# Patient Record
Sex: MALE | Race: WHITE | Employment: OTHER | URBAN - METROPOLITAN AREA
[De-identification: names, ages, dates, MRNs, and addresses within clinical notes are randomized per-mention and may not be internally consistent; named-entity substitution may affect disease eponyms.]

---

## 2014-11-18 LAB — COLONOSCOPY, EXTERNAL: NORMAL

## 2021-12-25 ENCOUNTER — APPOINTMENT (OUTPATIENT)
Dept: CT IMAGING | Age: 71
End: 2021-12-25
Attending: EMERGENCY MEDICINE
Payer: MEDICARE

## 2021-12-25 ENCOUNTER — HOSPITAL ENCOUNTER (EMERGENCY)
Age: 71
Discharge: HOME | End: 2021-12-25
Attending: EMERGENCY MEDICINE
Payer: MEDICARE

## 2021-12-25 VITALS
SYSTOLIC BLOOD PRESSURE: 134 MMHG | BODY MASS INDEX: 25.77 KG/M2 | HEART RATE: 66 BPM | HEIGHT: 70 IN | RESPIRATION RATE: 16 BRPM | WEIGHT: 180 LBS | DIASTOLIC BLOOD PRESSURE: 84 MMHG | OXYGEN SATURATION: 100 % | TEMPERATURE: 97.7 F

## 2021-12-25 DIAGNOSIS — S01.81XA FACIAL LACERATION, INITIAL ENCOUNTER: ICD-10-CM

## 2021-12-25 DIAGNOSIS — G89.29 CHRONIC LOW BACK PAIN, UNSPECIFIED BACK PAIN LATERALITY, UNSPECIFIED WHETHER SCIATICA PRESENT: ICD-10-CM

## 2021-12-25 DIAGNOSIS — S09.90XA CLOSED HEAD INJURY, INITIAL ENCOUNTER: Primary | ICD-10-CM

## 2021-12-25 DIAGNOSIS — M54.50 CHRONIC LOW BACK PAIN, UNSPECIFIED BACK PAIN LATERALITY, UNSPECIFIED WHETHER SCIATICA PRESENT: ICD-10-CM

## 2021-12-25 PROCEDURE — 90715 TDAP VACCINE 7 YRS/> IM: CPT | Performed by: EMERGENCY MEDICINE

## 2021-12-25 PROCEDURE — 75810000293 HC SIMP/SUPERF WND  RPR

## 2021-12-25 PROCEDURE — 74011250636 HC RX REV CODE- 250/636: Performed by: EMERGENCY MEDICINE

## 2021-12-25 PROCEDURE — 99282 EMERGENCY DEPT VISIT SF MDM: CPT

## 2021-12-25 PROCEDURE — 90471 IMMUNIZATION ADMIN: CPT

## 2021-12-25 PROCEDURE — 70450 CT HEAD/BRAIN W/O DYE: CPT

## 2021-12-25 RX ORDER — CYCLOBENZAPRINE HCL 10 MG
10 TABLET ORAL
Qty: 12 TABLET | Refills: 0 | Status: SHIPPED | OUTPATIENT
Start: 2021-12-25 | End: 2022-01-09

## 2021-12-25 RX ORDER — LIDOCAINE HYDROCHLORIDE AND EPINEPHRINE 20; 5 MG/ML; UG/ML
INJECTION, SOLUTION EPIDURAL; INFILTRATION; INTRACAUDAL; PERINEURAL
Status: DISCONTINUED
Start: 2021-12-25 | End: 2021-12-26 | Stop reason: HOSPADM

## 2021-12-25 RX ORDER — LIDOCAINE HYDROCHLORIDE AND EPINEPHRINE 10; 10 MG/ML; UG/ML
1.5 INJECTION, SOLUTION INFILTRATION; PERINEURAL ONCE
Status: DISCONTINUED | OUTPATIENT
Start: 2021-12-25 | End: 2021-12-26 | Stop reason: HOSPADM

## 2021-12-25 RX ADMIN — TETANUS TOXOID, REDUCED DIPHTHERIA TOXOID AND ACELLULAR PERTUSSIS VACCINE, ADSORBED 0.5 ML: 5; 2.5; 8; 8; 2.5 SUSPENSION INTRAMUSCULAR at 21:10

## 2021-12-26 NOTE — ED PROVIDER NOTES
EMERGENCY DEPARTMENT HISTORY AND PHYSICAL EXAM      Date: 12/25/2021  Patient Name: Reece Lopez    History of Presenting Illness     Chief Complaint   Patient presents with    Laceration     Patient has parkinsons and fell forward this evening and hit his head on counter top, lac to R eye       History Provided By: Patient    HPI: Reece Lopez, 73 y.o. male with PMHx as noted below presents the emergency department for evaluation of head injury. Patient states that just prior to arrival he tripped falling and hitting the right side of his head on a counter. Denies any significant headache or loss of consciousness. Patient did sustain a laceration to his face with active bleeding. Denies any visual changes or neck pain. Symptoms are mild and constant. Pt denies any other alleviating or exacerbating factors. Additionally, pt specifically denies any recent fever, chills, headache, nausea, vomiting, abdominal pain, CP, SOB, lightheadedness, dizziness, numbness, weakness, BLE swelling, heart palpitations, urinary sxs, diarrhea, constipation, melena, hematochezia, cough, or congestion. PCP: Unknown, Provider, MD    Current Facility-Administered Medications   Medication Dose Route Frequency Provider Last Rate Last Admin    lidocaine-EPINEPHrine (PF) (XYLOCAINE) 2 %-1:200,000 injection             lidocaine-EPINEPHrine (XYLOCAINE) 1 %-1:100,000 injection 15 mg  1.5 mL SubCUTAneous Gunjan Atkinson MD         Current Outpatient Medications   Medication Sig Dispense Refill    cyclobenzaprine (FLEXERIL) 10 mg tablet Take 1 Tablet by mouth three (3) times daily as needed for Muscle Spasm(s) for up to 15 days. 12 Tablet 0       Past History     Past Medical History:  History of Parkinson's disease    Past Surgical History:  History reviewed, no pertinent past surgical history  Family History:  No family history on file.     Social History:  Denies heavy alcohol or illicit drug use  Allergies:  No Known Allergies      Review of Systems   Review of Systems  Constitutional: Negative for fever, chills, and fatigue. HENT: Negative for congestion, sore throat, rhinorrhea, sneezing and neck stiffness   Eyes: Negative for discharge and redness. Respiratory: Negative for  shortness of breath, wheezing   Cardiovascular: Negative for chest pain, palpitations   Gastrointestinal: Negative for nausea, vomiting, abdominal pain, constipation, diarrhea and blood in stool. Genitourinary: Negative for dysuria, hematuria, flank pain, decreased urine volume, discharge,   Musculoskeletal: Negative for myalgias or joint pain . Skin: Negative for rash or lesions . Neurological: Negative weakness, light-headedness, numbness and headaches. Physical Exam   Physical Exam    GENERAL: alert and oriented, no acute distress  EYES: PEERL, No injection, discharge or icterus. HENT: Mucous membranes pink and moist.  There is a 1.5 cm laceration lateral to the right eye, no canthal involvement, bleeding controlled with direct pressure. Extraocular movements intact  NECK: Supple  LUNGS: Airway patent. Non-labored respirations. Breath sounds clear with good air entry bilaterally. HEART: Regular rate and rhythm. No peripheral edema  ABDOMEN: Non-distended and non-tender, without guarding or rebound. SKIN:  warm, dry  MSK/EXTREMITIES: Without swelling, tenderness or deformity, symmetric with normal ROM, no midline spinal tenderness  NEUROLOGICAL: Alert, oriented      Diagnostic Study Results     Labs -   No results found for this or any previous visit (from the past 12 hour(s)). Radiologic Studies -   CT HEAD WO CONT   Final Result   No acute intracranial process. Thalamic stimulator in place. There is a mild to moderate degree of cerebral atrophy. CT Results  (Last 48 hours)               12/25/21 2010  CT HEAD WO CONT Final result    Impression:  No acute intracranial process. Thalamic stimulator in place. There is a mild to moderate degree of cerebral atrophy. Narrative:  CLINICAL HISTORY: fall   INDICATION: fall   COMPARISON: None. CT dose reduction was achieved through use of a standardized protocol tailored   for this examination and automatic exposure control for dose modulation. TECHNIQUE: Serial axial images with a collimation of 5 mm were obtained from the   skull base through the vertex     FINDINGS:    There is sulcal and ventricular prominence. Thalamic stimulators are present. There is no evidence of an acute infarction, hemorrhage, or mass-effect. There   is no evidence of midline shift or hydrocephalus. Posterior fossa structures are   unremarkable. No extra-axial collections are seen. Mastoid air cells are well pneumatized and clear. There is no evidence of depressed skull fractures of soft tissue swelling. CXR Results  (Last 48 hours)    None            Medical Decision Making     I, Rod Jackson MD am the first provider for this patient and am the attending of record for this patient encounter. I reviewed the vital signs, available nursing notes, past medical history, past surgical history, family history and social history. Vital Signs-Reviewed the patient's vital signs. Patient Vitals for the past 12 hrs:   Temp Pulse Resp BP SpO2   12/25/21 1903 97.7 °F (36.5 °C) 66 16 134/84 100 %       Records Reviewed: Nursing Notes and Old Medical Records    Provider Notes (Medical Decision Making): On presentation, the patient is well appearing, in no acute distress with normal vital signs. Based on my history and exam the differential diagnosis for this patient includes closed head injury, laceration, lumbar strain. CT scan of the head obtained on my interpretation showed no acute findings. Laceration was lateral to the lateral canthus, no tendon or muscle involvement, facial muscles were intact.   Did  on close head injury and possible post concussive symptoms may develop. Patient does attribute fall to recently straining his lower back. He has no red flag symptoms that would warrant emergent imaging at this time. Will trial course of muscle relaxers and if referred him to orthopedics. Laceration was repaired, patient was given a tetanus immunization prior to discharge. ED Course:   Initial assessment performed. The patients presenting problems have been discussed, and they are in agreement with the care plan formulated and outlined with them. I have encouraged them to ask questions as they arise throughout their visit. Medications   lidocaine-EPINEPHrine (PF) (XYLOCAINE) 2 %-1:200,000 injection (has no administration in time range)   lidocaine-EPINEPHrine (XYLOCAINE) 1 %-1:100,000 injection 15 mg (has no administration in time range)   diph,Pertuss(AC),Tet Vac-PF (BOOSTRIX) suspension 0.5 mL (0.5 mL IntraMUSCular Given 12/25/21 2110)       Procedure Note - Laceration Repair:  Procedure by Humza Palma MD  Complexity: simple   1.5cm linear laceration to face  was irrigated copiously with NS under jet lavage, prepped with Hibiclens and draped in a sterile fashion. The area was anesthetized via local infiltration of 2 mL lidocaine 1% with epinephrine. The wound was explored with the following results: No foreign bodies found. The wound was repaired with One layer suture closure: Skin Layer:  4 sutures placed, stitch type:simple interrupted, suture: 5-0 ethilon. .  The wound was closed with good hemostasis and approximation. Sterile dressing applied. Estimated blood loss: minimal  The procedure took 1-15 minutes, and pt tolerated well. PROGRESS  Keron Henry  results have been reviewed with him. He has been counseled regarding his diagnosis. He verbally conveys understanding and agreement of the signs, symptoms, diagnosis, treatment and prognosis and additionally agrees to follow up as recommended.   He also agrees with the care-plan and conveys that all of his questions have been answered. I have also put together some discharge instructions for him that include: 1) educational information regarding their diagnosis, 2) how to care for their diagnosis at home, as well a 3) list of reasons why they would want to return to the ED prior to their follow-up appointment, should their condition change. Disposition:  home    PLAN:  1. Discharge Medication List as of 12/25/2021  9:16 PM        2. Follow-up Information     Follow up With Specialties Details Why Contact Info    [facility] EMERGENCY DEPT Emergency Medicine  If symptoms worsen [address]  [address] [email or web address]  [phone]    [facility]  Schedule an appointment as soon as possible for a visit in 2 days  [address]  [address] [facility],[address]  [address]  [email or web address]  [phone]        Return to ED if worse     Diagnosis     Clinical Impression:   1. Closed head injury, initial encounter    2. Facial laceration, initial encounter    3. Chronic low back pain, unspecified back pain laterality, unspecified whether sciatica present        Please note that this dictation was completed with Dragon, computer voice recognition software. Quite often unanticipated grammatical, syntax, homophones, and other interpretive errors are inadvertently transcribed by the computer software. Please disregard these errors. Additionally, please excuse any errors that have escaped final proofreading.

## 2022-01-12 ENCOUNTER — TRANSCRIBE ORDER (OUTPATIENT)
Dept: SCHEDULING | Age: 72
End: 2022-01-12

## 2022-01-12 DIAGNOSIS — M54.50 LUMBAR PAIN: ICD-10-CM

## 2022-01-12 DIAGNOSIS — M47.816 LUMBAR FACET ARTHROPATHY: Primary | ICD-10-CM

## 2022-01-12 DIAGNOSIS — M51.37 DDD (DEGENERATIVE DISC DISEASE), LUMBOSACRAL: ICD-10-CM

## 2022-01-23 ENCOUNTER — HOSPITAL ENCOUNTER (OUTPATIENT)
Dept: CT IMAGING | Age: 72
Discharge: HOME | End: 2022-01-23
Attending: FAMILY MEDICINE
Payer: MEDICARE

## 2022-01-23 DIAGNOSIS — M54.50 LUMBAR PAIN: ICD-10-CM

## 2022-01-23 DIAGNOSIS — M47.816 LUMBAR FACET ARTHROPATHY: ICD-10-CM

## 2022-01-23 DIAGNOSIS — M51.37 DDD (DEGENERATIVE DISC DISEASE), LUMBOSACRAL: ICD-10-CM

## 2022-01-23 PROCEDURE — 72131 CT LUMBAR SPINE W/O DYE: CPT

## 2022-01-24 ENCOUNTER — TELEPHONE (OUTPATIENT)
Dept: FAMILY MEDICINE CLINIC | Age: 72
End: 2022-01-24

## 2022-01-24 ENCOUNTER — NURSE TRIAGE (OUTPATIENT)
Dept: OTHER | Facility: CLINIC | Age: 72
End: 2022-01-24

## 2022-01-24 NOTE — TELEPHONE ENCOUNTER
Received call from Hanna Barber at [facility] with Red Flag Complaint. Subjective: Caller states \"He had a fall on francisco javier day. Today, when he was getting ready to go to the neurologist  I noticed he had swelling in his L arm more than his right arm. The neurologist stated that he thought it was cellulitis, but was unable to prescribe abx and for him to follow up with his PCP. \"     Current Symptoms: L arm swelling, redness, and warm to touch     Onset: several hours ago; rapid    Associated Symptoms: NA    Pain Severity: Denies  Temperature:Denies  What has been tried: tylenol     LMP: NA Pregnant: NA    Recommended disposition: See in office within 24 hours. Care advice provided, patient verbalizes understanding; denies any other questions or concerns; instructed to call back for any new or worsening symptoms. Writer provided warm transfer to Geannie Ahumada at [facility] for appointment scheduling    Attention Provider: Thank you for allowing me to participate in the care of your patient. The patient was connected to triage in response to information provided to the [facility]. Please do not respond through this encounter as the response is not directed to a shared pool.       Reason for Disposition   MODERATE arm swelling (e.g., puffiness or swollen feeling of entire arm)    Protocols used: ARM SWELLING AND EDEMA-ADULT-

## 2022-01-24 NOTE — TELEPHONE ENCOUNTER
[facility] trying to get patient in via nurse triage. I told agent he is a new patient pending an appt in February, so we do not have anything earlier for pt until then.  Please call wife for triage at [phone]

## 2022-01-25 ENCOUNTER — HOSPITAL ENCOUNTER (EMERGENCY)
Age: 72
Discharge: HOME | End: 2022-01-25
Attending: EMERGENCY MEDICINE
Payer: MEDICARE

## 2022-01-25 VITALS
HEART RATE: 61 BPM | SYSTOLIC BLOOD PRESSURE: 96 MMHG | TEMPERATURE: 97.4 F | WEIGHT: 180 LBS | RESPIRATION RATE: 22 BRPM | BODY MASS INDEX: 25.83 KG/M2 | DIASTOLIC BLOOD PRESSURE: 74 MMHG | OXYGEN SATURATION: 100 %

## 2022-01-25 DIAGNOSIS — L03.114 CELLULITIS OF LEFT UPPER EXTREMITY: Primary | ICD-10-CM

## 2022-01-25 LAB
ALBUMIN SERPL-MCNC: 3.4 G/DL (ref 3.5–5)
ALBUMIN/GLOB SERPL: 0.9 {RATIO} (ref 1.1–2.2)
ALP SERPL-CCNC: 48 U/L (ref 45–117)
ALT SERPL-CCNC: 10 U/L (ref 12–78)
ANION GAP SERPL CALC-SCNC: 6 MMOL/L (ref 5–15)
AST SERPL-CCNC: 65 U/L (ref 15–37)
BASOPHILS # BLD: 0 K/UL (ref 0–0.1)
BASOPHILS NFR BLD: 0 % (ref 0–1)
BILIRUB SERPL-MCNC: 1 MG/DL (ref 0.2–1)
BUN SERPL-MCNC: 16 MG/DL (ref 6–20)
BUN/CREAT SERPL: 17 (ref 12–20)
CALCIUM SERPL-MCNC: 8.9 MG/DL (ref 8.5–10.1)
CHLORIDE SERPL-SCNC: 92 MMOL/L (ref 97–108)
CO2 SERPL-SCNC: 26 MMOL/L (ref 21–32)
CREAT SERPL-MCNC: 0.93 MG/DL (ref 0.7–1.3)
DIFFERENTIAL METHOD BLD: ABNORMAL
EOSINOPHIL # BLD: 0.1 K/UL (ref 0–0.4)
EOSINOPHIL NFR BLD: 1 % (ref 0–7)
ERYTHROCYTE [DISTWIDTH] IN BLOOD BY AUTOMATED COUNT: 13.6 % (ref 11.5–14.5)
GLOBULIN SER CALC-MCNC: 3.8 G/DL (ref 2–4)
GLUCOSE SERPL-MCNC: 87 MG/DL (ref 65–100)
HCT VFR BLD AUTO: 27.5 % (ref 36.6–50.3)
HGB BLD-MCNC: 9.6 G/DL (ref 12.1–17)
IMM GRANULOCYTES # BLD AUTO: 0 K/UL (ref 0–0.04)
IMM GRANULOCYTES NFR BLD AUTO: 0 % (ref 0–0.5)
LYMPHOCYTES # BLD: 0.5 K/UL (ref 0.8–3.5)
LYMPHOCYTES NFR BLD: 5 % (ref 12–49)
MCH RBC QN AUTO: 33.7 PG (ref 26–34)
MCHC RBC AUTO-ENTMCNC: 34.9 G/DL (ref 30–36.5)
MCV RBC AUTO: 96.5 FL (ref 80–99)
MONOCYTES # BLD: 0.6 K/UL (ref 0–1)
MONOCYTES NFR BLD: 6 % (ref 5–13)
NEUTS SEG # BLD: 9.5 K/UL (ref 1.8–8)
NEUTS SEG NFR BLD: 88 % (ref 32–75)
NRBC # BLD: 0 K/UL (ref 0–0.01)
NRBC BLD-RTO: 0 PER 100 WBC
PLATELET # BLD AUTO: 314 K/UL (ref 150–400)
PMV BLD AUTO: 9.3 FL (ref 8.9–12.9)
POTASSIUM SERPL-SCNC: 3.5 MMOL/L (ref 3.5–5.1)
PROT SERPL-MCNC: 7.2 G/DL (ref 6.4–8.2)
RBC # BLD AUTO: 2.85 M/UL (ref 4.1–5.7)
RBC MORPH BLD: ABNORMAL
SODIUM SERPL-SCNC: 124 MMOL/L (ref 136–145)
WBC # BLD AUTO: 10.7 K/UL (ref 4.1–11.1)

## 2022-01-25 PROCEDURE — 85025 COMPLETE CBC W/AUTO DIFF WBC: CPT

## 2022-01-25 PROCEDURE — 99282 EMERGENCY DEPT VISIT SF MDM: CPT

## 2022-01-25 PROCEDURE — 80053 COMPREHEN METABOLIC PANEL: CPT

## 2022-01-25 PROCEDURE — 36415 COLL VENOUS BLD VENIPUNCTURE: CPT

## 2022-01-25 RX ORDER — DOXYCYCLINE HYCLATE 100 MG
100 TABLET ORAL 2 TIMES DAILY
Qty: 20 TABLET | Refills: 0 | Status: SHIPPED | OUTPATIENT
Start: 2022-01-25 | End: 2022-02-04

## 2022-01-25 NOTE — DISCHARGE INSTRUCTIONS
It was a pleasure taking care of you in our Emergency Department today. We know that when you come to [facility], you are entrusting us with your health, comfort, and safety. Our physicians and nurses honor that trust, and truly appreciate the opportunity to care for you and your loved ones. We also value your feedback. If you receive a survey about your Emergency Department experience today, please fill it out. We care about our patients' feedback, and we listen to what you have to say. Please read over your discharge instructions as these contain pertinent information to help you in the healing process. These instructions include a list of prescriptions you were given today. Follow-up information is also noted on your discharge papers. There are attached instructions and information pertaining to the reason why you were seen in the emergency department today. These discharge instructions may not be for exactly why you were here, but may be the closest available instructions that we have. These include important advice for things that you can do at home to feel better, and reasons to return to the emergency department. The evaluation and treatment you received in the emergency department is not always definitive care. If follow-up with your primary care doctor or specialist was recommended, it is important that you make these appointments for follow-up care. You may need further testing, procedures, and/or medications to help you feel better. Further tests may be required that are not available in the emergency department. Failure to make these follow-up appointments may jeopardize your health. The emergency department is here for emergent stabilization and evaluation of life and limb threatening illness and/or injuries.   Further care through a specialist or primary care doctor may be required to assist in your healing and complete your treatment and/or evaluation. We may not always be able to make a diagnosis in the emergency department, or things may change that will alter your diagnosis. Our primary goal is to ensure that nothing serious is occurring and that you are stable to continue your treatment and evaluation at home as an outpatient. Of course, if things change, and you feel worse, you are always encouraged to return to the emergency department for re-evaluation. Lab Results Today:  Recent Results (from the past 8 hour(s))   CBC WITH AUTOMATED DIFF    Collection Time: 01/25/22 10:06 AM   Result Value Ref Range    WBC 10.7 4.1 - 11.1 K/uL    RBC 2.85 (L) 4.10 - 5.70 M/uL    HGB 9.6 (L) 12.1 - 17.0 g/dL    HCT 27.5 (L) 36.6 - 50.3 %    MCV 96.5 80.0 - 99.0 FL    MCH 33.7 26.0 - 34.0 PG    MCHC 34.9 30.0 - 36.5 g/dL    RDW 13.6 11.5 - 14.5 %    PLATELET 072 325 - 134 K/uL    MPV 9.3 8.9 - 12.9 FL    NRBC 0.0 0  WBC    ABSOLUTE NRBC 0.00 0.00 - 0.01 K/uL    NEUTROPHILS 88 (H) 32 - 75 %    LYMPHOCYTES 5 (L) 12 - 49 %    MONOCYTES 6 5 - 13 %    EOSINOPHILS 1 0 - 7 %    BASOPHILS 0 0 - 1 %    IMMATURE GRANULOCYTES 0 0.0 - 0.5 %    ABS. NEUTROPHILS 9.5 (H) 1.8 - 8.0 K/UL    ABS. LYMPHOCYTES 0.5 (L) 0.8 - 3.5 K/UL    ABS. MONOCYTES 0.6 0.0 - 1.0 K/UL    ABS. EOSINOPHILS 0.1 0.0 - 0.4 K/UL    ABS. BASOPHILS 0.0 0.0 - 0.1 K/UL    ABS. IMM. GRANS. 0.0 0.00 - 0.04 K/UL    DF SMEAR SCANNED      RBC COMMENTS NORMOCYTIC, NORMOCHROMIC          Radiology Results Today:  No results found.

## 2022-01-25 NOTE — ED PROVIDER NOTES
EMERGENCY DEPARTMENT HISTORY AND PHYSICAL EXAM      Date: 1/25/2022  Patient Name: Pamela Nichols    History of Presenting Illness     Chief Complaint   Patient presents with    Swelling     Left elbow swelling and redness for at least 2 days. Hx of Parkinsons with nerve stimulator, chronic back pain on tylenol/motrin       History Provided By: Patient    HPI: Pamela Nichols, 73 y.o. male  presents to the ED with cc of left elbow swelling and redness. Patient states symptoms of been present for least 2 days. He normally wears long sleeves and may have been present for longer just not noticed by his family member. He denies any fevers or chills. He has a history of Parkinson's. He does have a scab on the left elbow. Family reports that he leans on this arm a lot especially when he is on the sofa. He has been taking Tylenol and Motrin for discomfort. No procedures on the left upper extremity recently. No IVs.  No history of DVT. Past History     Past Medical History:  No past medical history on file. Past Surgical History:  No past surgical history on file. Medications:  No current facility-administered medications on file prior to encounter. No current outpatient medications on file prior to encounter. Family History:  No family history on file. Social History:  Social History     Tobacco Use    Smoking status: Not on file    Smokeless tobacco: Not on file   Substance Use Topics    Alcohol use: Not on file    Drug use: Not on file       Allergies:  No Known Allergies    All the above components of the past  history are auto-populated from the electronic record. They have been reviewed and the patient has been interviewed for any pertinent past history that pertains to the patient's chief complaint and reason for visit. Not all pre-populated components may be accurate at the time this note was generated.     Review of Systems   Review of Systems   Constitutional: Negative for chills and fever. HENT: Negative for congestion, ear pain, rhinorrhea, sore throat and trouble swallowing. Eyes: Negative for visual disturbance. Respiratory: Negative for cough, chest tightness and shortness of breath. Cardiovascular: Negative for chest pain and palpitations. Gastrointestinal: Negative for abdominal pain, blood in stool, constipation, diarrhea, nausea and vomiting. Genitourinary: Negative for decreased urine volume, difficulty urinating, dysuria and frequency. Musculoskeletal: Positive for arthralgias. Negative for back pain and neck pain. Skin: Positive for wound. Negative for color change and rash. Neurological: Negative for dizziness, weakness, light-headedness and headaches. Physical Exam   Physical Exam  Vitals and nursing note reviewed. Constitutional:       General: He is not in acute distress. Appearance: He is well-developed. He is not ill-appearing. HENT:      Head: Normocephalic. Eyes:      Conjunctiva/sclera: Conjunctivae normal.   Cardiovascular:      Rate and Rhythm: Normal rate and regular rhythm. Pulmonary:      Effort: Pulmonary effort is normal. No accessory muscle usage or respiratory distress. Abdominal:      General: There is no distension. Musculoskeletal:      Left elbow: Swelling present. No deformity or effusion. Normal range of motion. Tenderness present. Cervical back: Normal range of motion. Comments: Left upper extremity does note erythema, warmth, and tenderness as well as swelling around the left elbow. There is no joint effusion. He has full nonpainful range of motion in the elbow. I do not suspect septic joint or bursitis. The erythema and swelling extends beyond the bursa. He does have a scab to the olecranon process. Skin:     General: Skin is warm and dry. Neurological:      Mental Status: He is alert and oriented to person, place, and time.          Due to the COVID-19 pandemic, in order to reduce the spread and transmission of the virus, some basic elements of the physical exam have been deferred to reduce direct or close contact with the patient unless they are deemed to be absolutely necessary, regardless of whether the virus is highly suspected or not. Diagnostic Study Results     Labs -     Recent Results (from the past 24 hour(s))   CBC WITH AUTOMATED DIFF    Collection Time: 01/25/22 10:06 AM   Result Value Ref Range    WBC 10.7 4.1 - 11.1 K/uL    RBC 2.85 (L) 4.10 - 5.70 M/uL    HGB 9.6 (L) 12.1 - 17.0 g/dL    HCT 27.5 (L) 36.6 - 50.3 %    MCV 96.5 80.0 - 99.0 FL    MCH 33.7 26.0 - 34.0 PG    MCHC 34.9 30.0 - 36.5 g/dL    RDW 13.6 11.5 - 14.5 %    PLATELET 301 661 - 432 K/uL    MPV 9.3 8.9 - 12.9 FL    NRBC 0.0 0  WBC    ABSOLUTE NRBC 0.00 0.00 - 0.01 K/uL    NEUTROPHILS 88 (H) 32 - 75 %    LYMPHOCYTES 5 (L) 12 - 49 %    MONOCYTES 6 5 - 13 %    EOSINOPHILS 1 0 - 7 %    BASOPHILS 0 0 - 1 %    IMMATURE GRANULOCYTES 0 0.0 - 0.5 %    ABS. NEUTROPHILS 9.5 (H) 1.8 - 8.0 K/UL    ABS. LYMPHOCYTES 0.5 (L) 0.8 - 3.5 K/UL    ABS. MONOCYTES 0.6 0.0 - 1.0 K/UL    ABS. EOSINOPHILS 0.1 0.0 - 0.4 K/UL    ABS. BASOPHILS 0.0 0.0 - 0.1 K/UL    ABS. IMM. GRANS. 0.0 0.00 - 0.04 K/UL    DF SMEAR SCANNED      RBC COMMENTS NORMOCYTIC, NORMOCHROMIC         Radiologic Studies -   No orders to display     CT Results  (Last 48 hours)    None        CXR Results  (Last 48 hours)    None            Medical Decision Making     I reviewed the vital signs, available nursing notes, past medical history, past surgical history, family history and social history. Vital Signs-I have reviewed the vital signs that have been made available during the patient's emergency department visit. The vital signs auto-populated below are obtained mostly by electronic means through monitoring devices that have been downloaded into the patient's chart by the nursing staff.   Some vital signs are not downloaded into the chart until after the patient has been discharged and this note has been completed, therefore some vital signs may not be available to the physician for review prior to patient's discharge or admission. The physician has reviewed the patient's triage vital signs, monitored the electronic monitoring devices remotely for any significant vital sign abnormalities, and have reviewed vital signs prior to discharge. Some vital signs reviewed at bedside or remotely utilizing electronic monitoring devices may be different than the vital signs downloaded into the electronic medical record. Some vital signs may be erroneous and inaccurate since they are obtained by electronic monitoring devices, and not all vital signs are verified for accuracy by nursing staff prior to downloading into the patient's chart. Patient Vitals for the past 24 hrs:   Temp Pulse Resp BP SpO2   01/25/22 0957 97.4 °F (36.3 °C) 61 22 96/74 100 %         Records Reviewed: Nursing notes for today's visit have been reviewed. I have also reviewed most recent medical records pertinent to today's complaints, if available in our medical record system. I have also reviewed all labs and imaging results from previous results in comparison to results obtained today. If an EKG was obtained today, it has been compared to previous EKGs, if available. If arriving via EMS, the EMS report has been reviewed if made available to us within the patient's time in the emergency department. Provider Notes (Medical Decision Making):   Patient presents with signs of cellulitis to the left elbow area. I do not suspect bursitis and that the symptoms extend beyond the bursa. He does have a scab to the olecranon process which was likely the site of inoculation. No signs of sepsis. Do not suspect septic joint. I do not suspect DVT as an upper extremity DVT is highly rare without any recent procedures or IVs.  We will start him on doxycycline.   Recommend follow-up with PCP and he is an established patient with [address] [place] [address] who can also provide follow-up. ED Course:   Initial assessment performed. The patients presenting problems have been discussed, and they are in agreement with the care plan formulated and outlined with them. I have encouraged them to ask questions as they arise throughout their visit. Orders Placed This Encounter    CBC WITH AUTOMATED DIFF    METABOLIC PANEL, COMPREHENSIVE    INSERT PERIPHERAL IV ONE TIME STAT    doxycycline (VIBRA-TABS) 100 mg tablet       Procedures      Critical Care Time:   0    Disposition:  Discharge    The patient's emergency department evaluation is now complete. I have reviewed all labs, imaging, and pertinent information. I have discussed all results with the patient and/or family. Based on our evaluation today I do believe that the patient is safe to be discharged home. The patient has been provided with at home instructions that are pertinent to their complaint today, although these may not be specific to the exact diagnosis. I have reviewed the patient's home medications and attempted to reconcile if not already done so by pharmacy or nursing staff. I have discussed all new prescriptions with the patient. The patient has been encouraged to follow-up with primary care doctor and/or specialist, and these have been discussed with the patient. The patient has been advised that they may return to the emergency department if they have any worsening symptoms and or new symptoms that are of concern to them. Verbal discharge instructions may have also been provided to the patient that may not be specifically contained in the written discharge instructions. The patient has been given opportunity to ask questions prior to discharge. PLAN:  1. Current Discharge Medication List      START taking these medications    Details   doxycycline (VIBRA-TABS) 100 mg tablet Take 1 Tablet by mouth two (2) times a day for 10 days.   Qty: 20 Tablet, Refills: 0  Start date: 1/25/2022, End date: 2/4/2022           2. Follow-up Information     Follow up With Specialties Details Why Contact Info    Cindy Marin MD Family Medicine Schedule an appointment as soon as possible for a visit in 1 week  [address]      Nu King MD Sports Medicine Schedule an appointment as soon as possible for a visit in 1 week  [address] [facility] [address]  [address] [id number] [phone]          Return to ED if worse     Diagnosis     Clinical Impression:   1.  Cellulitis of left upper extremity

## 2022-01-31 RX ORDER — RAMELTEON 8 MG/1
1 TABLET ORAL AT BEDTIME
COMMUNITY
Start: 2021-08-18

## 2022-01-31 RX ORDER — CHOLECALCIFEROL TAB 125 MCG (5000 UNIT) 125 MCG
5000 TAB ORAL DAILY
COMMUNITY

## 2022-01-31 RX ORDER — MULTIVIT WITH MINERALS/HERBS
1 TABLET ORAL DAILY
COMMUNITY

## 2022-01-31 RX ORDER — IBUPROFEN 200 MG
600 TABLET ORAL
COMMUNITY
Start: 2021-09-28 | End: 2022-04-20

## 2022-01-31 RX ORDER — CARBIDOPA AND LEVODOPA 50; 200 MG/1; MG/1
2 TABLET, EXTENDED RELEASE ORAL AT BEDTIME
COMMUNITY
Start: 2021-07-09

## 2022-01-31 RX ORDER — LEVODOPA 42 MG/1
84 CAPSULE RESPIRATORY (INHALATION) AS NEEDED
COMMUNITY
Start: 2021-09-09

## 2022-01-31 RX ORDER — LEVOTHYROXINE SODIUM 75 UG/1
75 TABLET ORAL
COMMUNITY
End: 2022-09-13

## 2022-01-31 RX ORDER — MULTIVIT WITH IRON,MINERALS
3 TABLET,CHEWABLE ORAL DAILY
COMMUNITY
Start: 2021-09-09

## 2022-01-31 RX ORDER — MELATONIN/PYRIDOXINE HCL (B6) 10 MG-10MG
1 TABLET,IMMED, EXTENDED RELEASE, BIPHASIC ORAL
COMMUNITY
Start: 2021-09-17

## 2022-01-31 RX ORDER — CARBIDOPA, LEVODOPA AND ENTACAPONE 31.25; 200; 125 MG/1; MG/1; MG/1
TABLET, FILM COATED ORAL
COMMUNITY
Start: 2021-03-22

## 2022-01-31 RX ORDER — ACETAMINOPHEN 500 MG
1000 TABLET ORAL 3 TIMES DAILY
COMMUNITY

## 2022-01-31 RX ORDER — POLYETHYLENE GLYCOL 3350 17 G/17G
17 POWDER, FOR SOLUTION ORAL DAILY PRN
COMMUNITY
Start: 2021-09-09

## 2022-01-31 RX ORDER — CARBIDOPA AND LEVODOPA 25; 100 MG/1; MG/1
3 TABLET ORAL DAILY
COMMUNITY
Start: 2022-01-24

## 2022-01-31 RX ORDER — PAROXETINE 30 MG/1
1 TABLET, FILM COATED ORAL AT BEDTIME
COMMUNITY
Start: 2021-09-17

## 2022-01-31 RX ORDER — MIRTAZAPINE 45 MG/1
1 TABLET, FILM COATED ORAL AT BEDTIME
COMMUNITY
Start: 2021-09-17

## 2022-01-31 NOTE — PERIOP NOTES
[facility]  Ambulatory Surgery Unit  Pre-operative Instructions    Procedure Date  2/8            Tentative Arrival Time 09:00      1. On the day of your procedure, please report to the Ambulatory Surgery Unit Registration Desk and sign in at your designated time. The Ambulatory Surgery Unit is located in [facility] on the [facility] side of the [facility] across from the [address] [place] [address]. Please have all of your health insurance cards and a photo ID. **Due to current COVID restrictions, only ONE adult may accompany you the day of the procedure. We have limited seating available. If our waiting room is at capacity, your ride may be asked to remain in their vehicle. No children are allowed in the waiting room. 2. You must have someone with you to drive you home as directed by your surgeon. 3. You may have a light breakfast and take normal morning medications. 4. We recommend you do not drink any alcoholic beverages for 24 hours before and after your procedure. 5. Contact your surgeons office for instructions on the following medications: non-steroidal anti-inflammatory drugs (i.e. Advil, Aleve), vitamins, and supplements. (Some surgeons will want you to stop these medications prior to surgery and others may allow you to take them)   **If you are currently taking Plavix, Coumadin, Aspirin and/or other blood-thinning agents, contact your surgeon for instructions. ** Your surgeon will partner with the physician prescribing these medications to determine if it is safe to stop or if you need to continue taking. Please do not stop taking these medications without instructions from your surgeon. 6. In an effort to help prevent surgical site infection, we ask that you shower with an anti-bacterial soap (i.e. Dial or Safeguard) on the morning of your procedure. Do not apply any lotions, powders, or deodorants after showering. 7. Wear comfortable clothes.  Wear glasses instead of contacts. Do not bring any jewelry or money (other than copays or fees as instructed). Do not wear make-up, particularly mascara, the morning of your procedure. Wear your hair loose or down, no ponytails, buns, royce pins or clips. All body piercings must be removed. 8. You should understand that if you do not follow these instructions your procedure may be cancelled. If your physical condition changes (i.e. fever, cold or flu) please contact your surgeon as soon as possible. 9. It is important that you be on time. If a situation occurs where you may be late, or if you have any questions or problems, please call [phone].    10. Your procedure time may be subject to change. You will receive a phone call the day prior to confirm your arrival time. I understand a pre-operative phone call will be made to verify my procedure time. In the event that I am not available, I give permission for a message to be left on my answering service and/or with another person?       yes      Reviewed by phone with wife per pt request.  Verbalized understanding.   ___________________      ___________________      ___________________  (Signature of Patient)          (Witness)                   (Date and Time)

## 2022-01-31 NOTE — PERIOP NOTES
PAT call completed with pt and wife per pt request.  Wife reports case will be postponed as pt is currently on abx. Karely Gómez in Dr. Roxy Carrion office to reschedule.

## 2022-02-11 RX ORDER — DICLOFENAC SODIUM 75 MG/1
75 TABLET, DELAYED RELEASE ORAL 2 TIMES DAILY
COMMUNITY

## 2022-02-11 NOTE — PERIOP NOTES
PAT call to patient and wife. Notified of procedure date 2/22 arrival 3:15pm.  Wife aware that preop instructions are loaded in chart, she has contact number for asu for any questions.

## 2022-02-16 ENCOUNTER — OFFICE VISIT (OUTPATIENT)
Dept: FAMILY MEDICINE CLINIC | Age: 72
End: 2022-02-16
Payer: MEDICARE

## 2022-02-16 VITALS
TEMPERATURE: 98.2 F | SYSTOLIC BLOOD PRESSURE: 92 MMHG | BODY MASS INDEX: 25.05 KG/M2 | DIASTOLIC BLOOD PRESSURE: 51 MMHG | HEART RATE: 74 BPM | HEIGHT: 70 IN | RESPIRATION RATE: 16 BRPM | WEIGHT: 175 LBS | OXYGEN SATURATION: 96 %

## 2022-02-16 DIAGNOSIS — M54.30 CHRONIC SCIATICA, UNSPECIFIED LATERALITY: ICD-10-CM

## 2022-02-16 DIAGNOSIS — F32.A DEPRESSION, UNSPECIFIED DEPRESSION TYPE: ICD-10-CM

## 2022-02-16 DIAGNOSIS — G20 PARKINSON'S DISEASE (HCC): Primary | ICD-10-CM

## 2022-02-16 DIAGNOSIS — C73 THYROID CANCER (HCC): ICD-10-CM

## 2022-02-16 PROCEDURE — G8419 CALC BMI OUT NRM PARAM NOF/U: HCPCS | Performed by: FAMILY MEDICINE

## 2022-02-16 PROCEDURE — G0463 HOSPITAL OUTPT CLINIC VISIT: HCPCS | Performed by: FAMILY MEDICINE

## 2022-02-16 PROCEDURE — G8510 SCR DEP NEG, NO PLAN REQD: HCPCS | Performed by: FAMILY MEDICINE

## 2022-02-16 PROCEDURE — 1101F PT FALLS ASSESS-DOCD LE1/YR: CPT | Performed by: FAMILY MEDICINE

## 2022-02-16 PROCEDURE — 99203 OFFICE O/P NEW LOW 30 MIN: CPT | Performed by: FAMILY MEDICINE

## 2022-02-16 PROCEDURE — G8536 NO DOC ELDER MAL SCRN: HCPCS | Performed by: FAMILY MEDICINE

## 2022-02-16 PROCEDURE — 3017F COLORECTAL CA SCREEN DOC REV: CPT | Performed by: FAMILY MEDICINE

## 2022-02-16 PROCEDURE — G8427 DOCREV CUR MEDS BY ELIG CLIN: HCPCS | Performed by: FAMILY MEDICINE

## 2022-02-16 NOTE — PROGRESS NOTES
1. Have you been to the ER, urgent care clinic since your last visit? Hospitalized since your last visit? Yes, [facility], on file. 2. Have you seen or consulted any other health care providers outside of the [address] [place] [address] since your last visit? Include any pap smears or colon screening. Yes, Neurology, Dr. Abbey Conde and Endocrinology. Pt also sees a Psychiatrist [facility].      Health Maintenance Due   Topic Date Due    Hepatitis C Screening  Never done    Depression Screen  Never done    Lipid Screen  Never done    Shingrix Vaccine Age 50> (1 of 2) Never done    Pneumococcal 65+ years (1 of 1 - PPSV23) Never done    Medicare Yearly Exam  Never done     Chief Complaint   Patient presents with   [address]

## 2022-02-16 NOTE — PROGRESS NOTES
Chief Complaint   Patient presents with   [address]     Pt is seen today to establish care. Pt has Parkinson's disease, thyroid disease, sciatic issues. Pt is followed by [facility] for parkinson's, also sees a psychiatrist.     Pt is working with Dr. Darline Giron for back pain. Pt is scheduled for an injection next week. Pt overdosed on ativan in September, was in [address] [place] [address] psych unit. Pt was going struggling through severe agitation and restlessness related to thyroid treatment. Pt was treated for thyroid cancer in 2019. Pt was kept on high synthroid doses to suppress cancer risk. Thyroid dose has been titrated based on symptoms. Followed by [address] [place] [address], Dr. Chapito Huffman: (As above and below)     Chief Complaint   Patient presents with   [address]     he is a 73y.o. year old male who presents for evaluation. Reviewed PmHx, RxHx, FmHx, SocHx, AllgHx and updated in chart. Review of Systems - negative except as listed above    Objective:     Vitals:    02/16/22 0930 02/16/22 0940   BP: (!) 92/54 (!) 92/51   Pulse: 74    Resp: 16    Temp: 98.2 °F (36.8 °C)    TempSrc: Oral    SpO2: 96%    Weight: 175 lb (79.4 kg)    Height: 5' 10\" (1.778 m)      Physical Examination: General appearance - alert, well appearing, and in no distress  Mental status - normal mood, behavior, speech, dress, motor activity, and thought processes  Chest - clear to auscultation, no wheezes, rales or rhonchi, symmetric air entry  Heart - normal rate, regular rhythm, normal S1, S2, no murmurs, rubs, clicks or gallops  Musculoskeletal - walks with a walker  Extremities - peripheral pulses normal, no pedal edema, no clubbing or cyanosis    Assessment/ Plan:   1. Parkinson's disease ([address]  -followed by neuro at [address] [place] [address]    2. Thyroid cancer ([facility] [address]  -followed by endo at [facility], synthroid dose is currently in flux    3. Chronic sciatica, unspecified laterality  -scheduled for an injection    4.  Depression, unspecified depression type  - followed by psych       I have discussed the diagnosis with the patient and the intended plan as seen in the above orders. The patient has received an after-visit summary and questions were answered concerning future plans.      Medication Side Effects and Warnings were discussed with patient: yes  Patient Labs were reviewed: yes  Patient Past Records were reviewed:  yes    Gabriel Somers M.D.

## 2022-02-22 ENCOUNTER — HOSPITAL ENCOUNTER (OUTPATIENT)
Age: 72
Setting detail: OUTPATIENT SURGERY
Discharge: HOME | End: 2022-02-22
Attending: PHYSICAL MEDICINE & REHABILITATION | Admitting: PHYSICAL MEDICINE & REHABILITATION
Payer: MEDICARE

## 2022-02-22 ENCOUNTER — APPOINTMENT (OUTPATIENT)
Dept: GENERAL RADIOLOGY | Age: 72
End: 2022-02-22
Attending: PHYSICAL MEDICINE & REHABILITATION
Payer: MEDICARE

## 2022-02-22 VITALS
BODY MASS INDEX: 25.77 KG/M2 | RESPIRATION RATE: 14 BRPM | TEMPERATURE: 97.8 F | HEIGHT: 70 IN | OXYGEN SATURATION: 98 % | HEART RATE: 67 BPM | DIASTOLIC BLOOD PRESSURE: 85 MMHG | WEIGHT: 180 LBS | SYSTOLIC BLOOD PRESSURE: 144 MMHG

## 2022-02-22 PROCEDURE — 77030003665 HC NDL SPN BBMI -A: Performed by: PHYSICAL MEDICINE & REHABILITATION

## 2022-02-22 PROCEDURE — 2709999900 HC NON-CHARGEABLE SUPPLY: Performed by: PHYSICAL MEDICINE & REHABILITATION

## 2022-02-22 PROCEDURE — 72020 X-RAY EXAM OF SPINE 1 VIEW: CPT

## 2022-02-22 PROCEDURE — 74011250636 HC RX REV CODE- 250/636: Performed by: PHYSICAL MEDICINE & REHABILITATION

## 2022-02-22 PROCEDURE — 76030000002 HC AMB SURG OR TIME FIRST 0.: Performed by: PHYSICAL MEDICINE & REHABILITATION

## 2022-02-22 PROCEDURE — 76210000046 HC AMBSU PH II REC FIRST 0.5 HR: Performed by: PHYSICAL MEDICINE & REHABILITATION

## 2022-02-22 PROCEDURE — 76000 FLUOROSCOPY <1 HR PHYS/QHP: CPT

## 2022-02-22 PROCEDURE — 74011000250 HC RX REV CODE- 250: Performed by: PHYSICAL MEDICINE & REHABILITATION

## 2022-02-22 RX ORDER — LIDOCAINE HYDROCHLORIDE 20 MG/ML
10 INJECTION, SOLUTION INFILTRATION; PERINEURAL ONCE
Status: COMPLETED | OUTPATIENT
Start: 2022-02-22 | End: 2022-02-22

## 2022-02-22 RX ORDER — BUPIVACAINE HYDROCHLORIDE 5 MG/ML
10 INJECTION, SOLUTION EPIDURAL; INTRACAUDAL ONCE
Status: COMPLETED | OUTPATIENT
Start: 2022-02-22 | End: 2022-02-22

## 2022-02-22 RX ORDER — DEXAMETHASONE SODIUM PHOSPHATE 4 MG/ML
6 INJECTION, SOLUTION INTRA-ARTICULAR; INTRALESIONAL; INTRAMUSCULAR; INTRAVENOUS; SOFT TISSUE ONCE
Status: COMPLETED | OUTPATIENT
Start: 2022-02-22 | End: 2022-02-22

## 2022-02-22 NOTE — H&P
Procedural Case Note    2/22/2022    (3:25 PM)    Lexi Pope    1950   (73 y.o.)    [id number]    CC:  pain    ROS:   Complete ROS obtained, no CP, no SOB, no N or V    PMH:     Past Medical History:   Diagnosis Date    Arthritis     back and left knee    Gout     [facility]'s disease ([facility] [address]     S/P deep brain stimulator placement     Thyroid cancer ([facility] [address]     radioactive iodine 4/2020       ALLERGIES:     Allergies   Allergen Reactions    Celebrex [Celecoxib] Other (comments)     Mental status changes    Quetiapine Unknown (comments)     Difficult breathing        Trazodone Unknown (comments)     Difficult breathing       MEDS:     Current Facility-Administered Medications   Medication Dose Route Frequency    bupivacaine (PF) (MARCAINE) 0.5 % (5 mg/mL) injection 50 mg  10 mL Intra artICUlar ONCE    lidocaine (XYLOCAINE) 20 mg/mL (2 %) injection 200 mg  10 mL Other ONCE    dexamethasone (DECADRON) 4 mg/mL injection 6 mg  6 mg Intra artICUlar ONCE    iohexoL (OMNIPAQUE) 180 mg iodine/mL injection 10 mL  10 mL Intra artICUlar ONCE          Visit Vitals  BP (!) 147/91 (BP 1 Location: Right arm, BP Patient Position: At rest)   Pulse 70   Temp 97.9 °F (36.6 °C)   Resp 16   Ht 5' 10\" (1.778 m)   Wt 81.6 kg (180 lb)   SpO2 100%   BMI 25.83 kg/m²     PE:  Gen: NAD  Head: normocephalic  Heart: RRR  Lungs: CTA luigi  Abd: NT, ND, soft  Neuro: awake and alert  Skin: intact    IMPRESSION:   Lumbar spondylosis    Note:  The clinical status was discussed in detail with the patient. The procedure was again discussed and described in detail. All understand and accept the planned procedure and risks; reject other forms of treatment. All questions are answered.     Mari Cerda MD

## 2022-02-22 NOTE — OP NOTES
Facet Medial Branch Block Injection Operative Report    Indications: This is a 73 y.o. male who presents with LBP. He was positive for LS DJD. The patient was admitted for diagnostic procedure as conservative measures have failed. Date of Surgery: 2/22/2022    Preoperative Diagnosis: Lumbar Spondylosis    Postoperative Diagnosis: Lumbar Spondylosis    Surgeon(s) and Role:     Imani Chase MD - Primary     Procedure:  Procedure(s):  BILATERAL L4-5 and L5-S1 MEDIAL BRANCH BLOCK    Procedure in Detail:  After appropriate informed consent was obtained, the patient was taken to the operating suite and placed in the prone position on the operating table on appropriate padding. The LS region was prepped and draped in the usual sterile fashion. Intraoperative fluoroscopy was used to localize the LS spine. The skin was infiltrated with 2% lidocaine. A 25-g needle was advanced into the Prakash L4-5 and L5-S1 Medial Branches under fluoroscopic guidance. Contrast was injected to confirm needle placement location. Permanent fluoroscopic images were saved in the patient's record. Next, 4ml of 0.5% marcaine and 10mg of Dexamethasone were injected divided equally between locations. The needle was removed from the patient. The patient was then turned back into the supine position on the stretcher and was taken to the Recovery Room in stable condition.     Estimated Blood Loss:  none     Specimens: None       Drains: None          Complications:  None    Signed By: Nina Siddiqui MD                        February 22, 2022

## 2022-02-22 NOTE — PALLIATIVE CARE DISCHARGE
Tarah Colonyers  1950  [id number]    Situation:  Verbal report given from: MAYKEL Hernandes RN  Procedure: Procedure(s):  BILATERAL L4-5 MEDIAL BRANCH BLOCK    Background:    Preoperative diagnosis: Lumbar facet arthropathy [M47.816]  Degeneration, intervertebral disc, lumbosacral [M51.37]  Other form of scoliosis of thoracolumbar spine [M41.85]    Postoperative diagnosis: Lumbar facet arthropathy [M47.816]  Degeneration, intervertebral disc, lumbosacral [M51.37]  Other form of scoliosis of thoracolumbar spine [M41.85]    :  Dr. Geri Doan:  Intra-procedure medications, procedure, and allergies reviewed        Recommendation:    Discharge patient home after discharge instructions reviewed with patient. Rest until local has worn off.

## 2022-02-22 NOTE — PERIOP NOTES
Pt with strong bilateral dorsi and plantar flexion, injection site without drainage. Denies any pain, states this is the first time in 10 years his back has not hurt. Ambulates to chair with good gait, without c/o pain or discomfort. Pt discharged via wheelchair, accompanied by RN. Pt discharged awake and alert, respirations equal and unlabored, skin warm, dry, and intact. Pt and family members' questions and concerns addressed prior to discharge.

## 2022-02-22 NOTE — DISCHARGE INSTRUCTIONS
Discharge Instructions    Lumbar Facet Block/Medial Branch Block    You had a lumbar facet injection today. You will probably have some numbness in your lower back area for the next 6-8 hours. The steroids will slowly become effective, reducing your pain, over the next 2 weeks. You should begin feeling better after a few days, but it may take up to 2 weeks to notice the difference. The benefit you get from your injection will last a variable amount of time, depending on the severity of your spine problem. If the results you experience are significant, but not lasting a long time, you may be a candidate for a procedure that can be longer lasting (radiofrequency ablation of the nerves innervating the facet joints).  Pain:  Most people do not have any increase in pain after this injection. However, you might experience some soreness at the site of the injection. If this happens, putting an icepack over the sore area will help.  Bandage: You have a small bandage covering the site of the injection. You may remove it when you get home.  Restrictions:  Someone should drive you home after the injection. After that, you have no restrictions. You may resume your normal level of activity. You may take a shower or bath, and you may eat normally. You should continue your current exercised and/or therapy routine.  Medications:  Continue your current medications as prescribed. If your pain decreases, you may reduce the amount of your pain medicines. If you stopped taking anticoagulants or blood-thinners before the injection, start them tomorrow. If you have diabetes, your blood sugar may be elevated for a few days. Call your primary doctor with any questions.     Call Dr. Portia Cormier at [phone] if you experience:   Fever (101 degrees Fahrenheit or greater)   Nausea or vomiting   Headache unrelieved by your normal pain medicine   Redness or swelling at the injection site that lasts more than 1 day   New numbness, tingling, weakness, or pain that you didnt have before the injection     If still having pain in 1-2 weeks, call office at [phone] for a follow up appointment. DISCHARGE SUMMARY from Nurse    The following personal items collected during your admission are returned to you:   Dental Appliance: Dental Appliances: None  Vision: Visual Aid: Glasses  Hearing Aid:    Jewelry: Jewelry: None  Clothing: Clothing: With patient  Other Valuables: Other Valuables: Cane (stretcher)  Valuables sent to safe: If you were given prescriptions, please review the written information on prescribed medications. · You will receive a Post Operative Call from one of the Recovery Room Nurses on the day after your surgery to check on you. It is very important for us to know how you are recovering after your surgery. · You may receive an e-mail or letter in the mail from [employer] regarding your experience with us in the Ambulatory Surgery Unit. Your feedback is valuable to us and we appreciate your participation in the survey. If you have not had your influenza or pneumococcal vaccines, please follow up with your primary care physician. The discharge information has been reviewed with the patient. The patient verbalized understanding.

## 2022-04-12 NOTE — PERIOP NOTES
[facility]  Ambulatory Surgery Unit  Pre-operative Instructions    Procedure Date  4/19            Tentative Arrival Time 1:30pm      1. On the day of your procedure, please report to the Ambulatory Surgery Unit Registration Desk and sign in at your designated time. The Ambulatory Surgery Unit is located in [facility] on the [facility] side of the Hospit[facility] in [place] across from the [address] [place] [address]. Please have all of your health insurance cards and a photo ID.    **TWO adults may accompany you the day of the procedure. We have limited seating available. If our waiting room is at capacity, your ride may be asked to remain in their vehicle. No one under 15 is allowed in the waiting room. Masks, fully covering the mouth and nose, are required in the waiting room. 2. You must have someone with you to drive you home as directed by your surgeon. 3. You may have a light breakfast and take normal morning medications. 4. We recommend you do not drink any alcoholic beverages for 24 hours before and after your procedure. 5. Contact your surgeons office for instructions on the following medications: non-steroidal anti-inflammatory drugs (i.e. Advil, Aleve), vitamins, and supplements. (Some surgeons will want you to stop these medications prior to surgery and others may allow you to take them)   **If you are currently taking Plavix, Coumadin, Aspirin and/or other blood-thinning agents, contact your surgeon for instructions. ** Your surgeon will partner with the physician prescribing these medications to determine if it is safe to stop or if you need to continue taking. Please do not stop taking these medications without instructions from your surgeon. 6. In an effort to help prevent surgical site infection, we ask that you shower with an anti-bacterial soap (i.e. Dial or Safeguard) on the morning of your procedure. Do not apply any lotions, powders, or deodorants after showering.      7. Wear comfortable clothes. Wear glasses instead of contacts. Do not bring any jewelry or money (other than copays or fees as instructed). Do not wear make-up, particularly mascara, the morning of your procedure. Wear your hair loose or down, no ponytails, buns, royce pins or clips. All body piercings must be removed. 8. You should understand that if you do not follow these instructions your procedure may be cancelled. If your physical condition changes (i.e. fever, cold or flu) please contact your surgeon as soon as possible. 9. It is important that you be on time. If a situation occurs where you may be late, or if you have any questions or problems, please call [phone].    10. Your procedure time may be subject to change. You will receive a phone call the day prior to confirm your arrival time. I understand a pre-operative phone call will be made to verify my procedure time. In the event that I am not available, I give permission for a message to be left on my answering service and/or with another person?       yes      Reviewed by phone with wife, verbalized understanding.    ___________________      ___________________      ___________________  (Signature of Patient)          (Witness)                   (Date and Time)

## 2022-04-19 ENCOUNTER — APPOINTMENT (OUTPATIENT)
Dept: GENERAL RADIOLOGY | Age: 72
End: 2022-04-19
Attending: PHYSICAL MEDICINE & REHABILITATION
Payer: MEDICARE

## 2022-04-19 ENCOUNTER — HOSPITAL ENCOUNTER (OUTPATIENT)
Age: 72
Setting detail: OUTPATIENT SURGERY
Discharge: HOME | End: 2022-04-19
Attending: PHYSICAL MEDICINE & REHABILITATION | Admitting: PHYSICAL MEDICINE & REHABILITATION
Payer: MEDICARE

## 2022-04-19 VITALS
BODY MASS INDEX: 25.77 KG/M2 | WEIGHT: 180 LBS | HEART RATE: 66 BPM | DIASTOLIC BLOOD PRESSURE: 78 MMHG | HEIGHT: 70 IN | SYSTOLIC BLOOD PRESSURE: 133 MMHG | RESPIRATION RATE: 16 BRPM | TEMPERATURE: 98.8 F | OXYGEN SATURATION: 100 %

## 2022-04-19 PROCEDURE — 74011000250 HC RX REV CODE- 250: Performed by: PHYSICAL MEDICINE & REHABILITATION

## 2022-04-19 PROCEDURE — 2709999900 HC NON-CHARGEABLE SUPPLY: Performed by: PHYSICAL MEDICINE & REHABILITATION

## 2022-04-19 PROCEDURE — 76000 FLUOROSCOPY <1 HR PHYS/QHP: CPT

## 2022-04-19 PROCEDURE — 74011250636 HC RX REV CODE- 250/636: Performed by: PHYSICAL MEDICINE & REHABILITATION

## 2022-04-19 PROCEDURE — 72020 X-RAY EXAM OF SPINE 1 VIEW: CPT

## 2022-04-19 PROCEDURE — 76030000002 HC AMB SURG OR TIME FIRST 0.: Performed by: PHYSICAL MEDICINE & REHABILITATION

## 2022-04-19 PROCEDURE — 76210000046 HC AMBSU PH II REC FIRST 0.5 HR: Performed by: PHYSICAL MEDICINE & REHABILITATION

## 2022-04-19 RX ORDER — BUPIVACAINE HYDROCHLORIDE 5 MG/ML
5 INJECTION, SOLUTION EPIDURAL; INTRACAUDAL ONCE
Status: COMPLETED | OUTPATIENT
Start: 2022-04-19 | End: 2022-04-19

## 2022-04-19 RX ORDER — LIDOCAINE HYDROCHLORIDE 20 MG/ML
5 INJECTION, SOLUTION INFILTRATION; PERINEURAL ONCE
Status: COMPLETED | OUTPATIENT
Start: 2022-04-19 | End: 2022-04-19

## 2022-04-19 RX ORDER — DEXAMETHASONE SODIUM PHOSPHATE 4 MG/ML
6 INJECTION, SOLUTION INTRA-ARTICULAR; INTRALESIONAL; INTRAMUSCULAR; INTRAVENOUS; SOFT TISSUE ONCE
Status: COMPLETED | OUTPATIENT
Start: 2022-04-19 | End: 2022-04-19

## 2022-04-19 NOTE — PERIOP NOTES
Patient received to PACU, VSS. Patient awake and alert with no complaints of pain. Injection site intact. Neuro:  Push/Pull assessment:        LLE Response: push/pull strong and equal   RLE Response: push/pull strong and equal     Discharge instructions given. Patient verbalized understanding of instructions and follow up. Patient states ready for discharge - patient discharged at this time by wheelchair with all belongings. [facility] to provide transportation home.

## 2022-04-19 NOTE — H&P
Procedural Case Note    4/19/2022    (1:49 PM)    Jennifer Adame    1950   (73 y.o.)    [id number]    CC:  pain    ROS:   Complete ROS obtained, no CP, no SOB, no N or V    PMH:     Past Medical History:   Diagnosis Date    Arthritis     back and left knee    Gout     Parkinson's disease ([facility]     S/P deep brain stimulator placement     Thyroid cancer ([facility]     radioactive iodine 4/2020       ALLERGIES:     Allergies   Allergen Reactions    Celebrex [Celecoxib] Other (comments)     Mental status changes    Quetiapine Unknown (comments)     Difficult breathing        Trazodone Unknown (comments)     Difficult breathing       MEDS:     No current facility-administered medications for this encounter. Visit Vitals  /83 (BP 1 Location: Right arm, BP Patient Position: At rest)   Pulse 66   Temp 98.5 °F (36.9 °C)   Resp 16   Ht 5' 10\" (1.778 m)   Wt 81.6 kg (180 lb)   SpO2 97%   BMI 25.83 kg/m²     PE:  Gen: NAD  Head: normocephalic  Heart: RRR  Lungs: CTA luigi  Abd: NT, ND, soft  Neuro: awake and alert  Skin: intact    IMPRESSION:   Lumbar spondylosis    Note:  The clinical status was discussed in detail with the patient. The procedure was again discussed and described in detail. All understand and accept the planned procedure and risks; reject other forms of treatment. All questions are answered.     Tamie Mo MD

## 2022-04-19 NOTE — PERIOP NOTES
Layla Norwood  1950  [id number]    Situation:  Verbal report given from: Lindell Dandy, RN  Procedure: Procedure(s):  BILATERAL L4-5 MEDIAL BRANCH BLOCK    Background:    Preoperative diagnosis: Lumbar spondylosis [M47.816]    Postoperative diagnosis: Lumbar spondylosis [M47.816]    :  Dr. Ese Leggett:  Intra-procedure medications, procedure, and allergies reviewed        Recommendation:    Discharge patient home after discharge instructions reviewed with patient. Rest until local has worn off.

## 2022-04-19 NOTE — OP NOTES
Facet Medial Branch Block Injection Operative Report    Indications: This is a 73 y.o. male who presents with LBP. He was positive for LS DJD. The patient was admitted for diagnostic procedure as conservative measures have failed. Date of Surgery: 4/19/2022    Preoperative Diagnosis: Lumbar Spondylosis    Postoperative Diagnosis: Lumbar Spondylosis    Surgeon(s) and Role:     Peg Whitley MD - Primary     Procedure:  Procedure(s):  BILATERAL L4-5 and L5-S1 MEDIAL BRANCH BLOCK    Procedure in Detail:  After appropriate informed consent was obtained, the patient was taken to the operating suite and placed in the prone position on the operating table on appropriate padding. The LS region was prepped and draped in the usual sterile fashion. Intraoperative fluoroscopy was used to localize the LS spine. The skin was infiltrated with 2% lidocaine. A 25-g needle was advanced into the Prakash L3, L4 and L5 Medial Branches under fluoroscopic guidance. Contrast was injected to confirm needle placement location. Permanent fluoroscopic images were saved in the patient's record. Next, 3ml of 0.5% marcaine and 10mg of Dexamethasone were injected divided equally between locations. The needle was removed from the patient. The patient was then turned back into the supine position on the stretcher and was taken to the Recovery Room in stable condition.     Estimated Blood Loss:  none     Specimens: None       Drains: None          Complications:  None    Signed By: Cristopher Rey MD                        April 19, 2022

## 2022-04-19 NOTE — DISCHARGE INSTRUCTIONS
Discharge Instructions    Lumbar Facet Block/Medial Branch Block    You had a lumbar facet injection today. You will probably have some numbness in your lower back area for the next 6-8 hours. The steroids will slowly become effective, reducing your pain, over the next 2 weeks. You should begin feeling better after a few days, but it may take up to 2 weeks to notice the difference. The benefit you get from your injection will last a variable amount of time, depending on the severity of your spine problem. If the results you experience are significant, but not lasting a long time, you may be a candidate for a procedure that can be longer lasting (radiofrequency ablation of the nerves innervating the facet joints).  Pain:  Most people do not have any increase in pain after this injection. However, you might experience some soreness at the site of the injection. If this happens, putting an icepack over the sore area will help.  Bandage: You have a small bandage covering the site of the injection. You may remove it when you get home.  Restrictions:  Someone should drive you home after the injection. After that, you have no restrictions. You may resume your normal level of activity. You may take a shower or bath, and you may eat normally. You should continue your current exercised and/or therapy routine.  Medications:  Continue your current medications as prescribed. If your pain decreases, you may reduce the amount of your pain medicines. If you stopped taking anticoagulants or blood-thinners before the injection, start them tomorrow. If you have diabetes, your blood sugar may be elevated for a few days. Call your primary doctor with any questions.     Call Dr. Aster Cooley at [phone] if you experience:   Fever (101 degrees Fahrenheit or greater)   Nausea or vomiting   Headache unrelieved by your normal pain medicine   Redness or swelling at the injection site that lasts more than 1 day   New numbness, tingling, weakness, or pain that you didnt have before the injection     If still having pain in 1-2 weeks, call office at [phone] for a follow up appointment. DISCHARGE SUMMARY from Nurse    The following personal items collected during your admission are returned to you:   Dental Appliance: Dental Appliances: None  Vision: Visual Aid: Glasses  Hearing Aid:    Jewelry: Jewelry: None  Clothing: Clothing: With patient  Other Valuables: Other Valuables: Elizabeth Lezama (with family)  Valuables sent to safe: If you were given prescriptions, please review the written information on prescribed medications. · You will receive a Post Operative Call from one of the Recovery Room Nurses on the day after your surgery to check on you. It is very important for us to know how you are recovering after your surgery. · You may receive an e-mail or letter in the mail from [employer] regarding your experience with us in the Ambulatory Surgery Unit. Your feedback is valuable to us and we appreciate your participation in the survey. If you have not had your influenza or pneumococcal vaccines, please follow up with your primary care physician. The discharge information has been reviewed with the patient. The patient verbalized understanding.

## 2022-04-20 NOTE — PERIOP NOTES
[facility]  Ambulatory Surgery Unit  Pre-operative Instructions    Procedure Date  Tuesday May 3rd            Tentative Arrival Time 12:15 pm      1. On the day of your procedure, please report to the Ambulatory Surgery Unit Registration Desk and sign in at your designated time. The Ambulatory Surgery Unit is located in [facility] on the [facility] side of the [facility] across from the [address] [place] [address]. Please have all of your health insurance cards and a photo ID.    **TWO adults may accompany you the day of the procedure. We have limited seating available. If our waiting room is at capacity, your ride may be asked to remain in their vehicle. No one under 15 is allowed in the waiting room. Masks, fully covering the mouth and nose, are required in the waiting room. 2. You must have someone with you to drive you home as directed by your surgeon. 3. You may have a light breakfast and take normal morning medications. 4. We recommend you do not drink any alcoholic beverages for 24 hours before and after your procedure. 5. Contact your surgeons office for instructions on the following medications: non-steroidal anti-inflammatory drugs (i.e. Advil, Aleve), vitamins, and supplements. (Some surgeons will want you to stop these medications prior to surgery and others may allow you to take them)   **If you are currently taking Plavix, Coumadin, Aspirin and/or other blood-thinning agents, contact your surgeon for instructions. ** Your surgeon will partner with the physician prescribing these medications to determine if it is safe to stop or if you need to continue taking. Please do not stop taking these medications without instructions from your surgeon. 6. In an effort to help prevent surgical site infection, we ask that you shower with an anti-bacterial soap (i.e. Dial or Safeguard) on the morning of your procedure.  Do not apply any lotions, powders, or deodorants after showering. 7. Wear comfortable clothes. Wear glasses instead of contacts. Do not bring any jewelry or money (other than copays or fees as instructed). Do not wear make-up, particularly mascara, the morning of your procedure. Wear your hair loose or down, no ponytails, buns, royce pins or clips. All body piercings must be removed. 8. You should understand that if you do not follow these instructions your procedure may be cancelled. If your physical condition changes (i.e. fever, cold or flu) please contact your surgeon as soon as possible. 9. It is important that you be on time. If a situation occurs where you may be late, or if you have any questions or problems, please call [phone].    10. Your procedure time may be subject to change. You will receive a phone call the day prior to confirm your arrival time. I understand a pre-operative phone call will be made to verify my procedure time. In the event that I am not available, I give permission for a message to be left on my answering service and/or with another person?       Yes    BRING BRAIN STIMULATOR REMOTE WITH YOU ON DAY OF SURGERY    Reviewed instructions via telephone, patient verbalized understanding         ___________________      ___________________      ___________________  (Signature of Patient)          (Witness)                   (Date and Time)

## 2022-04-21 NOTE — PERIOP NOTES
Patient and wife called to confirm arrival times and dates.  Patient given permission to speak to wife Shannan Biswas) in regards to any and all information

## 2022-05-03 ENCOUNTER — APPOINTMENT (OUTPATIENT)
Dept: GENERAL RADIOLOGY | Age: 72
End: 2022-05-03
Attending: PHYSICAL MEDICINE & REHABILITATION
Payer: MEDICARE

## 2022-05-03 ENCOUNTER — HOSPITAL ENCOUNTER (OUTPATIENT)
Age: 72
Setting detail: OUTPATIENT SURGERY
Discharge: HOME | End: 2022-05-03
Attending: PHYSICAL MEDICINE & REHABILITATION | Admitting: PHYSICAL MEDICINE & REHABILITATION
Payer: MEDICARE

## 2022-05-03 VITALS
TEMPERATURE: 98.8 F | SYSTOLIC BLOOD PRESSURE: 148 MMHG | RESPIRATION RATE: 18 BRPM | HEIGHT: 70 IN | BODY MASS INDEX: 23.91 KG/M2 | HEART RATE: 59 BPM | OXYGEN SATURATION: 97 % | WEIGHT: 167 LBS | DIASTOLIC BLOOD PRESSURE: 81 MMHG

## 2022-05-03 PROCEDURE — 76030000002 HC AMB SURG OR TIME FIRST 0.: Performed by: PHYSICAL MEDICINE & REHABILITATION

## 2022-05-03 PROCEDURE — 76210000046 HC AMBSU PH II REC FIRST 0.5 HR: Performed by: PHYSICAL MEDICINE & REHABILITATION

## 2022-05-03 PROCEDURE — 74011250636 HC RX REV CODE- 250/636: Performed by: PHYSICAL MEDICINE & REHABILITATION

## 2022-05-03 PROCEDURE — 2709999900 HC NON-CHARGEABLE SUPPLY: Performed by: PHYSICAL MEDICINE & REHABILITATION

## 2022-05-03 PROCEDURE — 77030003665 HC NDL SPN BBMI -A: Performed by: PHYSICAL MEDICINE & REHABILITATION

## 2022-05-03 PROCEDURE — 72020 X-RAY EXAM OF SPINE 1 VIEW: CPT

## 2022-05-03 PROCEDURE — 74011000250 HC RX REV CODE- 250: Performed by: PHYSICAL MEDICINE & REHABILITATION

## 2022-05-03 PROCEDURE — 76000 FLUOROSCOPY <1 HR PHYS/QHP: CPT

## 2022-05-03 RX ORDER — BUPIVACAINE HYDROCHLORIDE 5 MG/ML
10 INJECTION, SOLUTION EPIDURAL; INTRACAUDAL ONCE
Status: COMPLETED | OUTPATIENT
Start: 2022-05-03 | End: 2022-05-03

## 2022-05-03 RX ORDER — DEXAMETHASONE SODIUM PHOSPHATE 4 MG/ML
10 INJECTION, SOLUTION INTRA-ARTICULAR; INTRALESIONAL; INTRAMUSCULAR; INTRAVENOUS; SOFT TISSUE ONCE
Status: COMPLETED | OUTPATIENT
Start: 2022-05-03 | End: 2022-05-03

## 2022-05-03 RX ORDER — LIDOCAINE HYDROCHLORIDE 20 MG/ML
10 INJECTION, SOLUTION INFILTRATION; PERINEURAL ONCE
Status: COMPLETED | OUTPATIENT
Start: 2022-05-03 | End: 2022-05-03

## 2022-05-03 NOTE — OP NOTES
PROCEDURES PERFORMED:   1. Radiofrequency neuroablation of the medial branches Left L3, L4 and L5    PREPROCEDURE DIAGNOSIS: lumbar spondylosis with chronic back pain. POST PROCEDURE DIAGNOSIS: lumbar spondylosis with chronic back pain. SURGEON: Shaun Thomas M.D. HISTORY OF PRESENT ILLNESS AND INDICATIONS FOR THE PROCEDURE: This patient was previously given 2 diagnostic successful blocks of the facet joints innervated by the aforementioned medial branches and is here today for ablation and neurolysis of those branches. He has previously failed conservative management which has proceeded to injection therapy. FINDINGS AND PROCEDURES:  Adequate informed consent was obtained and the patient was taken to the procedure room and placed in the prone position on the procedure table. A time out was held for patient verification. The patient had monitoring throughout the procedure at cycled one minute blood pressure, pulse, and pulse oximetry. The skin was prepped and draped in the normal sterile fashion. Using fluoroscopic guidance in anteroposterior, oblique, and lateral views, the appropriate superior articular processes and pedicles were identified. After identification 1 percent lidocaine skin anesthesia was administered at each site. Using a radiofrequency ablation needle, a 22 gauge 10 millimeter active tip was placed at the medial branch nerves, left L3, L4 and L5. The initial impedance in Ohms was within acceptable limits. Each level was tested at 2 Hertz with no distal motor movement or referred pain pattern. The lesion site was injected with 0.5 milliliters of 0.5 percent bupivacaine anesthesia of the nerve branch. Next a lesion was applied for 90 seconds at 80 degrees. Permanent fluoroscopic images were saved in the patient's record. The patient tolerated the procedure well with no apparent complications. The patient was taken to the recovery area for further monitoring.  Post procedure neurologic examination was consistent with preprocedure examination. The patient was discharged home ambulatory with family and was given post procedure instructions. FOLLOW-UP: The patient will have a follow-up appointment scheduled by the clinic in the coming weeks.

## 2022-05-03 NOTE — PERIOP NOTES
Navin Hendrixjose  1950  [id number]    Situation:  Verbal report given from: JURGEN Myers RN  Procedure: Procedure(s):  LEFT L4-5, L5-S1 RADIO FREQUENCY ABLATION WITH LOCAL    Background:    Preoperative diagnosis: Lumbar spondylosis [M47.816]  Scoliosis of thoracolumbar spine, unspecified scoliosis type [M41.9]  DDD (degenerative disc disease), lumbosacral [M51.37]  Spinal stenosis, lumbar region, without neurogenic claudication [M48.061]  Lumbar facet arthropathy [M47.816]    Postoperative diagnosis: Lumbar spondylosis [M47.816]  Scoliosis of thoracolumbar spine, unspecified scoliosis type [M41.9]  DDD (degenerative disc disease), lumbosacral [M51.37]  Spinal stenosis, lumbar region, without neurogenic claudication [M48.061]  Lumbar facet arthropathy [M47.816]    :  Dr. Tovar House:  Intra-procedure medications, procedure, and allergies reviewed        Recommendation:    Discharge patient home after discharge instructions reviewed with patient. Rest until local has worn off.

## 2022-05-03 NOTE — DISCHARGE INSTRUCTIONS
Radiofrequency Ablation  Discharge Instructions    You had a radiofrequency ablation today. You will probably have some numbness in your lower back area for the next 6-8 hours. You should begin feeling better after a few days, but it may take up to 2 weeks to notice the difference. The benefit you get from your injection will last a variable amount of time, depending on the severity of your spine problem. · Pain:  Most people do not have any increase in pain after this injection. However, you might experience some soreness a few days at the site of the injection. If this happens, putting an ice pack over the sore area will help. · Bandage: You have a small bandage covering the site of the injection. You may remove it when you get home. · Restrictions:  Some one should drive you home after the injection. After that, you have no restrictions. You may resume your normal level of activity. You may take a shower or bath, and you may eat normally. You should continue your current exercise and/or therapy routine. Medications:  Continue your current medications as prescribed. If you pain decreases, you may reduce the amount of your pain medications. If you stopped taking anticoagulants or blood-thinners before the injection, start them tomorrow. Call Dr. Laquita Whitley at [phone] if you experience:    · Fever (101 degrees Fahrenheit or greater)  · Nausea or vomiting  · Headache unrelieved by your normal pain medication  · Redness or swelling at the injection site that lasts more than 1 day  · New numbness, tingling, weakness, or pain that you didn't have before the injection      If still having pain in 1-2 weeks, call office at [phone] for a follow up appointment.         DISCHARGE SUMMARY from Nurse    The following personal items collected during your admission are returned to you:   Dental Appliance: Dental Appliances: None  Vision: Visual Aid: Glasses  Hearing Aid:    Tanika Branch: Tanika Branch: None  Clothing: Clothing: With patient  Other Valuables: Other Valuables: None  Valuables sent to safe: If you were given prescriptions, please review the written information on prescribed medications. · You will receive a Post Operative Call from one of the Recovery Room Nurses on the day after your surgery to check on you. It is very important for us to know how you are recovering after your surgery. · You may receive an e-mail or letter in the mail from [employer] regarding your experience with us in the Ambulatory Surgery Unit. Your feedback is valuable to us and we appreciate your participation in the survey. If you have not had your influenza or pneumococcal vaccines, please follow up with your primary care physician. The discharge information has been reviewed with the patient. The patient verbalized understanding.

## 2022-05-03 NOTE — PERIOP NOTES
Pt with strong bilateral dorsi and plantar flexion. Injection site without drainage. Up to w/c for discharge denies any pain or discomfort. States he feels no pain for the first time in a while. States he can't wait until next week to get the other side done. Pt discharged via wheelchair, accompanied by RN. Pt discharged awake and alert, respirations equal and unlabored, skin warm, dry, and intact. Pt and family members' questions and concerns addressed prior to discharge.

## 2022-05-03 NOTE — H&P
Procedural Case Note    5/3/2022    (1:08 PM)    Pineda Mccann    1950   (73 y.o.)    [id number]    CC:  pain    ROS:   Complete ROS obtained, no CP, no SOB, no N or V    PMH:     Past Medical History:   Diagnosis Date    Arthritis     back and left knee    Gout     [facility] [address] [facility]     S/P deep brain stimulator placement     Thyroid cancer ([facility] [address] [facility]     radioactive iodine 4/2020       ALLERGIES:     Allergies   Allergen Reactions    Celebrex [Celecoxib] Other (comments)     Mental status changes    Quetiapine Unknown (comments)     Difficult breathing        Trazodone Unknown (comments)     Difficult breathing       MEDS:     Current Facility-Administered Medications   Medication Dose Route Frequency    dexamethasone (DECADRON) 10 mg/mL injection 10 mg  10 mg Intra artICUlar ONCE    iohexoL (OMNIPAQUE) 180 mg iodine/mL injection 10 mL  10 mL Intra artICUlar ONCE    lidocaine (XYLOCAINE) 20 mg/mL (2 %) injection 200 mg  10 mL SubCUTAneous ONCE    bupivacaine (PF) (MARCAINE) 0.5 % (5 mg/mL) injection 50 mg  10 mL SubCUTAneous ONCE          Visit Vitals  /80 (BP 1 Location: Right arm, BP Patient Position: At rest)   Pulse 61   Temp 97.9 °F (36.6 °C)   Resp 17   Ht 5' 10\" (1.778 m)   Wt 75.8 kg (167 lb)   SpO2 98%   BMI 23.96 kg/m²     PE:  Gen: NAD  Head: normocephalic  Heart: RRR  Lungs: CTA luigi  Abd: NT, ND, soft  Neuro: awake and alert  Skin: intact    IMPRESSION:   Lumbar spondylosis    Note:  The clinical status was discussed in detail with the patient. The procedure was again discussed and described in detail. All understand and accept the planned procedure and risks; reject other forms of treatment. All questions are answered.     Mayra Del Castillo MD

## 2022-05-04 NOTE — PERIOP NOTES
[facility]  Ambulatory Surgery Unit  Pre-operative Instructions    Procedure Date  5/10            Tentative Arrival Time 09:00am      1. On the day of your procedure, please report to the Ambulatory Surgery Unit Registration Desk and sign in at your designated time. The Ambulatory Surgery Unit is located in [facility] on the [facility] side of the [facility] across from the [address] [place] [address]. Please have all of your health insurance cards and a photo ID.    **TWO adults may accompany you the day of the procedure. We have limited seating available. If our waiting room is at capacity, your ride may be asked to remain in their vehicle. No one under 15 is allowed in the waiting room. Masks, fully covering the mouth and nose, are required in the waiting room. 2. You must have someone with you to drive you home as directed by your surgeon. 3. You may have a light breakfast and take normal morning medications. 4. We recommend you do not drink any alcoholic beverages for 24 hours before and after your procedure. 5. Contact your surgeons office for instructions on the following medications: non-steroidal anti-inflammatory drugs (i.e. Advil, Aleve), vitamins, and supplements. (Some surgeons will want you to stop these medications prior to surgery and others may allow you to take them)   **If you are currently taking Plavix, Coumadin, Aspirin and/or other blood-thinning agents, contact your surgeon for instructions. ** Your surgeon will partner with the physician prescribing these medications to determine if it is safe to stop or if you need to continue taking. Please do not stop taking these medications without instructions from your surgeon. 6. In an effort to help prevent surgical site infection, we ask that you shower with an anti-bacterial soap (i.e. Dial or Safeguard) on the morning of your procedure. Do not apply any lotions, powders, or deodorants after showering.      7. Wear comfortable clothes. Wear glasses instead of contacts. Do not bring any jewelry or money (other than copays or fees as instructed). Do not wear make-up, particularly mascara, the morning of your procedure. Wear your hair loose or down, no ponytails, buns, royce pins or clips. All body piercings must be removed. 8. You should understand that if you do not follow these instructions your procedure may be cancelled. If your physical condition changes (i.e. fever, cold or flu) please contact your surgeon as soon as possible. 9. It is important that you be on time. If a situation occurs where you may be late, or if you have any questions or problems, please call [phone].    10. Your procedure time may be subject to change. You will receive a phone call the day prior to confirm your arrival time. I understand a pre-operative phone call will be made to verify my procedure time. In the event that I am not available, I give permission for a message to be left on my answering service and/or with another person?       yes    Reviewed by phone with pt     ___________________      ___________________      ___________________  (Signature of Patient)          (Witness)                   (Date and Time)

## 2022-05-10 ENCOUNTER — HOSPITAL ENCOUNTER (OUTPATIENT)
Age: 72
Setting detail: OUTPATIENT SURGERY
Discharge: HOME | End: 2022-05-10
Attending: PHYSICAL MEDICINE & REHABILITATION | Admitting: PHYSICAL MEDICINE & REHABILITATION
Payer: MEDICARE

## 2022-05-10 ENCOUNTER — APPOINTMENT (OUTPATIENT)
Dept: GENERAL RADIOLOGY | Age: 72
End: 2022-05-10
Attending: PHYSICAL MEDICINE & REHABILITATION
Payer: MEDICARE

## 2022-05-10 VITALS
OXYGEN SATURATION: 95 % | HEART RATE: 60 BPM | DIASTOLIC BLOOD PRESSURE: 93 MMHG | RESPIRATION RATE: 16 BRPM | WEIGHT: 182 LBS | SYSTOLIC BLOOD PRESSURE: 136 MMHG | HEIGHT: 70 IN | TEMPERATURE: 98.5 F | BODY MASS INDEX: 26.05 KG/M2

## 2022-05-10 PROCEDURE — 77030013367: Performed by: PHYSICAL MEDICINE & REHABILITATION

## 2022-05-10 PROCEDURE — 72020 X-RAY EXAM OF SPINE 1 VIEW: CPT

## 2022-05-10 PROCEDURE — 77030003665 HC NDL SPN BBMI -A: Performed by: PHYSICAL MEDICINE & REHABILITATION

## 2022-05-10 PROCEDURE — 74011250636 HC RX REV CODE- 250/636: Performed by: PHYSICAL MEDICINE & REHABILITATION

## 2022-05-10 PROCEDURE — 2709999900 HC NON-CHARGEABLE SUPPLY: Performed by: PHYSICAL MEDICINE & REHABILITATION

## 2022-05-10 PROCEDURE — 76030000002 HC AMB SURG OR TIME FIRST 0.: Performed by: PHYSICAL MEDICINE & REHABILITATION

## 2022-05-10 PROCEDURE — 74011000250 HC RX REV CODE- 250: Performed by: PHYSICAL MEDICINE & REHABILITATION

## 2022-05-10 PROCEDURE — 76000 FLUOROSCOPY <1 HR PHYS/QHP: CPT

## 2022-05-10 PROCEDURE — 76210000046 HC AMBSU PH II REC FIRST 0.5 HR: Performed by: PHYSICAL MEDICINE & REHABILITATION

## 2022-05-10 RX ORDER — DEXAMETHASONE SODIUM PHOSPHATE 4 MG/ML
6 INJECTION, SOLUTION INTRA-ARTICULAR; INTRALESIONAL; INTRAMUSCULAR; INTRAVENOUS; SOFT TISSUE ONCE
Status: COMPLETED | OUTPATIENT
Start: 2022-05-10 | End: 2022-05-10

## 2022-05-10 RX ORDER — LIDOCAINE HYDROCHLORIDE 20 MG/ML
10 INJECTION, SOLUTION INFILTRATION; PERINEURAL ONCE
Status: COMPLETED | OUTPATIENT
Start: 2022-05-10 | End: 2022-05-10

## 2022-05-10 RX ORDER — BUPIVACAINE HYDROCHLORIDE 5 MG/ML
10 INJECTION, SOLUTION EPIDURAL; INTRACAUDAL ONCE
Status: COMPLETED | OUTPATIENT
Start: 2022-05-10 | End: 2022-05-10

## 2022-05-10 NOTE — PERIOP NOTES
Pt with strong bilateral dorsi and plantar flexion. Injection site without drainage. Stands and ambulates to wheelchair with out c/o pain or discomfort and good gait. Pt discharged via wheelchair, accompanied by RN. Pt discharged awake and alert, respirations equal and unlabored, skin warm, dry, and intact. Pt and family members' questions and concerns addressed prior to discharge.

## 2022-05-10 NOTE — PERIOP NOTES
Kirk Cortez  1950  [id number]    Situation:  Verbal report given from: JURGEN Myers RN  Procedure: Procedure(s):  RIGHT L4 - L5 & L5 - S1 RADIO FREQUENCY ABLATION WITH LOCAL    Background:    Preoperative diagnosis: Lumbar spondylosis [M47.816]  Scoliosis of thoracolumbar spine, unspecified scoliosis type [M41.9]  DDD (degenerative disc disease), lumbosacral [M51.37]  Spinal stenosis, lumbar region, without neurogenic claudication [M48.061]  Lumbar facet arthropathy [M47.816]    Postoperative diagnosis: Lumbar spondylosis [M47.816]  Scoliosis of thoracolumbar spine, unspecified scoliosis type [M41.9]  DDD (degenerative disc disease), lumbosacral [M51.37]  Spinal stenosis, lumbar region, without neurogenic claudication [M48.061]  Lumbar facet arthropathy [M47.816]    :  Dr. Mojica Lab      Assessment:  Intra-procedure medications, procedure, and allergies reviewed        Recommendation:    Discharge patient home after discharge instructions reviewed with patient. Rest until local has worn off.

## 2022-05-10 NOTE — OP NOTES
PROCEDURES PERFORMED:   1. Radiofrequency neuroablation of the medial branches R L3, L4 and L5    PREPROCEDURE DIAGNOSIS: lumbar spondylosis with chronic back pain. POST PROCEDURE DIAGNOSIS: lumbar spondylosis with chronic back pain. SURGEON: Harsh Cortez M.D. HISTORY OF PRESENT ILLNESS AND INDICATIONS FOR THE PROCEDURE: This patient was previously given diagnostic successful blocks of the facet joints innervated by the aforementioned medial branches and is here today for ablation and neurolysis of those branches. He has previously failed conservative management which has proceeded to injection therapy. FINDINGS AND PROCEDURES:  Adequate informed consent was obtained and the patient was taken to the procedure room and placed in the prone position on the procedure table. A time out was held for patient verification. The patient had monitoring throughout the procedure at cycled one minute blood pressure, pulse, and pulse oximetry. The skin was prepped and draped in the normal sterile fashion. Using fluoroscopic guidance in anteroposterior, oblique, and lateral views, the appropriate superior articular processes and pedicles were identified. After identification 1 percent lidocaine skin anesthesia was administered at each site. Using a radiofrequency ablation needle, a 22 gauge 10 millimeter active tip was placed at the medial branch nerves, right L3, L4 and L5. The initial impedance in Ohms was within acceptable limits. Each level was subsequently tested on sensory at 50 megahertz with no referred pattern of pain noted at any level. Each level was also tested at 2 Hertz with no distal motor movement or referred pain pattern. The lesion site was injected with 0.5 milliliters of 0.5 percent bupivacaine anesthesia of the nerve branch. Next a lesion was applied for 90 seconds at 80 degrees. Permanent fluoroscopic images were saved in the patient's record.     The patient tolerated the procedure well with no apparent complications. The patient was taken to the recovery area for further monitoring. Post procedure neurologic examination was consistent with preprocedure examination. The patient was discharged home ambulatory with family and was given post procedure instructions. FOLLOW-UP: The patient will have a follow-up appointment scheduled by the clinic in the coming weeks.

## 2022-05-10 NOTE — DISCHARGE INSTRUCTIONS
Radiofrequency Ablation  Discharge Instructions    You had a radiofrequency ablation today. You will probably have some numbness in your lower back area for the next 6-8 hours. You should begin feeling better after a few days, but it may take up to 2 weeks to notice the difference. The benefit you get from your injection will last a variable amount of time, depending on the severity of your spine problem. · Pain:  Most people do not have any increase in pain after this injection. However, you might experience some soreness a few days at the site of the injection. If this happens, putting an ice pack over the sore area will help. · Bandage: You have a small bandage covering the site of the injection. You may remove it when you get home. · Restrictions:  Some one should drive you home after the injection. After that, you have no restrictions. You may resume your normal level of activity. You may take a shower or bath, and you may eat normally. You should continue your current exercise and/or therapy routine. Medications:  Continue your current medications as prescribed. If you pain decreases, you may reduce the amount of your pain medications. If you stopped taking anticoagulants or blood-thinners before the injection, start them tomorrow. Call Dr. Aleksandra Velasquez at [phone] if you experience:    · Fever (101 degrees Fahrenheit or greater)  · Nausea or vomiting  · Headache unrelieved by your normal pain medication  · Redness or swelling at the injection site that lasts more than 1 day  · New numbness, tingling, weakness, or pain that you didn't have before the injection      If still having pain in 1-2 weeks, call office at [phone] for a follow up appointment.         DISCHARGE SUMMARY from Nurse    The following personal items collected during your admission are returned to you:   Dental Appliance: Dental Appliances: None  Vision: Visual Aid: Glasses  Hearing Aid:    Asad Rosenberg: Asad Rosenberg: None  Clothing: Clothing: With patient  Other Valuables: Other Valuables: Eyeglasses,With patient  Valuables sent to safe: If you were given prescriptions, please review the written information on prescribed medications. · You will receive a Post Operative Call from one of the Recovery Room Nurses on the day after your surgery to check on you. It is very important for us to know how you are recovering after your surgery. · You may receive an e-mail or letter in the mail from [employer] regarding your experience with us in the Ambulatory Surgery Unit. Your feedback is valuable to us and we appreciate your participation in the survey. If you have not had your influenza or pneumococcal vaccines, please follow up with your primary care physician. The discharge information has been reviewed with the patient. The patient verbalized understanding.

## 2022-05-10 NOTE — H&P
Procedural Case Note    5/10/2022    (9:27 AM)    Carmen Zarate    1950   (73 y.o.)    [id number]    CC:  pain    ROS:   Complete ROS obtained, no CP, no SOB, no N or V    PMH:     Past Medical History:   Diagnosis Date    Arthritis     back and left knee    Gout     [facility] ([facility] [address]     S/P deep brain stimulator placement     Thyroid cancer ([facility] [address]     radioactive iodine 4/2020       ALLERGIES:     Allergies   Allergen Reactions    Celebrex [Celecoxib] Other (comments)     Mental status changes    Quetiapine Unknown (comments)     Difficult breathing        Trazodone Unknown (comments)     Difficult breathing       MEDS:     No current facility-administered medications for this encounter. Visit Vitals  Ht 5' 10\" (1.778 m)   Wt 75.8 kg (167 lb)   BMI 23.96 kg/m²     PE:  Gen: NAD  Head: normocephalic  Heart: RRR  Lungs: CTA luigi  Abd: NT, ND, soft  Neuro: awake and alert  Skin: intact    IMPRESSION:   Lumbar spondylosis    Note:  The clinical status was discussed in detail with the patient. The procedure was again discussed and described in detail. All understand and accept the planned procedure and risks; reject other forms of treatment. All questions are answered.     Saranya Reid MD

## 2022-09-12 ENCOUNTER — OFFICE VISIT (OUTPATIENT)
Dept: FAMILY MEDICINE CLINIC | Age: 72
End: 2022-09-12
Payer: MEDICARE

## 2022-09-12 VITALS
RESPIRATION RATE: 16 BRPM | DIASTOLIC BLOOD PRESSURE: 70 MMHG | SYSTOLIC BLOOD PRESSURE: 105 MMHG | OXYGEN SATURATION: 94 % | WEIGHT: 182.6 LBS | BODY MASS INDEX: 26.14 KG/M2 | HEART RATE: 66 BPM | HEIGHT: 70 IN

## 2022-09-12 DIAGNOSIS — M10.9 GOUT, UNSPECIFIED CAUSE, UNSPECIFIED CHRONICITY, UNSPECIFIED SITE: Primary | ICD-10-CM

## 2022-09-12 DIAGNOSIS — Z13.220 SCREENING, LIPID: ICD-10-CM

## 2022-09-12 DIAGNOSIS — Z01.810 PRE-OPERATIVE CARDIOVASCULAR EXAMINATION: ICD-10-CM

## 2022-09-12 DIAGNOSIS — Z01.818 PREOPERATIVE GENERAL PHYSICAL EXAMINATION: ICD-10-CM

## 2022-09-12 PROCEDURE — G0463 HOSPITAL OUTPT CLINIC VISIT: HCPCS | Performed by: FAMILY MEDICINE

## 2022-09-12 PROCEDURE — 1101F PT FALLS ASSESS-DOCD LE1/YR: CPT | Performed by: FAMILY MEDICINE

## 2022-09-12 PROCEDURE — G8427 DOCREV CUR MEDS BY ELIG CLIN: HCPCS | Performed by: FAMILY MEDICINE

## 2022-09-12 PROCEDURE — 1123F ACP DISCUSS/DSCN MKR DOCD: CPT | Performed by: FAMILY MEDICINE

## 2022-09-12 PROCEDURE — 93005 ELECTROCARDIOGRAM TRACING: CPT | Performed by: FAMILY MEDICINE

## 2022-09-12 PROCEDURE — G8510 SCR DEP NEG, NO PLAN REQD: HCPCS | Performed by: FAMILY MEDICINE

## 2022-09-12 PROCEDURE — 93010 ELECTROCARDIOGRAM REPORT: CPT | Performed by: FAMILY MEDICINE

## 2022-09-12 PROCEDURE — 99214 OFFICE O/P EST MOD 30 MIN: CPT | Performed by: FAMILY MEDICINE

## 2022-09-12 PROCEDURE — 3017F COLORECTAL CA SCREEN DOC REV: CPT | Performed by: FAMILY MEDICINE

## 2022-09-12 PROCEDURE — G8417 CALC BMI ABV UP PARAM F/U: HCPCS | Performed by: FAMILY MEDICINE

## 2022-09-12 PROCEDURE — G8536 NO DOC ELDER MAL SCRN: HCPCS | Performed by: FAMILY MEDICINE

## 2022-09-12 NOTE — PROGRESS NOTES
Chief Complaint   Patient presents with    Pre-op Exam     Cataract surgery       Preoperative Evaluation    Date of Exam: 2022    Lori Penaloza is a 73 y.o. male (:1950) who presents for preoperative evaluation. Latex Allergy: no    Problem List:     Patient Active Problem List    Diagnosis Date Noted    Thyroid cancer ([facility] [address]     S/P deep brain stimulator placement     Parkinson's disease ([facility] [address]     Gout     Arthritis      Medical History:     Past Medical History:   Diagnosis Date    Arthritis     back and left knee    Gout     Parkinson's disease ([facility] [address]     S/P deep brain stimulator placement     Thyroid cancer ([facility] [address]     radioactive iodine 2020     Allergies: Allergies   Allergen Reactions    Celebrex [Celecoxib] Other (comments)     Mental status changes    Quetiapine Unknown (comments)     Difficult breathing        Trazodone Unknown (comments)     Difficult breathing      Medications:     Current Outpatient Medications   Medication Sig    diclofenac EC (VOLTAREN) 75 mg EC tablet Take 75 mg by mouth two (2) times a day. carbidopa-levodopa (SINEMET)  mg per tablet Take 3 Tablets by mouth daily. Indications: a type of movement disorder called parkinsonism    carbidopa-levodopa ER (SINEMET CR)  mg per tablet Take 2 Tablets by mouth At bedtime. Takes 2 total throughout night    carbidopa-levodopa-entacapone (STALEVO) tablet See Instructions, 1 tab PO 7 times a day every 2 hours and 15 minutes, 3 Refills, Dispense: 630, tab, Pharmacy [facility]    levodopa (Inbrija) 42 mg CpDv Take 84 mg by inhalation as needed. Up to 5 times daily as needed  Indications: Parkinson's disease    melatonin 10 mg TbMP Take 1 Tablet by mouth.    mirtazapine (REMERON) 45 mg tablet Take 1 Tablet by mouth At bedtime. PARoxetine (PAXIL) 30 mg tablet Take 1 Tablet by mouth At bedtime. polyethylene glycol (MIRALAX) 17 gram/dose powder Take 17 g by mouth daily as needed.     ramelteon (ROZEREM) 8 mg tablet Take 1 Tablet by mouth At bedtime. acetaminophen (TYLENOL) 500 mg tablet Take 1,000 mg by mouth three (3) times daily. glucosamine-chondroitin 250-200 mg tab Take 3 Tablets by mouth daily. cholecalciferol (VITAMIN D3) (5000 Units/125 mcg) tab tablet Take 5,000 Units by mouth daily. b complex vitamins tablet Take 1 Tablet by mouth daily. levothyroxine (SYNTHROID) 75 mcg tablet Take 75 mcg by mouth Daily (before breakfast). Indications: a condition with low thyroid hormone levels     No current facility-administered medications for this visit. Surgical History:     Past Surgical History:   Procedure Laterality Date    HX HEENT      turbinate reduction    HX KNEE REPLACEMENT Right 2013    HX OTHER SURGICAL      epidural steroid injections     HX PARTIAL THYROIDECTOMY Right 10/2019    HX PARTIAL THYROIDECTOMY Left 01/2020    HX RETINAL DETACHMENT REPAIR Right     NEUROLOGICAL PROCEDURE UNLISTED      deep brain stimulator     Social History:     Social History     Socioeconomic History    Marital status:    Tobacco Use    Smoking status: Former    Smokeless tobacco: Never   Vaping Use    Vaping Use: Never used   Substance and Sexual Activity    Alcohol use: Yes     Alcohol/week: 7.0 standard drinks     Types: 7 Cans of beer per week     Comment: a beer a day    Drug use: Never       Anesthesia Complications: None  History of abnormal bleeding : None  History of Blood Transfusions: no  Health Care Directive or Living Will: no    Objective:     ROS:   No unusual headaches or abdominal pain. No cough, wheezing, shortness of breath, bowel or bladder problems. No fever. No bleeding. Diet is good.     OBJECTIVE:   Visit Vitals  /70 (BP 1 Location: Left arm, BP Patient Position: Sitting, BP Cuff Size: Large adult)   Pulse 66   Resp 16   Ht 5' 10\" (1.778 m)   Wt 182 lb 9.6 oz (82.8 kg)   SpO2 94%   BMI 26.20 kg/m²       GENERAL: WDWN male  EYES: PERRLA, EOMI, fundi grossly normal  EARS: TM's gray  VISION and HEARING: Normal.  NOSE: nasal passages clear  NECK: supple, no masses, no lymphadenopathy  RESP: clear to auscultation bilaterally  CV: RRR, normal L9/W9, no murmurs, clicks, or rubs. ABD: soft, nontender, no masses, no hepatosplenomegaly  MS: spine straight, FROM all joints  SKIN: no rashes or lesions      DIAGNOSTICS:   1. EKG: EKG FINDINGS - noisy baseline, occ ectopic beat, sinus rhythm  2.  Labs:   Lab Results   Component Value Date/Time    Glucose 108 (H) 09/12/2022 03:53 PM    LDL, calculated 85.4 09/12/2022 03:53 PM    Creatinine 1.51 (H) 09/12/2022 03:53 PM        IMPRESSION:   Low risk for planned surgery  No contraindications to planned surgery    Arlin Borrego MD   9/12/2022

## 2022-09-12 NOTE — PROGRESS NOTES
Chief Complaint   Patient presents with    Pre-op Exam     Cataract surgery     Pt has detatched retina, pt had surgery but it did not work. Health Maintenance Due   Topic Date Due    Hepatitis C Screening  Never done    Lipid Screen  Never done    Shingrix Vaccine Age 50> (1 of 2) Never done    Pneumococcal 65+ years (1 - PCV) Never done    Medicare Yearly Exam  Never done    COVID-19 Vaccine (4 - Booster for Moderna series) 03/03/2022    Flu Vaccine (1) 09/01/2022     1. \"Have you been to the ER, urgent care clinic since your last visit? Hospitalized since your last visit? \" No    2. \"Have you seen or consulted any other health care providers outside of the [address] [place] [address] since your last visit? \"  Dr. Tip Thakur Neurologist, Dr. Ruthie Penaloza endocrinologist         3. For patients aged 39-70: Has the patient had a colonoscopy / FIT/ Cologuard? No      If the patient is female:    4. For patients aged 41-77: Has the patient had a mammogram within the past 2 years? NA - based on age or sex      11. For patients aged 21-65: Has the patient had a pap smear?  NA - based on age or sex

## 2022-09-13 LAB
ALBUMIN SERPL-MCNC: 4.1 G/DL (ref 3.5–5)
ALBUMIN/GLOB SERPL: 1.3 {RATIO} (ref 1.1–2.2)
ALP SERPL-CCNC: 56 U/L (ref 45–117)
ALT SERPL-CCNC: 10 U/L (ref 12–78)
ANION GAP SERPL CALC-SCNC: 5 MMOL/L (ref 5–15)
AST SERPL-CCNC: 13 U/L (ref 15–37)
BASOPHILS # BLD: 0.1 K/UL (ref 0–0.1)
BASOPHILS NFR BLD: 1 % (ref 0–1)
BILIRUB SERPL-MCNC: 0.8 MG/DL (ref 0.2–1)
BUN SERPL-MCNC: 25 MG/DL (ref 6–20)
BUN/CREAT SERPL: 17 (ref 12–20)
CALCIUM SERPL-MCNC: 9 MG/DL (ref 8.5–10.1)
CHLORIDE SERPL-SCNC: 101 MMOL/L (ref 97–108)
CHOLEST SERPL-MCNC: 151 MG/DL
CO2 SERPL-SCNC: 26 MMOL/L (ref 21–32)
COMMENT, HOLDF: NORMAL
CREAT SERPL-MCNC: 1.51 MG/DL (ref 0.7–1.3)
DIFFERENTIAL METHOD BLD: ABNORMAL
EOSINOPHIL # BLD: 0.2 K/UL (ref 0–0.4)
EOSINOPHIL NFR BLD: 4 % (ref 0–7)
ERYTHROCYTE [DISTWIDTH] IN BLOOD BY AUTOMATED COUNT: 13 % (ref 11.5–14.5)
GLOBULIN SER CALC-MCNC: 3.2 G/DL (ref 2–4)
GLUCOSE SERPL-MCNC: 108 MG/DL (ref 65–100)
HCT VFR BLD AUTO: 32.8 % (ref 36.6–50.3)
HDLC SERPL-MCNC: 44 MG/DL
HDLC SERPL: 3.4 {RATIO} (ref 0–5)
HGB BLD-MCNC: 11.1 G/DL (ref 12.1–17)
IMM GRANULOCYTES # BLD AUTO: 0 K/UL (ref 0–0.04)
IMM GRANULOCYTES NFR BLD AUTO: 0 % (ref 0–0.5)
LDLC SERPL CALC-MCNC: 85.4 MG/DL (ref 0–100)
LYMPHOCYTES # BLD: 1 K/UL (ref 0.8–3.5)
LYMPHOCYTES NFR BLD: 17 % (ref 12–49)
MCH RBC QN AUTO: 33.1 PG (ref 26–34)
MCHC RBC AUTO-ENTMCNC: 33.8 G/DL (ref 30–36.5)
MCV RBC AUTO: 97.9 FL (ref 80–99)
MONOCYTES # BLD: 0.5 K/UL (ref 0–1)
MONOCYTES NFR BLD: 9 % (ref 5–13)
NEUTS SEG # BLD: 3.8 K/UL (ref 1.8–8)
NEUTS SEG NFR BLD: 69 % (ref 32–75)
NRBC # BLD: 0 K/UL (ref 0–0.01)
NRBC BLD-RTO: 0 PER 100 WBC
PLATELET # BLD AUTO: 291 K/UL (ref 150–400)
PMV BLD AUTO: 9.9 FL (ref 8.9–12.9)
POTASSIUM SERPL-SCNC: 4.6 MMOL/L (ref 3.5–5.1)
PROT SERPL-MCNC: 7.3 G/DL (ref 6.4–8.2)
RBC # BLD AUTO: 3.35 M/UL (ref 4.1–5.7)
SAMPLES BEING HELD,HOLD: NORMAL
SODIUM SERPL-SCNC: 132 MMOL/L (ref 136–145)
TRIGL SERPL-MCNC: 108 MG/DL (ref ?–150)
URATE SERPL-MCNC: 5.2 MG/DL (ref 3.5–7.2)
VLDLC SERPL CALC-MCNC: 21.6 MG/DL
WBC # BLD AUTO: 5.6 K/UL (ref 4.1–11.1)

## 2022-09-14 NOTE — PROGRESS NOTES
Poor quality EKG with significant background noise  Sinus rhythm, regular rate  Reviewed with pt during appt

## 2022-09-15 ENCOUNTER — TELEPHONE (OUTPATIENT)
Dept: FAMILY MEDICINE CLINIC | Age: 72
End: 2022-09-15

## 2022-09-15 NOTE — TELEPHONE ENCOUNTER
Pt was calling because he had received a call from the clinic and they have not yet received the Pre Op form.     [phone] (fax) Marion Escobar    Pt has surgery on Tuesday 09/20/2022

## 2022-09-15 NOTE — TELEPHONE ENCOUNTER
2 identifiers verified by Wife Mark Anthony Valdes). Niranjan Gomez informed of pre op being faxed over to [facility] and we have received confirmation.

## 2023-03-23 ENCOUNTER — TELEPHONE (OUTPATIENT)
Dept: FAMILY MEDICINE CLINIC | Age: 73
End: 2023-03-23

## 2023-03-23 NOTE — TELEPHONE ENCOUNTER
verified with pts wife. Wife states patients psychiatrist would like pt to have certain blood work drawn. (BMP, liver function, H and H). Pt has an appt with provider on 3/29/23 for medicare wellness and would like to get labs done during appt.     ----- Message from Joanne Giles sent at 3/23/2023 10:06 AM EDT -----  Subject: Referral Request    Reason for referral request? labs  Provider patient wants to be referred to(if known):     Provider Phone Number(if known):     Additional Information for Provider?   ---------------------------------------------------------------------------  --------------  [id number]    [phone]; OK to leave message on voicemail  ---------------------------------------------------------------------------  --------------

## 2023-03-29 ENCOUNTER — OFFICE VISIT (OUTPATIENT)
Dept: FAMILY MEDICINE CLINIC | Age: 73
End: 2023-03-29
Payer: MEDICARE

## 2023-03-29 VITALS
HEIGHT: 70 IN | DIASTOLIC BLOOD PRESSURE: 79 MMHG | HEART RATE: 70 BPM | OXYGEN SATURATION: 96 % | RESPIRATION RATE: 18 BRPM | BODY MASS INDEX: 26.92 KG/M2 | TEMPERATURE: 97.9 F | SYSTOLIC BLOOD PRESSURE: 115 MMHG | WEIGHT: 188 LBS

## 2023-03-29 DIAGNOSIS — G20 PARKINSON'S DISEASE (HCC): Primary | ICD-10-CM

## 2023-03-29 DIAGNOSIS — M54.30 CHRONIC SCIATICA, UNSPECIFIED LATERALITY: ICD-10-CM

## 2023-03-29 DIAGNOSIS — Z00.00 MEDICARE ANNUAL WELLNESS VISIT, SUBSEQUENT: ICD-10-CM

## 2023-03-29 DIAGNOSIS — Z13.220 SCREENING, LIPID: ICD-10-CM

## 2023-03-29 DIAGNOSIS — C73 THYROID CANCER (HCC): ICD-10-CM

## 2023-03-29 PROCEDURE — G8427 DOCREV CUR MEDS BY ELIG CLIN: HCPCS | Performed by: FAMILY MEDICINE

## 2023-03-29 PROCEDURE — G8536 NO DOC ELDER MAL SCRN: HCPCS | Performed by: FAMILY MEDICINE

## 2023-03-29 PROCEDURE — G0463 HOSPITAL OUTPT CLINIC VISIT: HCPCS | Performed by: FAMILY MEDICINE

## 2023-03-29 PROCEDURE — 1101F PT FALLS ASSESS-DOCD LE1/YR: CPT | Performed by: FAMILY MEDICINE

## 2023-03-29 PROCEDURE — G8510 SCR DEP NEG, NO PLAN REQD: HCPCS | Performed by: FAMILY MEDICINE

## 2023-03-29 PROCEDURE — G8417 CALC BMI ABV UP PARAM F/U: HCPCS | Performed by: FAMILY MEDICINE

## 2023-03-29 PROCEDURE — 1123F ACP DISCUSS/DSCN MKR DOCD: CPT | Performed by: FAMILY MEDICINE

## 2023-03-29 PROCEDURE — 3017F COLORECTAL CA SCREEN DOC REV: CPT | Performed by: FAMILY MEDICINE

## 2023-03-29 PROCEDURE — 99213 OFFICE O/P EST LOW 20 MIN: CPT | Performed by: FAMILY MEDICINE

## 2023-03-29 PROCEDURE — G0439 PPPS, SUBSEQ VISIT: HCPCS | Performed by: FAMILY MEDICINE

## 2023-03-29 NOTE — PATIENT INSTRUCTIONS
Medicare Wellness Visit, Male    The best way to live healthy is to have a lifestyle where you eat a well-balanced diet, exercise regularly, limit alcohol use, and quit all forms of tobacco/nicotine, if applicable. Regular preventive services are another way to keep healthy. Preventive services (vaccines, screening tests, monitoring & exams) can help personalize your care plan, which helps you manage your own care. Screening tests can find health problems at the earliest stages, when they are easiest to treat. [facility] follows the current, evidence-based guidelines published by the [facility] ([facility]) when recommending preventive services for our patients. Because we follow these guidelines, sometimes recommendations change over time as research supports it. (For example, a prostate screening blood test is no longer routinely recommended for men with no symptoms). Of course, you and your doctor may decide to screen more often for some diseases, based on your risk and co-morbidities (chronic disease you are already diagnosed with). Preventive services for you include:  - Medicare offers their members a free annual wellness visit, which is time for you and your primary care provider to discuss and plan for your preventive service needs.  Take advantage of this benefit every year!    -Over the age of 72 should receive the recommended pneumonia vaccines.   -All adults should have a flu vaccine yearly.  -All adults should receive a tetanus vaccine every 10 years.  -Over the age of 48 should receive the shingles vaccines.    -All adults should be screened once for Hepatitis C.  -All adults age 38-68 who are overweight should have a diabetes screening test once every three years.   -Other screening tests & preventive services for persons with diabetes include: an eye exam to screen for diabetic retinopathy, a kidney function test, a foot exam, and stricter control over your cholesterol.   -Cardiovascular screening for adults with routine risk involves an electrocardiogram (ECG) at intervals determined by the provider.     -Colorectal cancer screening should be done for adults age 43-69 with no increased risk factors for colorectal cancer. There are a number of acceptable methods of screening for this type of cancer. Each test has its own benefits and drawbacks. Discuss with your provider what is most appropriate for you during your annual wellness visit. The different tests include: colonoscopy (considered the best screening method), a fecal occult blood test, a fecal DNA test, and sigmoidoscopy.    -Lung cancer screening is recommended annually with a low dose CT scan for adults between age 54 and 68, who have smoked at least 30 pack years (equivalent of 1 pack per day for 30 days), and who is a current smoker or quit less than 15 years ago. -An Abdominal Aortic Aneurysm (AAA) Screening is recommended for men age 73-68 who has ever smoked in their lifetime.      Here is a list of your current Health Maintenance items (your personalized list of preventive services) with a due date:  Health Maintenance Due   Topic Date Due    Hepatitis C Test  Never done

## 2023-03-29 NOTE — PROGRESS NOTES
Chief Complaint   Patient presents with    Annual Wellness Visit     Pt reports that he is doing ok, some back pain, but better controlled. Subjective: (As above and below)     Chief Complaint   Patient presents with    Annual Wellness Visit     he is a 81y.o. year old male who presents for evaluation. Reviewed PmHx, RxHx, FmHx, SocHx, AllgHx and updated in chart. Review of Systems - negative except as listed above    Objective:     Vitals:    03/29/23 1101   BP: 115/79   Pulse: 70   Resp: 18   Temp: 97.9 °F (36.6 °C)   TempSrc: Skin   SpO2: 96%   Weight: 188 lb (85.3 kg)   Height: 5' 10\" (1.778 m)     Physical Examination: General appearance - alert, well appearing, and in no distress  Mental status - normal mood, behavior, speech, dress, motor activity, and thought processes  Ears - bilateral TM's and external ear canals normal  Chest - clear to auscultation, no wheezes, rales or rhonchi, symmetric air entry  Heart - normal rate, regular rhythm, normal S1, S2, no murmurs, rubs, clicks or gallops  Musculoskeletal - no joint tenderness, deformity or swelling  Extremities - peripheral pulses normal, no pedal edema, no clubbing or cyanosis    Assessment/ Plan:   1. Parkinson's disease ([address]  -check labs  - CBC W/O DIFF; Future  - VITAMIN D, 25 HYDROXY; Future  - HEPATITIS C AB; Future    2. Screening, lipid  - METABOLIC PANEL, COMPREHENSIVE; Future  - LIPID PANEL; Future  - HEMOGLOBIN A1C WITH EAG; Future    3. Chronic sciatica, unspecified laterality  -better controlled    4. Thyroid cancer ([facility]  - TSH 3RD GENERATION; Future    5. Medicare annual wellness visit, subsequent  -se below       I have discussed the diagnosis with the patient and the intended plan as seen in the above orders. The patient has received an after-visit summary and questions were answered concerning future plans.      Medication Side Effects and Warnings were discussed with patient: yes  Patient Labs were reviewed: yes  Patient Past Records were reviewed:  yes    Suhail Martinez M.D. This is the Subsequent Medicare Annual Wellness Exam, performed 12 months or more after the Initial AWV or the last Subsequent AWV    I have reviewed the patient's medical history in detail and updated the computerized patient record. Assessment/Plan   Education and counseling provided:  Are appropriate based on today's review and evaluation    1. Parkinson's disease ([facility]  -     CBC W/O DIFF; Future  -     VITAMIN D, 25 HYDROXY; Future  -     HEPATITIS C AB; Future  2. Screening, lipid  -     METABOLIC PANEL, COMPREHENSIVE; Future  -     LIPID PANEL; Future  -     HEMOGLOBIN A1C WITH EAG; Future  3. Chronic sciatica, unspecified laterality  4. Thyroid cancer ([facility]  -     TSH 3RD GENERATION; Future  5.  Medicare annual wellness visit, subsequent       Depression Risk Factor Screening     3 most recent PHQ Screens 3/29/2023   Little interest or pleasure in doing things Not at all   Feeling down, depressed, irritable, or hopeless Not at all   Total Score PHQ 2 0   Trouble falling or staying asleep, or sleeping too much Not at all   Feeling tired or having little energy Not at all   Poor appetite, weight loss, or overeating Not at all   Feeling bad about yourself - or that you are a failure or have let yourself or your family down Not at all   Trouble concentrating on things such as school, work, reading, or watching TV Several days   Moving or speaking so slowly that other people could have noticed; or the opposite being so fidgety that others notice Not at all   Thoughts of being better off dead, or hurting yourself in some way Not at all   PHQ 9 Score 1   How difficult have these problems made it for you to do your work, take care of your home and get along with others Not difficult at all       Alcohol & Drug Abuse Risk Screen   Do you average more than 1 drink per night or more than 7 drinks a week?: No  In the past three months have you had more than 4 drinks containing alcohol on one occasion?: No            Functional Ability and Level of Safety   Hearing:  Hearing: additional comments below  Hearing comments: right ear hearing diminished per patient      Activities of Daily Living: The home contains: handrails, grab bars  Functional ADLs: Patient does total self care     Ambulation:  Patient ambulates: with no difficulty  How far the patient can walk with difficulty: (P) about 1-2K feet, depends on pain  Walking is difficult due to: (P) pain  Walking aids: cane  Additional comments : (P) trekking sticks     Fall Risk:  Fall Risk Assessment, last 12 mths 3/29/2023   Able to walk? Yes   Fall in past 12 months? 0   Do you feel unsteady? 0   Are you worried about falling 0   Is TUG test greater than 12 seconds? -   Is the gait abnormal? -   Number of falls in past 12 months -   Fall with injury?  -     Abuse Screen:  Do you ever feel afraid of your partner?: (P) No  Are you in a relationship with someone who physically or mentally threatens you?: (P) No  Is it safe for you to go home?: (P) Yes        Cognitive Screening   Has your family/caregiver stated any concerns about your memory?: No     Cognitive Screening: Normal - MMSE (Mini Mental Status Exam)    Health Maintenance Due     Health Maintenance Due   Topic Date Due    Hepatitis C Screening  Never done       Patient Care Team   Patient Care Team:  Zoë Webb MD as PCP - General (Family Medicine)  [facility] Empaneled Provider    History     Patient Active Problem List   Diagnosis Code    Thyroid cancer ([address] C73    S/P deep brain stimulator placement Z96.89    Parkinson's disease ([address] [place]    Gout M10.9    Arthritis M19.90     Past Medical History:   Diagnosis Date    Arthritis     back and left knee    Gout     Parkinson's disease ([address]     S/P deep brain stimulator placement     Thyroid cancer ([address]     radioactive iodine 4/2020      Past Surgical History: Procedure Laterality Date    HX HEENT      turbinate reduction    HX KNEE REPLACEMENT Right 2013    HX OTHER SURGICAL      epidural steroid injections     HX PARTIAL THYROIDECTOMY Right 10/2019    HX PARTIAL THYROIDECTOMY Left 01/2020    HX RETINAL DETACHMENT REPAIR Right     UT UNLISTED NEUROLOGICAL/NEUROMUSCULAR DX PX      deep brain stimulator     Current Outpatient Medications   Medication Sig Dispense Refill    diclofenac EC (VOLTAREN) 75 mg EC tablet Take 75 mg by mouth two (2) times a day. carbidopa-levodopa ER (SINEMET CR)  mg per tablet Take 2 Tablets by mouth At bedtime. Takes 2 total throughout night      carbidopa-levodopa-entacapone (STALEVO) tablet See Instructions, 1 tab PO 7 times a day every 2 hours and 15 minutes, 3 Refills, Dispense: 630, [facility]      levodopa (Inbrija) 42 mg CpDv Take 84 mg by inhalation as needed. Up to 5 times daily as needed  Indications: Parkinson's disease      melatonin 10 mg TbMP Take 1 Tablet by mouth.      mirtazapine (REMERON) 45 mg tablet Take 1 Tablet by mouth At bedtime. PARoxetine (PAXIL) 30 mg tablet Take 1 Tablet by mouth At bedtime. polyethylene glycol (MIRALAX) 17 gram/dose powder Take 17 g by mouth daily as needed. ramelteon (ROZEREM) 8 mg tablet Take 1 Tablet by mouth At bedtime. acetaminophen (TYLENOL) 500 mg tablet Take 1,000 mg by mouth three (3) times daily. glucosamine-chondroitin 250-200 mg tab Take 3 Tablets by mouth daily. cholecalciferol (VITAMIN D3) (5000 Units/125 mcg) tab tablet Take 5,000 Units by mouth daily. b complex vitamins tablet Take 1 Tablet by mouth daily. carbidopa-levodopa (SINEMET)  mg per tablet Take 3 Tablets by mouth daily.  Indications: a type of movement disorder called parkinsonism       Allergies   Allergen Reactions    Celebrex [Celecoxib] Other (comments)     Mental status changes    Quetiapine Unknown (comments)     Difficult breathing Trazodone Unknown (comments)     Difficult breathing       No family history on file. Social History     Tobacco Use    Smoking status: Former    Smokeless tobacco: Never   Substance Use Topics    Alcohol use:  Yes     Alcohol/week: 7.0 standard drinks     Types: 7 Cans of beer per week     Comment: a beer a day         Aishwarya Kimble MD

## 2023-03-29 NOTE — PROGRESS NOTES
Gary Lundberg is a 81 y.o. male  Chief Complaint   Patient presents with    Annual Wellness Visit     1. \"Have you been to the ER, urgent care clinic since your last visit? Hospitalized since your last visit? \" No    2. \"Have you seen or consulted any other health care providers outside of the [address] [place] [address] since your last visit? \" No     3. For patients aged 39-70: Has the patient had a colonoscopy / FIT/ Cologuard? Yes - no Care Gap present      If the patient is female:    4. For patients aged 41-77: Has the patient had a mammogram within the past 2 years? NA - based on age or sex      11. For patients aged 21-65: Has the patient had a pap smear?  NA - based on age or sex  Health Maintenance   Topic Date Due    Hepatitis C Screening  Never done    Shingles Vaccine (1 of 2) Never done    Pneumococcal 65+ years (1 - PCV) Never done    Medicare Yearly Exam  Never done    GFR test (Diabetes, CKD 3-4, OR last GFR 15-59)  09/12/2023    Depression Monitoring  03/28/2024    Colorectal Cancer Screening Combo  11/18/2024    Lipid Screen  09/12/2027    DTaP/Tdap/Td series (3 - Td or Tdap) 12/25/2031    Flu Vaccine  Completed    COVID-19 Vaccine  Completed     Visit Vitals  /79 (BP 1 Location: Right arm, BP Patient Position: At rest, BP Cuff Size: Large adult)   Pulse 70   Temp 97.9 °F (36.6 °C) (Skin)   Resp 18   Ht 5' 10\" (1.778 m)   Wt 188 lb (85.3 kg)   SpO2 96%   BMI 26.98 kg/m²

## 2023-03-30 LAB
25(OH)D3 SERPL-MCNC: 57 NG/ML (ref 30–100)
ALBUMIN SERPL-MCNC: 4.2 G/DL (ref 3.5–5)
ALBUMIN/GLOB SERPL: 1.4 (ref 1.1–2.2)
ALP SERPL-CCNC: 63 U/L (ref 45–117)
ALT SERPL-CCNC: 12 U/L (ref 12–78)
ANION GAP SERPL CALC-SCNC: 5 MMOL/L (ref 5–15)
AST SERPL-CCNC: 25 U/L (ref 15–37)
BILIRUB SERPL-MCNC: 0.9 MG/DL (ref 0.2–1)
BUN SERPL-MCNC: 15 MG/DL (ref 6–20)
BUN/CREAT SERPL: 15 (ref 12–20)
CALCIUM SERPL-MCNC: 9.6 MG/DL (ref 8.5–10.1)
CHLORIDE SERPL-SCNC: 102 MMOL/L (ref 97–108)
CHOLEST SERPL-MCNC: 157 MG/DL
CO2 SERPL-SCNC: 26 MMOL/L (ref 21–32)
CREAT SERPL-MCNC: 1.03 MG/DL (ref 0.7–1.3)
ERYTHROCYTE [DISTWIDTH] IN BLOOD BY AUTOMATED COUNT: 12 % (ref 11.5–14.5)
EST. AVERAGE GLUCOSE BLD GHB EST-MCNC: 97 MG/DL
GLOBULIN SER CALC-MCNC: 2.9 G/DL (ref 2–4)
GLUCOSE SERPL-MCNC: 116 MG/DL (ref 65–100)
HBA1C MFR BLD: 5 % (ref 4–5.6)
HCT VFR BLD AUTO: 35.6 % (ref 36.6–50.3)
HCV AB SERPL QL IA: NONREACTIVE
HDLC SERPL-MCNC: 53 MG/DL
HDLC SERPL: 3 (ref 0–5)
HGB BLD-MCNC: 11.8 G/DL (ref 12.1–17)
LDLC SERPL CALC-MCNC: 74.6 MG/DL (ref 0–100)
MCH RBC QN AUTO: 32.5 PG (ref 26–34)
MCHC RBC AUTO-ENTMCNC: 33.1 G/DL (ref 30–36.5)
MCV RBC AUTO: 98.1 FL (ref 80–99)
NRBC # BLD: 0 K/UL (ref 0–0.01)
NRBC BLD-RTO: 0 PER 100 WBC
PLATELET # BLD AUTO: 341 K/UL (ref 150–400)
PMV BLD AUTO: 9.6 FL (ref 8.9–12.9)
POTASSIUM SERPL-SCNC: 4.4 MMOL/L (ref 3.5–5.1)
PROT SERPL-MCNC: 7.1 G/DL (ref 6.4–8.2)
RBC # BLD AUTO: 3.63 M/UL (ref 4.1–5.7)
SODIUM SERPL-SCNC: 133 MMOL/L (ref 136–145)
TRIGL SERPL-MCNC: 147 MG/DL (ref ?–150)
TSH SERPL DL<=0.05 MIU/L-ACNC: 25.8 UIU/ML (ref 0.36–3.74)
VLDLC SERPL CALC-MCNC: 29.4 MG/DL
WBC # BLD AUTO: 5.8 K/UL (ref 4.1–11.1)

## 2024-03-06 ENCOUNTER — OFFICE VISIT (OUTPATIENT)
Age: 74
End: 2024-03-06
Payer: MEDICARE

## 2024-03-06 VITALS
HEIGHT: 70 IN | HEART RATE: 69 BPM | RESPIRATION RATE: 16 BRPM | WEIGHT: 181 LBS | OXYGEN SATURATION: 92 % | BODY MASS INDEX: 25.91 KG/M2 | DIASTOLIC BLOOD PRESSURE: 75 MMHG | SYSTOLIC BLOOD PRESSURE: 127 MMHG

## 2024-03-06 DIAGNOSIS — Z12.5 ENCOUNTER FOR SCREENING FOR MALIGNANT NEOPLASM OF PROSTATE: ICD-10-CM

## 2024-03-06 DIAGNOSIS — G20.A1 PARKINSON'S DISEASE, UNSPECIFIED WHETHER DYSKINESIA PRESENT, UNSPECIFIED WHETHER MANIFESTATIONS FLUCTUATE: ICD-10-CM

## 2024-03-06 DIAGNOSIS — C73 THYROID CANCER (HCC): ICD-10-CM

## 2024-03-06 DIAGNOSIS — Z00.00 MEDICARE ANNUAL WELLNESS VISIT, SUBSEQUENT: Primary | ICD-10-CM

## 2024-03-06 PROCEDURE — 3017F COLORECTAL CA SCREEN DOC REV: CPT | Performed by: FAMILY MEDICINE

## 2024-03-06 PROCEDURE — 99214 OFFICE O/P EST MOD 30 MIN: CPT | Performed by: FAMILY MEDICINE

## 2024-03-06 PROCEDURE — 1123F ACP DISCUSS/DSCN MKR DOCD: CPT | Performed by: FAMILY MEDICINE

## 2024-03-06 PROCEDURE — 1036F TOBACCO NON-USER: CPT | Performed by: FAMILY MEDICINE

## 2024-03-06 PROCEDURE — G8427 DOCREV CUR MEDS BY ELIG CLIN: HCPCS | Performed by: FAMILY MEDICINE

## 2024-03-06 PROCEDURE — G0439 PPPS, SUBSEQ VISIT: HCPCS | Performed by: FAMILY MEDICINE

## 2024-03-06 PROCEDURE — G8419 CALC BMI OUT NRM PARAM NOF/U: HCPCS | Performed by: FAMILY MEDICINE

## 2024-03-06 PROCEDURE — G8484 FLU IMMUNIZE NO ADMIN: HCPCS | Performed by: FAMILY MEDICINE

## 2024-03-06 RX ORDER — MELATONIN 10 MG
1 CAPSULE ORAL
COMMUNITY
Start: 2021-09-17

## 2024-03-06 SDOH — ECONOMIC STABILITY: FOOD INSECURITY: WITHIN THE PAST 12 MONTHS, THE FOOD YOU BOUGHT JUST DIDN'T LAST AND YOU DIDN'T HAVE MONEY TO GET MORE.: NEVER TRUE

## 2024-03-06 SDOH — ECONOMIC STABILITY: HOUSING INSECURITY
IN THE LAST 12 MONTHS, WAS THERE A TIME WHEN YOU DID NOT HAVE A STEADY PLACE TO SLEEP OR SLEPT IN A SHELTER (INCLUDING NOW)?: NO

## 2024-03-06 SDOH — ECONOMIC STABILITY: FOOD INSECURITY: WITHIN THE PAST 12 MONTHS, YOU WORRIED THAT YOUR FOOD WOULD RUN OUT BEFORE YOU GOT MONEY TO BUY MORE.: NEVER TRUE

## 2024-03-06 SDOH — ECONOMIC STABILITY: INCOME INSECURITY: HOW HARD IS IT FOR YOU TO PAY FOR THE VERY BASICS LIKE FOOD, HOUSING, MEDICAL CARE, AND HEATING?: NOT HARD AT ALL

## 2024-03-06 ASSESSMENT — PATIENT HEALTH QUESTIONNAIRE - PHQ9
1. LITTLE INTEREST OR PLEASURE IN DOING THINGS: 1
SUM OF ALL RESPONSES TO PHQ9 QUESTIONS 1 & 2: 2
2. FEELING DOWN, DEPRESSED OR HOPELESS: 1
SUM OF ALL RESPONSES TO PHQ QUESTIONS 1-9: 2

## 2024-03-06 ASSESSMENT — LIFESTYLE VARIABLES
HOW OFTEN DO YOU HAVE A DRINK CONTAINING ALCOHOL: MONTHLY OR LESS
HOW MANY STANDARD DRINKS CONTAINING ALCOHOL DO YOU HAVE ON A TYPICAL DAY: 1 OR 2

## 2024-03-06 NOTE — PROGRESS NOTES
Chief Complaint   Patient presents with    Medicare AW     Pt reports that he recently had a repeat back ablation, has been recommended to see a back surgeon. Was referred by Dr. Bee. Pt has an appointment with Dr. Rothman.     Pt is followed by neurology for Parkinsons, has been stable for quite a while.     Endo is following thyroid every 6 months. TSH is currently high but monitored.     Pt is followed by psych, seen every 2-3 months  Kamille Sosaco - [phone]. Please fax lab results     Medicare Annual Wellness Visit    Fan Ciera is here for Medicare AWV    Assessment & Plan   Medicare annual wellness visit, subsequent  -     CBC with Auto Differential; Future  -     Comprehensive Metabolic Panel; Future  -     Hemoglobin A1C; Future  -     Lipid Panel; Future  -     PSA Screening; Future  Thyroid cancer (HCC)  -pt is followed by endo, has been stable, no symptoms  Parkinson's disease, unspecified whether dyskinesia present, unspecified whether manifestations fluctuate  Encounter for screening for malignant neoplasm of prostate  -     PSA Screening; Future    Recommendations for Preventive Services Due: see orders and patient instructions/AVS.  Recommended screening schedule for the next 5-10 years is provided to the patient in written form: see Patient Instructions/AVS.     No follow-ups on file.     Subjective   The following acute and/or chronic problems were also addressed today:  See above    Patient's complete Health Risk Assessment and screening values have been reviewed and are found in Flowsheets. The following problems were reviewed today and where indicated follow up appointments were made and/or referrals ordered.    Positive Risk Factor Screenings with Interventions:              Self-assessment of health:  In general, how would you say your health is?: (!) Poor    Interventions:  Pt is followed by multiple specialists       Dentist Screen:  Have you seen the dentist within the past

## 2024-03-06 NOTE — PATIENT INSTRUCTIONS

## 2024-03-06 NOTE — PROGRESS NOTES
Chief Complaint   Patient presents with    Medicare AWV         Health Maintenance Due   Topic Date Due    Shingles vaccine (1 of 2) Never done    Respiratory Syncytial Virus (RSV) Pregnant or age 60 yrs+ (1 - 1-dose 60+ series) Never done    Pneumococcal 65+ years Vaccine (1 - PCV) Never done    AAA screen  Never done    COVID-19 Vaccine (6 - 2023-24 season) 11/29/2023    Annual Wellness Visit (Medicare)  Never done    Depression Screen  03/29/2024         \"Have you been to the ER, urgent care clinic since your last visit?  Hospitalized since your last visit?\"    NO    “Have you seen or consulted any other health care providers outside of [facility] since your last visit?”    Yes, Dr. Bishop psychiatrist, Endocrinologist [facility], Junior Corral neuologist [facility], [facility]

## 2024-03-07 ENCOUNTER — TELEPHONE (OUTPATIENT)
Age: 74
End: 2024-03-07

## 2024-03-07 LAB
ALBUMIN SERPL-MCNC: 4.1 G/DL (ref 3.5–5)
ALP SERPL-CCNC: 69 U/L (ref 45–117)
ALT SERPL-CCNC: 11 U/L (ref 12–78)
ANION GAP SERPL CALC-SCNC: 4 MMOL/L (ref 5–15)
AST SERPL-CCNC: 20 U/L (ref 15–37)
BASOPHILS # BLD: 0.1 K/UL (ref 0–0.1)
BASOPHILS NFR BLD: 2 % (ref 0–1)
BILIRUB SERPL-MCNC: 0.9 MG/DL (ref 0.2–1)
BUN SERPL-MCNC: 19 MG/DL (ref 6–20)
BUN/CREAT SERPL: 17 (ref 12–20)
CALCIUM SERPL-MCNC: 9.4 MG/DL (ref 8.5–10.1)
CHOLEST SERPL-MCNC: 170 MG/DL
CO2 SERPL-SCNC: 25 MMOL/L (ref 21–32)
DIFFERENTIAL METHOD BLD: ABNORMAL
EOSINOPHIL # BLD: 0.2 K/UL (ref 0–0.4)
EOSINOPHIL NFR BLD: 3 % (ref 0–7)
ERYTHROCYTE [DISTWIDTH] IN BLOOD BY AUTOMATED COUNT: 12.6 % (ref 11.5–14.5)
EST. AVERAGE GLUCOSE BLD GHB EST-MCNC: 94 MG/DL
GLUCOSE SERPL-MCNC: 102 MG/DL (ref 65–100)
HBA1C MFR BLD: 4.9 % (ref 4–5.6)
HCT VFR BLD AUTO: 37.9 % (ref 36.6–50.3)
HDLC SERPL-MCNC: 58 MG/DL
HDLC SERPL: 2.9 (ref 0–5)
HGB BLD-MCNC: 12.8 G/DL (ref 12.1–17)
IMM GRANULOCYTES # BLD AUTO: 0 K/UL (ref 0–0.04)
IMM GRANULOCYTES NFR BLD AUTO: 0 % (ref 0–0.5)
LDLC SERPL CALC-MCNC: 79.6 MG/DL (ref 0–100)
LYMPHOCYTES # BLD: 1 K/UL (ref 0.8–3.5)
LYMPHOCYTES NFR BLD: 19 % (ref 12–49)
MCH RBC QN AUTO: 33.4 PG (ref 26–34)
MCV RBC AUTO: 99 FL (ref 80–99)
MONOCYTES # BLD: 0.5 K/UL (ref 0–1)
MONOCYTES NFR BLD: 8 % (ref 5–13)
NEUTS SEG # BLD: 3.7 K/UL (ref 1.8–8)
NEUTS SEG NFR BLD: 68 % (ref 32–75)
NRBC BLD-RTO: 0 PER 100 WBC
PLATELET # BLD AUTO: 358 K/UL (ref 150–400)
PMV BLD AUTO: 9.3 FL (ref 8.9–12.9)
PROT SERPL-MCNC: 7.3 G/DL (ref 6.4–8.2)
PSA SERPL-MCNC: 2.2 NG/ML (ref 0.01–4)
RBC # BLD AUTO: 3.83 M/UL (ref 4.1–5.7)
SODIUM SERPL-SCNC: 131 MMOL/L (ref 136–145)
SPECIMEN HOLD: NORMAL
TRIGL SERPL-MCNC: 162 MG/DL
VLDLC SERPL CALC-MCNC: 32.4 MG/DL
WBC # BLD AUTO: 5.5 K/UL (ref 4.1–11.1)

## 2024-03-07 NOTE — TELEPHONE ENCOUNTER
----- Message from Shi Ochoa MD sent at 3/6/2024 11:30 AM EST -----  Kamille Mejia - [phone]. Please fax lab results

## 2024-06-13 DIAGNOSIS — E87.1 HYPONATREMIA: Primary | ICD-10-CM

## 2024-06-24 ENCOUNTER — TELEPHONE (OUTPATIENT)
Age: 74
End: 2024-06-24

## 2024-06-25 NOTE — TELEPHONE ENCOUNTER
verified with pt wife. Informed wife of message from provider regarding labs. Wife verified understanding and had no further questions.

## 2024-09-14 ENCOUNTER — HOSPITAL ENCOUNTER (OUTPATIENT)
Facility: HOSPITAL | Age: 74
Discharge: HOME | End: 2024-09-17
Attending: PHYSICAL MEDICINE & REHABILITATION
Payer: MEDICARE

## 2024-09-14 DIAGNOSIS — M47.816 LUMBAR FACET ARTHROPATHY: ICD-10-CM

## 2024-09-14 DIAGNOSIS — M48.061 LUMBAR FORAMINAL STENOSIS: ICD-10-CM

## 2024-09-14 DIAGNOSIS — M48.061 SPINAL STENOSIS OF LUMBAR REGION WITHOUT NEUROGENIC CLAUDICATION: ICD-10-CM

## 2024-09-14 DIAGNOSIS — M47.816 LUMBAR SPONDYLOSIS: ICD-10-CM

## 2024-09-14 DIAGNOSIS — M51.36 DDD (DEGENERATIVE DISC DISEASE), LUMBAR: ICD-10-CM

## 2024-09-14 DIAGNOSIS — M41.50 DEGENERATIVE SCOLIOSIS: ICD-10-CM

## 2024-09-14 DIAGNOSIS — M51.16 LUMBAR DISC DISEASE WITH RADICULOPATHY: ICD-10-CM

## 2024-09-14 PROCEDURE — 72131 CT LUMBAR SPINE W/O DYE: CPT

## 2024-10-31 ENCOUNTER — HOSPITAL ENCOUNTER (INPATIENT)
Facility: HOSPITAL | Age: 74
LOS: 6 days | Discharge: NURSING, REHAB OR LONG-TERM CARE | DRG: 057 | End: 2024-11-06
Attending: EMERGENCY MEDICINE | Admitting: INTERNAL MEDICINE
Payer: MEDICARE

## 2024-10-31 ENCOUNTER — APPOINTMENT (OUTPATIENT)
Facility: HOSPITAL | Age: 74
DRG: 057 | End: 2024-10-31
Payer: MEDICARE

## 2024-10-31 DIAGNOSIS — F32.A DEPRESSION, UNSPECIFIED DEPRESSION TYPE: ICD-10-CM

## 2024-10-31 DIAGNOSIS — I10 ESSENTIAL HYPERTENSION: ICD-10-CM

## 2024-10-31 DIAGNOSIS — G20.B1 PARKINSON'S DISEASE WITH DYSKINESIA, UNSPECIFIED WHETHER MANIFESTATIONS FLUCTUATE (HCC): ICD-10-CM

## 2024-10-31 DIAGNOSIS — G93.40 ENCEPHALOPATHY ACUTE: Primary | ICD-10-CM

## 2024-10-31 PROBLEM — R41.0 CONFUSION AND DISORIENTATION: Status: ACTIVE | Noted: 2024-10-31

## 2024-10-31 LAB
ALBUMIN SERPL-MCNC: 3.6 G/DL (ref 3.5–5)
ALBUMIN/GLOB SERPL: 1.1 (ref 1.1–2.2)
ALP SERPL-CCNC: 64 U/L (ref 45–117)
ALT SERPL-CCNC: <6 U/L (ref 12–78)
AMMONIA PLAS-SCNC: 26 UMOL/L
ANION GAP SERPL CALC-SCNC: 4 MMOL/L (ref 2–12)
APPEARANCE UR: CLEAR
AST SERPL-CCNC: 27 U/L (ref 15–37)
BACTERIA URNS QL MICRO: NEGATIVE /HPF
BASOPHILS # BLD: 0 K/UL (ref 0–0.1)
BASOPHILS NFR BLD: 1 % (ref 0–1)
BILIRUB SERPL-MCNC: 1.2 MG/DL (ref 0.2–1)
BILIRUB UR QL: NEGATIVE
BUN SERPL-MCNC: 16 MG/DL (ref 6–20)
BUN/CREAT SERPL: 25 (ref 12–20)
CALCIUM SERPL-MCNC: 8.5 MG/DL (ref 8.5–10.1)
CHLORIDE SERPL-SCNC: 109 MMOL/L (ref 97–108)
CO2 SERPL-SCNC: 27 MMOL/L (ref 21–32)
COLOR UR: ABNORMAL
CREAT SERPL-MCNC: 0.64 MG/DL (ref 0.7–1.3)
DIFFERENTIAL METHOD BLD: ABNORMAL
EOSINOPHIL # BLD: 0.2 K/UL (ref 0–0.4)
EOSINOPHIL NFR BLD: 5 % (ref 0–7)
EPITH CASTS URNS QL MICRO: ABNORMAL /LPF
ERYTHROCYTE [DISTWIDTH] IN BLOOD BY AUTOMATED COUNT: 13.1 % (ref 11.5–14.5)
FOLATE SERPL-MCNC: 40 NG/ML (ref 5–21)
GLOBULIN SER CALC-MCNC: 3.2 G/DL (ref 2–4)
GLUCOSE SERPL-MCNC: 107 MG/DL (ref 65–100)
GLUCOSE UR STRIP.AUTO-MCNC: NEGATIVE MG/DL
HCT VFR BLD AUTO: 34.4 % (ref 36.6–50.3)
HGB BLD-MCNC: 11.8 G/DL (ref 12.1–17)
HGB UR QL STRIP: NEGATIVE
HYALINE CASTS URNS QL MICRO: ABNORMAL /LPF (ref 0–2)
IMM GRANULOCYTES # BLD AUTO: 0 K/UL (ref 0–0.04)
IMM GRANULOCYTES NFR BLD AUTO: 0 % (ref 0–0.5)
KETONES UR QL STRIP.AUTO: ABNORMAL MG/DL
LEUKOCYTE ESTERASE UR QL STRIP.AUTO: NEGATIVE
LYMPHOCYTES # BLD: 0.7 K/UL (ref 0.8–3.5)
LYMPHOCYTES NFR BLD: 15 % (ref 12–49)
MAGNESIUM SERPL-MCNC: 2.2 MG/DL (ref 1.6–2.4)
MCH RBC QN AUTO: 32.6 PG (ref 26–34)
MCHC RBC AUTO-ENTMCNC: 34.3 G/DL (ref 30–36.5)
MCV RBC AUTO: 95 FL (ref 80–99)
MONOCYTES # BLD: 0.5 K/UL (ref 0–1)
MONOCYTES NFR BLD: 10 % (ref 5–13)
NEUTS SEG # BLD: 3.3 K/UL (ref 1.8–8)
NEUTS SEG NFR BLD: 69 % (ref 32–75)
NITRITE UR QL STRIP.AUTO: NEGATIVE
NRBC # BLD: 0 K/UL (ref 0–0.01)
NRBC BLD-RTO: 0 PER 100 WBC
PH UR STRIP: 7.5 (ref 5–8)
PLATELET # BLD AUTO: 265 K/UL (ref 150–400)
PMV BLD AUTO: 9.2 FL (ref 8.9–12.9)
POTASSIUM SERPL-SCNC: 3.5 MMOL/L (ref 3.5–5.1)
PROT SERPL-MCNC: 6.8 G/DL (ref 6.4–8.2)
PROT UR STRIP-MCNC: NEGATIVE MG/DL
RBC # BLD AUTO: 3.62 M/UL (ref 4.1–5.7)
RBC #/AREA URNS HPF: ABNORMAL /HPF (ref 0–5)
RBC MORPH BLD: ABNORMAL
SODIUM SERPL-SCNC: 140 MMOL/L (ref 136–145)
SP GR UR REFRACTOMETRY: 1.02
T4 FREE SERPL-MCNC: 0.5 NG/DL (ref 0.8–1.5)
TSH SERPL DL<=0.05 MIU/L-ACNC: 11.9 UIU/ML (ref 0.36–3.74)
URINE CULTURE IF INDICATED: ABNORMAL
UROBILINOGEN UR QL STRIP.AUTO: 1 EU/DL (ref 0.2–1)
VIT B12 SERPL-MCNC: 394 PG/ML (ref 193–986)
WBC # BLD AUTO: 4.7 K/UL (ref 4.1–11.1)
WBC URNS QL MICRO: ABNORMAL /HPF (ref 0–4)

## 2024-10-31 PROCEDURE — 1100000003 HC PRIVATE W/ TELEMETRY

## 2024-10-31 PROCEDURE — 85025 COMPLETE CBC W/AUTO DIFF WBC: CPT

## 2024-10-31 PROCEDURE — 6360000002 HC RX W HCPCS: Performed by: INTERNAL MEDICINE

## 2024-10-31 PROCEDURE — 84439 ASSAY OF FREE THYROXINE: CPT

## 2024-10-31 PROCEDURE — 82607 VITAMIN B-12: CPT

## 2024-10-31 PROCEDURE — 83735 ASSAY OF MAGNESIUM: CPT

## 2024-10-31 PROCEDURE — 36415 COLL VENOUS BLD VENIPUNCTURE: CPT

## 2024-10-31 PROCEDURE — 80053 COMPREHEN METABOLIC PANEL: CPT

## 2024-10-31 PROCEDURE — 82140 ASSAY OF AMMONIA: CPT

## 2024-10-31 PROCEDURE — 84443 ASSAY THYROID STIM HORMONE: CPT

## 2024-10-31 PROCEDURE — 6370000000 HC RX 637 (ALT 250 FOR IP): Performed by: INTERNAL MEDICINE

## 2024-10-31 PROCEDURE — 99285 EMERGENCY DEPT VISIT HI MDM: CPT

## 2024-10-31 PROCEDURE — 70450 CT HEAD/BRAIN W/O DYE: CPT

## 2024-10-31 PROCEDURE — 81001 URINALYSIS AUTO W/SCOPE: CPT

## 2024-10-31 PROCEDURE — 82746 ASSAY OF FOLIC ACID SERUM: CPT

## 2024-10-31 RX ORDER — ACETAMINOPHEN 500 MG
1000 TABLET ORAL 3 TIMES DAILY
Status: DISCONTINUED | OUTPATIENT
Start: 2024-10-31 | End: 2024-11-06 | Stop reason: HOSPADM

## 2024-10-31 RX ORDER — POLYETHYLENE GLYCOL 3350 17 G/17G
17 POWDER, FOR SOLUTION ORAL DAILY PRN
Status: DISCONTINUED | OUTPATIENT
Start: 2024-10-31 | End: 2024-11-06 | Stop reason: HOSPADM

## 2024-10-31 RX ORDER — CARBIDOPA, LEVODOPA AND ENTACAPONE 31.25; 200; 125 MG/1; MG/1; MG/1
1 TABLET, FILM COATED ORAL
Status: DISCONTINUED | OUTPATIENT
Start: 2024-10-31 | End: 2024-10-31

## 2024-10-31 RX ORDER — CARBIDOPA AND LEVODOPA 25; 100 MG/1; MG/1
1 TABLET ORAL SEE ADMIN INSTRUCTIONS
Status: DISCONTINUED | OUTPATIENT
Start: 2024-11-01 | End: 2024-10-31

## 2024-10-31 RX ORDER — CARBIDOPA AND LEVODOPA 50; 200 MG/1; MG/1
1 TABLET, EXTENDED RELEASE ORAL 3 TIMES DAILY
Status: DISCONTINUED | OUTPATIENT
Start: 2024-11-01 | End: 2024-11-06 | Stop reason: HOSPADM

## 2024-10-31 RX ORDER — HYDRALAZINE HYDROCHLORIDE 20 MG/ML
10 INJECTION INTRAMUSCULAR; INTRAVENOUS EVERY 6 HOURS PRN
Status: DISCONTINUED | OUTPATIENT
Start: 2024-10-31 | End: 2024-11-06 | Stop reason: HOSPADM

## 2024-10-31 RX ORDER — ONDANSETRON 2 MG/ML
4 INJECTION INTRAMUSCULAR; INTRAVENOUS EVERY 6 HOURS PRN
Status: DISCONTINUED | OUTPATIENT
Start: 2024-10-31 | End: 2024-11-06 | Stop reason: HOSPADM

## 2024-10-31 RX ORDER — CARBIDOPA AND LEVODOPA 25; 100 MG/1; MG/1
1 TABLET ORAL EVERY 24 HOURS
Status: DISCONTINUED | OUTPATIENT
Start: 2024-11-01 | End: 2024-10-31

## 2024-10-31 RX ORDER — IBUPROFEN 400 MG/1
400 TABLET, FILM COATED ORAL
Status: DISCONTINUED | OUTPATIENT
Start: 2024-10-31 | End: 2024-11-06 | Stop reason: HOSPADM

## 2024-10-31 RX ORDER — LANOLIN ALCOHOL/MO/W.PET/CERES
10.5 CREAM (GRAM) TOPICAL NIGHTLY
Status: DISCONTINUED | OUTPATIENT
Start: 2024-10-31 | End: 2024-11-06 | Stop reason: HOSPADM

## 2024-10-31 RX ORDER — ACETAMINOPHEN 325 MG/1
650 TABLET ORAL EVERY 4 HOURS PRN
Status: DISCONTINUED | OUTPATIENT
Start: 2024-10-31 | End: 2024-10-31

## 2024-10-31 RX ORDER — CARBIDOPA AND LEVODOPA 25; 100 MG/1; MG/1
1 TABLET ORAL 3 TIMES DAILY
Status: DISCONTINUED | OUTPATIENT
Start: 2024-10-31 | End: 2024-10-31

## 2024-10-31 RX ORDER — PAROXETINE 20 MG/1
30 TABLET, FILM COATED ORAL NIGHTLY
Status: DISCONTINUED | OUTPATIENT
Start: 2024-10-31 | End: 2024-11-06 | Stop reason: HOSPADM

## 2024-10-31 RX ORDER — ACETAMINOPHEN 325 MG/1
650 TABLET ORAL EVERY 6 HOURS PRN
Status: DISCONTINUED | OUTPATIENT
Start: 2024-10-31 | End: 2024-11-06 | Stop reason: HOSPADM

## 2024-10-31 RX ORDER — CARBIDOPA AND LEVODOPA 25; 100 MG/1; MG/1
0.5 TABLET ORAL EVERY 6 HOURS
Status: DISCONTINUED | OUTPATIENT
Start: 2024-11-01 | End: 2024-11-06 | Stop reason: HOSPADM

## 2024-10-31 RX ORDER — CARBIDOPA AND LEVODOPA 25; 100 MG/1; MG/1
0.5 TABLET ORAL EVERY 6 HOURS
Status: DISCONTINUED | OUTPATIENT
Start: 2024-11-01 | End: 2024-10-31

## 2024-10-31 RX ORDER — VITAMIN B COMPLEX
5000 TABLET ORAL DAILY
Status: DISCONTINUED | OUTPATIENT
Start: 2024-10-31 | End: 2024-11-06 | Stop reason: HOSPADM

## 2024-10-31 RX ORDER — CARBIDOPA AND LEVODOPA 25; 100 MG/1; MG/1
1 TABLET ORAL EVERY 24 HOURS
Status: DISCONTINUED | OUTPATIENT
Start: 2024-11-01 | End: 2024-11-06 | Stop reason: HOSPADM

## 2024-10-31 RX ORDER — ACETAMINOPHEN 650 MG/1
650 SUPPOSITORY RECTAL EVERY 6 HOURS PRN
Status: DISCONTINUED | OUTPATIENT
Start: 2024-10-31 | End: 2024-11-06 | Stop reason: HOSPADM

## 2024-10-31 RX ORDER — ASCORBIC ACID 500 MG
500 TABLET ORAL DAILY
COMMUNITY

## 2024-10-31 RX ORDER — CARBIDOPA AND LEVODOPA 50; 200 MG/1; MG/1
2 TABLET, EXTENDED RELEASE ORAL NIGHTLY
Status: DISCONTINUED | OUTPATIENT
Start: 2024-10-31 | End: 2024-10-31

## 2024-10-31 RX ORDER — CARBIDOPA, LEVODOPA AND ENTACAPONE 31.25; 200; 125 MG/1; MG/1; MG/1
1 TABLET, FILM COATED ORAL SEE ADMIN INSTRUCTIONS
Status: DISCONTINUED | OUTPATIENT
Start: 2024-11-01 | End: 2024-11-01

## 2024-10-31 RX ORDER — ONDANSETRON 4 MG/1
4 TABLET, ORALLY DISINTEGRATING ORAL EVERY 8 HOURS PRN
Status: DISCONTINUED | OUTPATIENT
Start: 2024-10-31 | End: 2024-11-06 | Stop reason: HOSPADM

## 2024-10-31 RX ORDER — DOCUSATE SODIUM 100 MG/1
100 CAPSULE, LIQUID FILLED ORAL 2 TIMES DAILY
Status: DISCONTINUED | OUTPATIENT
Start: 2024-10-31 | End: 2024-11-06 | Stop reason: HOSPADM

## 2024-10-31 RX ORDER — CARBIDOPA AND LEVODOPA 25; 100 MG/1; MG/1
1 TABLET ORAL SEE ADMIN INSTRUCTIONS
Status: DISCONTINUED | OUTPATIENT
Start: 2024-10-31 | End: 2024-10-31

## 2024-10-31 RX ORDER — MIRTAZAPINE 15 MG/1
45 TABLET, FILM COATED ORAL NIGHTLY
Status: DISCONTINUED | OUTPATIENT
Start: 2024-10-31 | End: 2024-11-06 | Stop reason: HOSPADM

## 2024-10-31 RX ORDER — ENOXAPARIN SODIUM 100 MG/ML
40 INJECTION SUBCUTANEOUS DAILY
Status: DISCONTINUED | OUTPATIENT
Start: 2024-10-31 | End: 2024-11-06 | Stop reason: HOSPADM

## 2024-10-31 RX ADMIN — ACETAMINOPHEN 1000 MG: 500 TABLET ORAL at 20:15

## 2024-10-31 RX ADMIN — ENOXAPARIN SODIUM 40 MG: 100 INJECTION SUBCUTANEOUS at 18:21

## 2024-10-31 RX ADMIN — CARBIDOPA AND LEVODOPA 2 TABLET: 50; 200 TABLET, EXTENDED RELEASE ORAL at 21:46

## 2024-10-31 RX ADMIN — Medication 10.5 MG: at 20:16

## 2024-10-31 RX ADMIN — PAROXETINE HYDROCHLORIDE 30 MG: 20 TABLET, FILM COATED ORAL at 21:52

## 2024-10-31 RX ADMIN — MIRTAZAPINE 45 MG: 15 TABLET, FILM COATED ORAL at 20:18

## 2024-10-31 RX ADMIN — IBUPROFEN 400 MG: 400 TABLET, FILM COATED ORAL at 18:21

## 2024-10-31 RX ADMIN — CARBIDOPA, LEVODOPA AND ENTACAPONE 1 TABLET: 31.25; 200; 125 TABLET, FILM COATED ORAL at 20:20

## 2024-10-31 RX ADMIN — CARBIDOPA, LEVODOPA AND ENTACAPONE 1 TABLET: 31.25; 200; 125 TABLET, FILM COATED ORAL at 19:01

## 2024-10-31 RX ADMIN — Medication 5000 UNITS: at 18:21

## 2024-10-31 RX ADMIN — CARBIDOPA, LEVODOPA AND ENTACAPONE 1 TABLET: 31.25; 200; 125 TABLET, FILM COATED ORAL at 21:48

## 2024-10-31 RX ADMIN — CARBIDOPA AND LEVODOPA 1 TABLET: 25; 100 TABLET ORAL at 21:46

## 2024-10-31 ASSESSMENT — PAIN DESCRIPTION - LOCATION: LOCATION: BACK

## 2024-10-31 ASSESSMENT — LIFESTYLE VARIABLES
HOW MANY STANDARD DRINKS CONTAINING ALCOHOL DO YOU HAVE ON A TYPICAL DAY: PATIENT DOES NOT DRINK
HOW OFTEN DO YOU HAVE A DRINK CONTAINING ALCOHOL: NEVER

## 2024-10-31 ASSESSMENT — PAIN SCALES - GENERAL
PAINLEVEL_OUTOF10: 0
PAINLEVEL_OUTOF10: 5
PAINLEVEL_OUTOF10: 10
PAINLEVEL_OUTOF10: 0

## 2024-10-31 NOTE — CARE COORDINATION
differences between respite, [facility], SNF, and Long-term care. CM recommended spouse/family connecting with a  to assist them with determining next steps; CM connected family with [facility]. Spouse to contact Sapna on today. Recommended to MD Hospitalist for PT/OT evaluation to determine if rehab intervention is recommended. If SNF is recommended, pt will require 3 night stay to be eligible for transition. Family shares that if SNF is not recommended and [facility] cannot be quickly located, they will likely look for respite options. CM confirmed that [facility] is able to assist family with locating respite if needed.    Care management will continue to be available to assist as transition of care planning needs arise. Full assessment below:       10/31/24 2606   Service Assessment   Patient Orientation Other (see comment)  (Pt FÁTIMA for imaging, information obtained from spouse. Pt with  advancing Parkinson's disease at baseline per spouse)   Cognition Other (see comment)  (Assessment completed with spouse)   History Provided By Spouse;Child/Family  (Spouse, daughter)   Primary Caregiver Spouse   Accompanied By/Relationship Spouse, Rianna Vang, and daughter, Zabrina Cortes   Support Systems Spouse/Significant Other;Children;Friends/Neighbors;Home Care Staff  (Pt's main caregiver is spouse; Spouse had hired private duty caregivers through [employer] to assist her, also had supportive neighbor who has recently helped when spouse was hospitalized)   Patient's Healthcare Decision Maker is: Legal Next of Kin   PCP Verified by CM Yes  (Shi Ochoa MD)   Last Visit to PCP Within last year  (3/6/24)   Prior Functional Level Assistance with the following:;Bathing;Dressing;Cooking;Housework;Shopping;Mobility   Current Functional Level Assistance with the following:;Bathing;Dressing;Toileting;Cooking;Housework;Shopping;Mobility   Can patient return to prior living arrangement No  (Family is reporting that pt          Advance Care Planning     General Advance Care Planning (ACP) Conversation    Date of Conversation: 10/31/2024  Conducted with: Legal next of kin  Other persons present: Daughter Zabrina Cortes    Healthcare Decision Maker:   Primary Decision Maker: Rianna Vang - [facility] - [phone]     Today we documented Decision Maker(s) consistent with Legal Next of Kin hierarchy.    Content/Action Overview:  Has NO ACP documents-Information provided    Length of Voluntary ACP Conversation in minutes:  <16 minutes (Non-Billable)    IVAN Whittington.  Care Manager, [facility]  [phone]/Available on [employer]

## 2024-10-31 NOTE — H&P
NEG /hpf    Urine Culture if Indicated CULTURE NOT INDICATED BY UA RESULT CNI      Hyaline Casts, UA 0-2 0 - 2 /lpf         CT Head W/O Contrast    Result Date: 10/31/2024  EXAM: CT HEAD WO CONTRAST INDICATION: Parkinsons, worsening confusion, acute pathology (has brain stimulator, can't have MRI) COMPARISON: CT head 12/25/2021. CONTRAST: None. TECHNIQUE: Unenhanced CT of the head was performed using 5 mm images. Brain and bone windows were generated. Coronal and sagittal reformats. CT dose reduction was achieved through use of a standardized protocol tailored for this examination and automatic exposure control for dose modulation.  FINDINGS: Deep brain stimulator. Generalized volume loss. White matter hypodensities may reflect chronic microangiopathic change. There is no intracranial hemorrhage, extra-axial collection, or mass effect. The basilar cisterns are open. No CT evidence of acute infarct. The bone windows demonstrate no abnormalities. The visualized portions of the paranasal sinuses are grossly clear. Chronic right mastoid effusion. Right lens replacement.     No acute abnormality. Deep brain stimulator and other nonacute findings as above. Electronically signed by RAISSA MAY     _______________________________________________________________________    TOTAL TIME:  76 Minutes    Critical Care Provided     Minutes non procedure based    Signed: Sheila Solano MD    Procedures: see electronic medical records for all procedures/Xrays and details which were not copied into this note but were reviewed prior to creation of Plan.

## 2024-10-31 NOTE — PROGRESS NOTES
End of Shift Note    Bedside shift change report given to  KATYA Ochoa  (oncoming nurse) by Valerie Oates RN .        Shift worked:  Days   Shift summary and any significant changes:    Patient admitted and oriented to unit   Added to CVA pathway  Passed swallow screening   Labs drawn and sent   Admission database complete    Concerns for physician to address:  Days   Zone phone for oncoming shift:  [phone]     Patient Information  Fan Vang  73 y.o.  10/31/2024 10:55 AM by Sheila Solano MD. Fan Vang was admitted from [facility]    Problem List  Patient Active Problem List    Diagnosis Date Noted    Confusion and disorientation 10/31/2024    Parkinson's disease (HCC)     Arthritis     S/P deep brain stimulator placement     Gout     Thyroid cancer (HCC)      Past Medical History:   Diagnosis Date    Arthritis     back and left knee    Dependence on walking stick     bilateral/single hiking stick if walking distance    Gout     Parkinson's disease     S/P deep brain stimulator placement     pt has a remote for this device    Thyroid cancer (HCC)     radioactive iodine 4/2020, and partial thyroidectomy    Uses brace     back brace       Core Measures:  CVA: yes  CHF: no  PNA: no    Activity:     Number times ambulated in hallways past shift: 0  Number of times OOB to chair past shift: 0    Cardiac:   Cardiac Monitoring: yes    Access:   Current line(s): PIV    Respiratory:        GI:  Last BM (including prior to admit): 10/31/24  Current diet:  DIET ONE TIME MESSAGE;  Tolerating current diet: Yes    Pain Management:   Patient states pain is manageable on current regimen: yes    Skin:  Jude Scale Score: 17  Interventions: dual skin   Pressure injury: no    Patient Safety:  Fall Score: Flores Total Score: 90  Interventions: stay with me program  Self-release roll belt: No  Dexterity to release roll belt: N/A   (must document dexterity  here by stating Yes or No here, otherwise this is a restraint and must follow

## 2024-10-31 NOTE — ED PROVIDER NOTES
[facility] EMERGENCY DEPT  EMERGENCY DEPARTMENT ENCOUNTER    Patient Name: Fan Vang  MRN: [id number]  YOB: 1950  Provider: Dewey Head MD  PCP: Shi Ochoa MD  Neurology: Junior Grimm at [facility]  Time/Date of evaluation: 11:56 AM EDT on 10/31/24    History of Presenting Illness     Chief Complaint   Patient presents with    Altered Mental Status     Pt with history of Parkinsons, family reports over the last several weeks he has had increased confusion, frustration and back pain     History Provided by: Patient, Patient's Wife, and Patient's Daughter   History is limited by: Confusion    HISTORY (Narrative):   Fan Vang is a 73 y.o. male with a PMHX of osteoarthritis, Parkinson's disease, thyroid cancer, chronic back pain, retinal detachment status post retinopathy  who presents to the emergency department (room 35) by POV C/O worsening confusion over the past few weeks with difficulty finding the correct words.  His wife also reports he has had worsening back pain and now states he is no longer safe in their home.  She denies any recent falls and states that he crawls on the floor wherever he goes due to his lack of depth perception secondary to his retinopathy.    Nursing Notes were all reviewed and agreed with or any disagreements were addressed in the HPI.    Past History     PAST MEDICAL HISTORY:  Past Medical History:   Diagnosis Date    Arthritis     back and left knee    Dependence on walking stick     bilateral/single hiking stick if walking distance    Gout     Parkinson's disease     S/P deep brain stimulator placement     pt has a remote for this device    Thyroid cancer (HCC)     radioactive iodine 4/2020, and partial thyroidectomy    Uses brace     back brace       PAST SURGICAL HISTORY:  Past Surgical History:   Procedure Laterality Date    CATARACT EXTRACTION W/ INTRAOCULAR LENS IMPLANT Right     NASAL TURBINATE REDUCTION Bilateral     NEUROLOGICAL SURGERY      deep brain  Records, and Clinical Records from a Different Specialty (neurology outpatient note dated 9/20/2024)    ED Course as of 10/31/24 1405   Thu Oct 31, 2024   1256 Case management has seen and evaluated the patient.  She will look into outpatient resources if he does not meet any admission criteria. [MS]   1322 Patient's workup is unremarkable.  Given his inability to take care of himself and the fact that his wife is not able to manage him in his current state or keep him safe at home, will discuss with the hospitalist for admission. [MS]   1403 Dewey Head MD spoke with hospitalist, Consult for Medicine. Discussed HPI and PE, available diagnostic tests and clinical findings. He is in agreement with care plans as outlined.  He agrees to admit the patient. [MS]      ED Course User Index  [MS] Dewey Head MD     SEPSIS:  Is this patient to be included in the SEP-1 core measure? No Exclusion criteria - the patient is NOT to be included for SEP-1 Core Measure due to: Infection is not suspected    Clinical Management Tools:  Not Applicable    Patient was given the following medications:    Medications - No data to display  CONSULTS:    IP CONSULT TO CASE MANAGEMENT  IP CONSULT TO HOSPITALIST    Social Determinants affecting Diagnosis/Treatment: None    DISCUSSION:  This appears to be a severe acute worsening of a chronic condition.   73 y.o. male presents with family with worsening confusion, agitation, word finding issues, and inability to remain safe in his home.  Patient has a history of Parkinson's and family has been a significant decline over the past 2 to 3 weeks.  Will check basic labs including CBC, CMP, UA, and CT head to assess for acute causes of his encephalopathy.  Discussed with patient and family that this could be his new state.  I do believe patient is depressed and would benefit from a psych consult.  Workup unremarkable in the ED.  Will discuss with the hospitalist for admission for

## 2024-11-01 PROBLEM — G93.40 ENCEPHALOPATHY ACUTE: Status: ACTIVE | Noted: 2024-11-01

## 2024-11-01 PROBLEM — F32.A DEPRESSION: Status: ACTIVE | Noted: 2024-11-01

## 2024-11-01 PROBLEM — M51.362 DEGENERATION OF INTERVERTEBRAL DISC OF LUMBAR REGION WITH DISCOGENIC BACK PAIN AND LOWER EXTREMITY PAIN: Status: ACTIVE | Noted: 2024-11-01

## 2024-11-01 LAB
ANION GAP SERPL CALC-SCNC: 3 MMOL/L (ref 2–12)
BASOPHILS # BLD: 0.1 K/UL (ref 0–0.1)
BASOPHILS NFR BLD: 2 % (ref 0–1)
BUN SERPL-MCNC: 11 MG/DL (ref 6–20)
BUN/CREAT SERPL: 17 (ref 12–20)
CALCIUM SERPL-MCNC: 9 MG/DL (ref 8.5–10.1)
CHLORIDE SERPL-SCNC: 106 MMOL/L (ref 97–108)
CO2 SERPL-SCNC: 30 MMOL/L (ref 21–32)
CREAT SERPL-MCNC: 0.65 MG/DL (ref 0.7–1.3)
DIFFERENTIAL METHOD BLD: ABNORMAL
EOSINOPHIL # BLD: 0.3 K/UL (ref 0–0.4)
EOSINOPHIL NFR BLD: 6 % (ref 0–7)
ERYTHROCYTE [DISTWIDTH] IN BLOOD BY AUTOMATED COUNT: 13 % (ref 11.5–14.5)
GLUCOSE SERPL-MCNC: 92 MG/DL (ref 65–100)
HCT VFR BLD AUTO: 37.1 % (ref 36.6–50.3)
HGB BLD-MCNC: 12.7 G/DL (ref 12.1–17)
IMM GRANULOCYTES # BLD AUTO: 0 K/UL (ref 0–0.04)
IMM GRANULOCYTES NFR BLD AUTO: 0 % (ref 0–0.5)
LYMPHOCYTES # BLD: 1.1 K/UL (ref 0.8–3.5)
LYMPHOCYTES NFR BLD: 20 % (ref 12–49)
MCH RBC QN AUTO: 32.5 PG (ref 26–34)
MCHC RBC AUTO-ENTMCNC: 34.2 G/DL (ref 30–36.5)
MCV RBC AUTO: 94.9 FL (ref 80–99)
MONOCYTES # BLD: 0.4 K/UL (ref 0–1)
MONOCYTES NFR BLD: 7 % (ref 5–13)
NEUTS SEG # BLD: 3.5 K/UL (ref 1.8–8)
NEUTS SEG NFR BLD: 65 % (ref 32–75)
NRBC # BLD: 0 K/UL (ref 0–0.01)
NRBC BLD-RTO: 0 PER 100 WBC
PLATELET # BLD AUTO: 278 K/UL (ref 150–400)
PMV BLD AUTO: 9.6 FL (ref 8.9–12.9)
POTASSIUM SERPL-SCNC: 2.9 MMOL/L (ref 3.5–5.1)
RBC # BLD AUTO: 3.91 M/UL (ref 4.1–5.7)
SODIUM SERPL-SCNC: 139 MMOL/L (ref 136–145)
WBC # BLD AUTO: 5.4 K/UL (ref 4.1–11.1)

## 2024-11-01 PROCEDURE — 97535 SELF CARE MNGMENT TRAINING: CPT

## 2024-11-01 PROCEDURE — 99223 1ST HOSP IP/OBS HIGH 75: CPT | Performed by: PSYCHIATRY & NEUROLOGY

## 2024-11-01 PROCEDURE — 97530 THERAPEUTIC ACTIVITIES: CPT

## 2024-11-01 PROCEDURE — 1100000003 HC PRIVATE W/ TELEMETRY

## 2024-11-01 PROCEDURE — 97166 OT EVAL MOD COMPLEX 45 MIN: CPT

## 2024-11-01 PROCEDURE — 97162 PT EVAL MOD COMPLEX 30 MIN: CPT

## 2024-11-01 PROCEDURE — 80048 BASIC METABOLIC PNL TOTAL CA: CPT

## 2024-11-01 PROCEDURE — 6370000000 HC RX 637 (ALT 250 FOR IP): Performed by: INTERNAL MEDICINE

## 2024-11-01 PROCEDURE — 85025 COMPLETE CBC W/AUTO DIFF WBC: CPT

## 2024-11-01 PROCEDURE — 6360000002 HC RX W HCPCS: Performed by: INTERNAL MEDICINE

## 2024-11-01 PROCEDURE — 36415 COLL VENOUS BLD VENIPUNCTURE: CPT

## 2024-11-01 PROCEDURE — 6370000000 HC RX 637 (ALT 250 FOR IP): Performed by: STUDENT IN AN ORGANIZED HEALTH CARE EDUCATION/TRAINING PROGRAM

## 2024-11-01 RX ORDER — LEVOTHYROXINE SODIUM 25 UG/1
25 TABLET ORAL DAILY
Status: DISCONTINUED | OUTPATIENT
Start: 2024-11-01 | End: 2024-11-04

## 2024-11-01 RX ORDER — LEVOTHYROXINE SODIUM 25 UG/1
25 TABLET ORAL 2 TIMES DAILY
COMMUNITY

## 2024-11-01 RX ORDER — POTASSIUM CHLORIDE 1.5 G/1.58G
40 POWDER, FOR SOLUTION ORAL
Status: COMPLETED | OUTPATIENT
Start: 2024-11-01 | End: 2024-11-01

## 2024-11-01 RX ORDER — CASTOR OIL AND BALSAM, PERU 788; 87 MG/G; MG/G
OINTMENT TOPICAL 2 TIMES DAILY
Status: DISCONTINUED | OUTPATIENT
Start: 2024-11-01 | End: 2024-11-06 | Stop reason: HOSPADM

## 2024-11-01 RX ORDER — LIOTHYRONINE SODIUM 5 UG/1
10 TABLET ORAL 2 TIMES DAILY
COMMUNITY

## 2024-11-01 RX ORDER — CARBIDOPA, LEVODOPA AND ENTACAPONE 31.25; 200; 125 MG/1; MG/1; MG/1
1 TABLET, FILM COATED ORAL
Status: DISCONTINUED | OUTPATIENT
Start: 2024-11-01 | End: 2024-11-06 | Stop reason: HOSPADM

## 2024-11-01 RX ORDER — OLANZAPINE 5 MG/1
2.5 TABLET ORAL DAILY
Status: DISCONTINUED | OUTPATIENT
Start: 2024-11-01 | End: 2024-11-04

## 2024-11-01 RX ADMIN — POTASSIUM CHLORIDE 40 MEQ: 1.5 FOR SOLUTION ORAL at 13:13

## 2024-11-01 RX ADMIN — Medication: at 08:24

## 2024-11-01 RX ADMIN — ACETAMINOPHEN 1000 MG: 500 TABLET ORAL at 08:23

## 2024-11-01 RX ADMIN — IBUPROFEN 400 MG: 400 TABLET, FILM COATED ORAL at 12:35

## 2024-11-01 RX ADMIN — IBUPROFEN 400 MG: 400 TABLET, FILM COATED ORAL at 08:22

## 2024-11-01 RX ADMIN — CARBIDOPA AND LEVODOPA 0.5 TABLET: 25; 100 TABLET ORAL at 08:23

## 2024-11-01 RX ADMIN — LEVOTHYROXINE SODIUM 25 MCG: 0.03 TABLET ORAL at 10:07

## 2024-11-01 RX ADMIN — CARBIDOPA, LEVODOPA AND ENTACAPONE 1 TABLET: 31.25; 200; 125 TABLET, FILM COATED ORAL at 16:12

## 2024-11-01 RX ADMIN — CARBIDOPA, LEVODOPA AND ENTACAPONE 1 TABLET: 31.25; 200; 125 TABLET, FILM COATED ORAL at 17:37

## 2024-11-01 RX ADMIN — OLANZAPINE 2.5 MG: 5 TABLET, FILM COATED ORAL at 16:10

## 2024-11-01 RX ADMIN — POTASSIUM CHLORIDE 40 MEQ: 1.5 FOR SOLUTION ORAL at 16:10

## 2024-11-01 RX ADMIN — CARBIDOPA, LEVODOPA AND ENTACAPONE 1 TABLET: 31.25; 200; 125 TABLET, FILM COATED ORAL at 08:22

## 2024-11-01 RX ADMIN — CARBIDOPA AND LEVODOPA 1 TABLET: 50; 200 TABLET, EXTENDED RELEASE ORAL at 05:28

## 2024-11-01 RX ADMIN — PAROXETINE HYDROCHLORIDE 30 MG: 20 TABLET, FILM COATED ORAL at 20:19

## 2024-11-01 RX ADMIN — CARBIDOPA, LEVODOPA AND ENTACAPONE 1 TABLET: 31.25; 200; 125 TABLET, FILM COATED ORAL at 10:08

## 2024-11-01 RX ADMIN — ACETAMINOPHEN 1000 MG: 500 TABLET ORAL at 14:25

## 2024-11-01 RX ADMIN — POTASSIUM CHLORIDE 40 MEQ: 1.5 FOR SOLUTION ORAL at 17:36

## 2024-11-01 RX ADMIN — Medication: at 20:21

## 2024-11-01 RX ADMIN — CARBIDOPA AND LEVODOPA 1 TABLET: 50; 200 TABLET, EXTENDED RELEASE ORAL at 21:30

## 2024-11-01 RX ADMIN — DOCUSATE SODIUM 100 MG: 100 CAPSULE, LIQUID FILLED ORAL at 08:23

## 2024-11-01 RX ADMIN — CARBIDOPA, LEVODOPA AND ENTACAPONE 1 TABLET: 31.25; 200; 125 TABLET, FILM COATED ORAL at 20:18

## 2024-11-01 RX ADMIN — Medication 10.5 MG: at 20:14

## 2024-11-01 RX ADMIN — CARBIDOPA, LEVODOPA AND ENTACAPONE 1 TABLET: 31.25; 200; 125 TABLET, FILM COATED ORAL at 12:35

## 2024-11-01 RX ADMIN — Medication 5000 UNITS: at 08:23

## 2024-11-01 RX ADMIN — HYDRALAZINE HYDROCHLORIDE 10 MG: 20 INJECTION, SOLUTION INTRAMUSCULAR; INTRAVENOUS at 11:34

## 2024-11-01 RX ADMIN — CARBIDOPA AND LEVODOPA 0.5 TABLET: 25; 100 TABLET ORAL at 14:25

## 2024-11-01 RX ADMIN — CARBIDOPA AND LEVODOPA 0.5 TABLET: 25; 100 TABLET ORAL at 20:14

## 2024-11-01 RX ADMIN — IBUPROFEN 400 MG: 400 TABLET, FILM COATED ORAL at 16:09

## 2024-11-01 RX ADMIN — CARBIDOPA AND LEVODOPA 1 TABLET: 25; 100 TABLET ORAL at 16:10

## 2024-11-01 RX ADMIN — ENOXAPARIN SODIUM 40 MG: 100 INJECTION SUBCUTANEOUS at 08:23

## 2024-11-01 RX ADMIN — CARBIDOPA, LEVODOPA AND ENTACAPONE 1 TABLET: 31.25; 200; 125 TABLET, FILM COATED ORAL at 14:25

## 2024-11-01 RX ADMIN — MIRTAZAPINE 45 MG: 15 TABLET, FILM COATED ORAL at 20:14

## 2024-11-01 RX ADMIN — ACETAMINOPHEN 1000 MG: 500 TABLET ORAL at 20:15

## 2024-11-01 ASSESSMENT — PAIN DESCRIPTION - ORIENTATION
ORIENTATION: MID

## 2024-11-01 ASSESSMENT — PAIN SCALES - GENERAL
PAINLEVEL_OUTOF10: 4
PAINLEVEL_OUTOF10: 8
PAINLEVEL_OUTOF10: 0
PAINLEVEL_OUTOF10: 4

## 2024-11-01 ASSESSMENT — PAIN DESCRIPTION - DESCRIPTORS
DESCRIPTORS: ACHING

## 2024-11-01 ASSESSMENT — PAIN DESCRIPTION - LOCATION
LOCATION: BACK

## 2024-11-01 NOTE — CARE COORDINATION
Transition of Care Plan:    RUR: 12% Low RUR  Prior Level of Functioning: Needing assistance  Disposition: Home vs SNF vs [facility] - Pending therapy recs  MILES: 11/3/24  If SNF or IPR: Date FOC offered:   Date FOC received:   Accepting facility:   Date authorization started with reference number:   Date authorization received and expires:   Follow up appointments: Pending dc disposition  DME needed: None at this time  Transportation at discharge: Family vs stretcher  IM/IMM Medicare/ letter given: 10/31/24  Is patient a  and connected with [facility]? No   If yes, was  transfer form completed and [facility] notified? N/A  Caregiver Contact: Spouse - Rianna Vang  Discharge Caregiver contacted prior to discharge? CM to contact prior to discharge  Care Conference needed? Not at this time  Barriers to discharge: Medical clearance,     1428 - CM spoke with patient's spouse regarding discharge planning. CM advised spouse that PT/OT therapy teams will see the patient this weekend for eval and recs for discharge. CM called [facility] and [facility] to see if spouse could do a tour over the weekend. CM spoke with patient's spouse to give [facility] and [facility]'s contact information to possibly coordinate a tour of facility. CM left voicemail for [facility] regarding tour information.     CM provided weekend CM number to spouse via email as requested by spouse.     0923 - Chart reviewed. CM assumed transition of care from [facility]. Patient's spouse reported to ED CM that she needs assistance with possible [facility] placement. ED CM requested for PT/OT eval for discharge recommendations. PT/OT orders have not been placed at this time, will discuss in IDR today.    CM to follow for discharge needs.     Amber Buchanan, Care Management

## 2024-11-01 NOTE — PROGRESS NOTES
End of Shift Note    Bedside shift change report given to  KATYA Ochoa  (oncoming nurse) by Valerie Oates RN .        Shift worked:  Days   Shift summary and any significant changes:    PO potassium replacement given  PT/OT worked with patient   Scheduled Zyprexa added to daily medications   Roll belt on patient. Patient demonstrated dexterity to remove belt.   PRN Hydralazine    Concerns for physician to address:  Days   Zone phone for oncoming shift:  [phone]     Patient Information  Fan Vang  73 y.o.  10/31/2024 10:55 AM by Sheila Solano MD. Fan Vang was admitted from [facility]    Problem List  Patient Active Problem List    Diagnosis Date Noted    Encephalopathy acute 11/01/2024    Depression 11/01/2024    Degeneration of intervertebral disc of lumbar region with discogenic back pain and lower extremity pain 11/01/2024    Confusion and disorientation 10/31/2024    Parkinson's disease (HCC)     Arthritis     S/P deep brain stimulator placement     Gout     Thyroid cancer (HCC)      Past Medical History:   Diagnosis Date    Arthritis     back and left knee    Dependence on walking stick     bilateral/single hiking stick if walking distance    Gout     Parkinson's disease     S/P deep brain stimulator placement     pt has a remote for this device    Thyroid cancer (HCC)     radioactive iodine 4/2020, and partial thyroidectomy    Uses brace     back brace       Core Measures:  CVA: yes  CHF: no  PNA: no    Activity:     Number times ambulated in hallways past shift: 0  Number of times OOB to chair past shift: 0    Cardiac:   Cardiac Monitoring: yes    Access:   Current line(s): PIV    Respiratory:   O2 Device: None (Room air)    GI:  Last BM (including prior to admit): 10/31/24  Current diet:  DIET ONE TIME MESSAGE;  ADULT DIET; Regular  DIET ONE TIME MESSAGE;  Tolerating current diet: Yes    Pain Management:   Patient states pain is manageable on current regimen: yes    Skin:  Jude Scale Score:

## 2024-11-01 NOTE — PROGRESS NOTES
meds    Active Consults:  IP CONSULT TO CASE MANAGEMENT  IP CONSULT TO HOSPITALIST  IP CONSULT TO PHARMACY  IP CONSULT TO NEUROLOGY    Length of Stay:  Expected LOS: 3  Actual LOS: 1    Gabriela Azevedo RN

## 2024-11-01 NOTE — PLAN OF CARE
Problem: Occupational Therapy - Adult  Goal: By Discharge: Performs self-care activities at highest level of function for planned discharge setting.  See evaluation for individualized goals.  Description: FUNCTIONAL STATUS PRIOR TO ADMISSION:  Patient questionable historian but reported he was independent in ADLs and functional mobility. Per chart review, CM noted reported patient was crab walking around his house and required assist from wife for ADLs.   Receives Help From: Family, Neighbor, Personal care attendant, ADL Assistance: Needs assistance,  ,  ,  ,  ,  , Homemaking Assistance: Needs assistance, Ambulation Assistance: Needs assistance, Transfer Assistance: Independent, Active : No     HOME SUPPORT: Patient lived with wife.    Occupational Therapy Goals:  Initiated 11/1/2024  1.  Patient will perform grooming with Supervision within 7 day(s).  2.  Patient will perform upper body dressing with Supervision within 7 day(s).  3.  Patient will perform lower body dressing with Supervision within 7 day(s).  4.  Patient will perform toilet transfers to [facility] with Minimal Assist  within 7 day(s).  5.  Patient will perform all aspects of toileting with Minimal Assist within 7 day(s).  6.  Patient will participate in upper extremity therapeutic exercise/activities with Supervision for 5 minutes within 7 day(s).    Outcome: Not Progressing   OCCUPATIONAL THERAPY EVALUATION    Patient: Fan Vang (73 y.o. male)  Date: 11/1/2024  Primary Diagnosis: Essential hypertension [I10]  Encephalopathy acute [G93.40]  Confusion and disorientation [R41.0]  Depression, unspecified depression type [F32.A]  Parkinson's disease with dyskinesia, unspecified whether manifestations fluctuate (HCC) [G20.B1]         Precautions:                    ASSESSMENT :  The patient is limited by decreased functional mobility, independence in ADLs, safety awareness, cognition, command following, attention/concentration.    Based on the

## 2024-11-01 NOTE — PROGRESS NOTES
Pharmacy Medication Reconciliation     The patient was interviewed regarding current PTA medication list, use and drug allergies;  patient present in room and obtained permission from patient to discuss drug regimen with visitor(s) present. The patient was questioned regarding use of any other inhalers, topical products, over the counter medications, herbal medications, vitamin products or ophthalmic/nasal/otic medication use.     Allergy Update: Celecoxib, Quetiapine, and Trazodone    Recommendations/Findings:   The following amendments were made to the patient's active medication list on file at [facility]:   1) Additions: liothyroinine, levothyroxine    2) Deletions: None    3) Changes: miralax scheduled      Pertinent Findings: See above    Clarified PTA med list with patient's wife. PTA medication list was corrected to the following:     Prior to Admission Medications   Prescriptions Last Dose Informant   Glucosamine-Chondroitin 250-200 MG TABS 10/31/2024    Sig: Take 4 tablets by mouth daily 2 in morning 2 in evening   Levodopa (INBRIJA) 42 MG CAPS 10/31/2024    Sig: Inhale 84 mg into the lungs as needed 2 tablets   PARoxetine (PAXIL) 30 MG tablet 10/31/2024    Sig: Take 1 tablet by mouth nightly   VITAMIN B COMPLEX-C PO 10/31/2024    acetaminophen (TYLENOL) 500 MG tablet 10/31/2024    Sig: Take 2 tablets by mouth 3 times daily   carbidopa-levodopa (SINEMET CR)  MG per extended release tablet 10/31/2024    Sig: Take 3 tablets by mouth nightly 10 pm, 2am, 5 am   carbidopa-levodopa (SINEMET)  MG per tablet 10/31/2024    Sig: Take 1 tablet by mouth 3 times daily Half pill 8am, 2pm, 10pm, 2am. Whole pill at 4pm   carbidopa-levodopa-entacapone (STALEVO 125) 31.-200 MG TABS 10/31/2024    Sig: Take 1 tablet by mouth daily 8am, 10am, 12pm, 2pm, 4pm, 6pm, 8pm   diclofenac (VOLTAREN) 75 MG EC tablet 10/31/2024    Sig: Take 1 tablet by mouth 2 times daily   docusate sodium (COLACE) 100 MG capsule 10/31/2024

## 2024-11-01 NOTE — PROGRESS NOTES
Hospitalist Progress Note    NAME:   Fan Vang   : 1950   MRN: [id number]     Date/Time: 2024 9:39 AM  Patient PCP: Shi Ocoha MD    Estimated discharge date: 24   Barriers:     Placement, memory care    Assessment / Plan:    Confusional state and expressive aphasia  Severe Parkinson, progressive Parkinson disease  Status post deep brain stimulator in place  Aggressive degenerative spine disease     Will continue to monitor to neurofloor, patient has had on and off confusion and expressive aphasia for few weeks  CT of the head negative, cannot do MRI due to brain stimulator  Normal  B12 folic acid ammonia and TSH  Consult neurology,  defer the need for EEG neurology  Resume Parkinson meds  Patient seen by the [facility] neurology  Physical therapy and Occupational Therapy recommended: Memory care  Wife is unable to take care of him, patient will speak to his wife, and will await their decision tomorrow    #Hypokalemia  Potassium 2.9  We repleted with KCl 120 mEq         Thyroid cancer s/p radiation  Hypothyroidism  Not on any meds  TSH 11.90, free T40.5  --Will start with 25 mcg levothyroxine, based on the tolerability will increase the dose           Insomnia and depression  Continue with Paxil, melatonin     Chronic back pain  Takes Advil          Medical Decision Making:   I personally reviewed labs:  I personally reviewed imaging:  I personally reviewed EKG:  Toxic drug monitoring:   Discussed case with:         Code Status: Full code  DVT Prophylaxis: Lovenox  GI Prophylaxis: Famotidine        Objective:     VITALS:   Last 24hrs VS reviewed since prior progress note. Most recent are:  Patient Vitals for the past 24 hrs:   BP Temp Temp src Pulse Resp SpO2 Height Weight   24 0813 (!) 180/96 98.1 °F (36.7 °C) -- 65 20 94 % -- --   24 0512 (!) 180/87 97.3 °F (36.3 °C) Oral 60 17 97 % -- --   24 0247 (!) 140/87 97.5 °F (36.4 °C) -- 66 18 97 % -- --   10/31/24 2314 (!)  which were not copied into this note but were reviewed prior to creation of Plan.      LABS:  I reviewed today's most current labs and imaging studies.  Pertinent labs include:  Recent Labs     10/31/24  1211 11/01/24  0600   WBC 4.7 5.4   HGB 11.8* 12.7   HCT 34.4* 37.1    278     Recent Labs     10/31/24  1211 11/01/24  0600    139   K 3.5 2.9*   * 106   CO2 27 30   GLUCOSE 107* 92   BUN 16 11   CREATININE 0.64* 0.65*   CALCIUM 8.5 9.0   MG 2.2  --    BILITOT 1.2*  --    AST 27  --    ALT <6*  --        Signed: Irlanda Pablo MD

## 2024-11-01 NOTE — PLAN OF CARE
Problem: Physical Therapy - Adult  Goal: By Discharge: Performs mobility at highest level of function for planned discharge setting.  See evaluation for individualized goals.  Description: FUNCTIONAL STATUS PRIOR TO ADMISSION: Pt is a poor historian but reports he mainly uses wheelchair.    HOME SUPPORT PRIOR TO ADMISSION: The patient lived with wife.    Physical Therapy Goals  Initiated 11/1/2024  1.  Patient will move from supine to sit and sit to supine in bed with supervision/set-up within 7 day(s).    2.  Patient will perform sit to stand with supervision/set-up within 7 day(s).  3.  Patient will transfer from bed to chair and chair to bed with supervision/set-up using the least restrictive device within 7 day(s).      Outcome: Progressing   PHYSICAL THERAPY EVALUATION    Patient: Fan Vang (73 y.o. male)  Date: 11/1/2024  Primary Diagnosis: Essential hypertension [I10]  Encephalopathy acute [G93.40]  Confusion and disorientation [R41.0]  Depression, unspecified depression type [F32.A]  Parkinson's disease with dyskinesia, unspecified whether manifestations fluctuate (HCC) [G20.B1]       Precautions: Restrictions/Precautions: Fall Risk, Bed Alarm                      ASSESSMENT :   DEFICITS/IMPAIRMENTS:   The patient is limited by decreased cognition, balance     Based on the impairments listed above pt is close to functional baseline. Pt's cognition and safety awareness is his biggest issue. He was received in supine in roll belt and cleared by nursing to mobilize. He was able to mobilize to the EOB and stand multiple times. He needed constant redirection for safety. Returned to supine and roll belt replaced.     Patient will benefit from skilled intervention to address the above impairments.    Functional Outcome Measure:  The patient scored 20/24 on the [facility] outcome measure which is indicative of good functional mobility.           PLAN :  Recommendations and Planned Interventions:   bed mobility    Balance  Sitting: Impaired  Sitting - Static: Good (unsupported)  Sitting - Dynamic: Fair (occasional)  Standing: Impaired  Standing - Static: Fair  Standing - Dynamic: Fair;Poor                                                                                                                                                                                                                                            [facility] AM-PAC®      Basic Mobility Inpatient Short Form (6-Clicks) Version 2  How much HELP from another person do you currently need... (If the patient hasn't done an activity recently, how much help from another person do you think they would need if they tried?) Total A Lot A Little None   1.  Turning from your back to your side while in a flat bed without using bedrails? []  1 []  2 []  3  [x]  4   2.  Moving from lying on your back to sitting on the side of a flat bed without using bedrails? []  1 []  2 []  3  [x]  4   3.  Moving to and from a bed to a chair (including a wheelchair)? []  1 []  2 [x]  3  []  4   4. Standing up from a chair using your arms (e.g. wheelchair or bedside chair)? []  1 []  2 []  3  [x]  4   5.  Walking in hospital room? []  1 []  2 [x]  3  []  4   6.  Climbing 3-5 steps with a railing? []  1 [x]  2 []  3  []  4     Raw Score: 20/24                            Cutoff score <=171,2,3 had higher odds of discharging home with home health or need of SNF/IPR.    1. Loli Orourke, Trinity Pop, Jackson Lozano, Anika Dawson, Mitchell Jett, Geovani Orourke.  Validity of the AM-PAC “6-Clicks” Inpatient Daily Activity and Basic Mobility Short Forms. Physical Therapy Mar 2014, [phone]) 379-391; DOI: 10.2522/ptj.74917179  2. Guzman JORDAN, Joan CARVAJAL, Marija CARVAJAL, Mackenzie CARVAJAL. Association of AM-PAC \"6-Clicks\" Basic Mobility and Daily Activity Scores With Discharge Destination. Phys Ther. 2021 Apr 4;101(4):wojp826. doi: 10.1093/ptj/sugk607. PMID: [id number].  3. Mandi CARVAJAL,

## 2024-11-01 NOTE — CONSULTS
Neurology Note    Patient ID:  Fan Vang  [id number]  73 y.o.  1950      Date of Consultation:  November 1, 2024    Referring Physician: Dr. Solano    Reason for Consultation:  parkinson's disease      Assessment and Plan:    The patient is a 73-year-old male with longstanding history of Parkinson's disease, severe degenerative spine disease who comes in to the hospital with worsening overall functioning.  Decreased mobility, increasing agitation and frustration.  His examination does reveal decreased attention and concentration.  Mild generalized weakness.  Pain limiting his ability sustain a contraction.    Progressive decline in overall function:  Head CT revealed no acute abnormality  This is most likely multifactorial in etiology: Progressive Parkinson's disease, metabolic derangements, aggressive degenerative spine disease, worsening vision, depression/anxiety  Correct metabolic derangements  Evaluate for any underlying systemic infection    Parkinson disease:  Progressively worsening with development of behavioral features  Continue current doses of carbidopa/levodopa and Stalevo which he has been taking at home  He follows closely with [facility] neurology.  His Parkinson's disease from a motor standpoint seems to be stable  Continue melatonin at bedtime  Ensure to implement nonpharmacological measures to minimize the risk of hospital acquired delirium    Severe lumbar degenerative spine disease  He is followed in the outpatient setting with [facility]  Continue with pain control  Depression:  Continue home medication  Hypothyroidism:      Home situation:  I spoke at length with the patient's wife today.  Due to the patient's progressive decline, she is unable to care for him safely.  She is interested in a safe place for her  to go  I did speak with care coordination who will discuss further with the patient about different options      There is no other additional neurology recommendations at  Use    Smoking status: Former    Smokeless tobacco: Never   Substance Use Topics    Alcohol use: Yes     Alcohol/week: 7.0 standard drinks of alcohol     Comment: ocsaionally        Allergies   Allergen Reactions    Celecoxib Other (See Comments)     Mental status changes    Quetiapine      Other reaction(s): Unknown (comments)  Difficult breathing    Trazodone      Other reaction(s): Unknown (comments)  Difficult breathing        Prior to Admission medications    Medication Sig Start Date End Date Taking? Authorizing Provider   vitamin C (ASCORBIC ACID) 500 MG tablet Take 1 tablet by mouth daily   Yes Cliff Ham MD   carbidopa-levodopa (SINEMET)  MG per tablet Take 1 tablet by mouth 3 times daily Half pill 8am, 2pm, 10pm, 2am. Whole pill at 4pm   Yes Cliff Ham MD   docusate sodium (COLACE) 100 MG capsule Take 1 capsule by mouth 2 times daily   Yes Cliff Ham MD   VITAMIN B COMPLEX-C PO    Yes Cliff Ham MD   melatonin 10 MG CAPS capsule Take 1 tablet by mouth 9/17/21  Yes Cliff Ham MD   acetaminophen (TYLENOL) 500 MG tablet Take 2 tablets by mouth 3 times daily   Yes Automatic Reconciliation, Ar   carbidopa-levodopa (SINEMET CR)  MG per extended release tablet Take 3 tablets by mouth nightly 10 pm, 2am, 5 am 7/9/21  Yes Automatic Reconciliation, Ar   carbidopa-levodopa-entacapone (STALEVO 125) 31.-200 MG TABS Take 1 tablet by mouth daily 8am, 10am, 12pm, 2pm, 4pm, 6pm, 8pm 3/22/21  Yes Automatic Reconciliation, Ar   vitamin D3 (CHOLECALCIFEROL) 125 MCG (5000 UT) TABS tablet Take 1 tablet by mouth daily   Yes Automatic Reconciliation, Ar   diclofenac (VOLTAREN) 75 MG EC tablet Take 1 tablet by mouth 2 times daily   Yes Automatic Reconciliation, Ar   Glucosamine-Chondroitin 250-200 MG TABS Take 4 tablets by mouth daily 2 in morning 2 in evening 9/9/21  Yes Automatic Reconciliation, Ar   Levodopa (INBRIJA) 42 MG CAPS Inhale 84 mg into the lungs

## 2024-11-02 LAB
ALBUMIN SERPL-MCNC: 3.8 G/DL (ref 3.5–5)
ANION GAP SERPL CALC-SCNC: 2 MMOL/L (ref 2–12)
BUN SERPL-MCNC: 14 MG/DL (ref 6–20)
BUN/CREAT SERPL: 20 (ref 12–20)
CALCIUM SERPL-MCNC: 9.6 MG/DL (ref 8.5–10.1)
CHLORIDE SERPL-SCNC: 111 MMOL/L (ref 97–108)
CO2 SERPL-SCNC: 26 MMOL/L (ref 21–32)
CREAT SERPL-MCNC: 0.7 MG/DL (ref 0.7–1.3)
ERYTHROCYTE [DISTWIDTH] IN BLOOD BY AUTOMATED COUNT: 13 % (ref 11.5–14.5)
GLUCOSE SERPL-MCNC: 94 MG/DL (ref 65–100)
HCT VFR BLD AUTO: 37.1 % (ref 36.6–50.3)
HGB BLD-MCNC: 12.7 G/DL (ref 12.1–17)
MAGNESIUM SERPL-MCNC: 2.1 MG/DL (ref 1.6–2.4)
MCH RBC QN AUTO: 32.2 PG (ref 26–34)
MCHC RBC AUTO-ENTMCNC: 34.2 G/DL (ref 30–36.5)
MCV RBC AUTO: 94.2 FL (ref 80–99)
NRBC # BLD: 0 K/UL (ref 0–0.01)
NRBC BLD-RTO: 0 PER 100 WBC
PHOSPHATE SERPL-MCNC: 3.1 MG/DL (ref 2.6–4.7)
PLATELET # BLD AUTO: 292 K/UL (ref 150–400)
PMV BLD AUTO: 9.3 FL (ref 8.9–12.9)
POTASSIUM SERPL-SCNC: 4 MMOL/L (ref 3.5–5.1)
RBC # BLD AUTO: 3.94 M/UL (ref 4.1–5.7)
SODIUM SERPL-SCNC: 139 MMOL/L (ref 136–145)
WBC # BLD AUTO: 4.7 K/UL (ref 4.1–11.1)

## 2024-11-02 PROCEDURE — 85027 COMPLETE CBC AUTOMATED: CPT

## 2024-11-02 PROCEDURE — 80069 RENAL FUNCTION PANEL: CPT

## 2024-11-02 PROCEDURE — 83735 ASSAY OF MAGNESIUM: CPT

## 2024-11-02 PROCEDURE — 6360000002 HC RX W HCPCS: Performed by: INTERNAL MEDICINE

## 2024-11-02 PROCEDURE — 6370000000 HC RX 637 (ALT 250 FOR IP): Performed by: INTERNAL MEDICINE

## 2024-11-02 PROCEDURE — 6370000000 HC RX 637 (ALT 250 FOR IP): Performed by: STUDENT IN AN ORGANIZED HEALTH CARE EDUCATION/TRAINING PROGRAM

## 2024-11-02 PROCEDURE — 36415 COLL VENOUS BLD VENIPUNCTURE: CPT

## 2024-11-02 PROCEDURE — 1100000003 HC PRIVATE W/ TELEMETRY

## 2024-11-02 RX ADMIN — Medication 10.5 MG: at 20:50

## 2024-11-02 RX ADMIN — ENOXAPARIN SODIUM 40 MG: 100 INJECTION SUBCUTANEOUS at 08:15

## 2024-11-02 RX ADMIN — CARBIDOPA AND LEVODOPA 0.5 TABLET: 25; 100 TABLET ORAL at 20:48

## 2024-11-02 RX ADMIN — CARBIDOPA AND LEVODOPA 0.5 TABLET: 25; 100 TABLET ORAL at 02:23

## 2024-11-02 RX ADMIN — Medication 5000 UNITS: at 08:10

## 2024-11-02 RX ADMIN — ACETAMINOPHEN 1000 MG: 500 TABLET ORAL at 20:49

## 2024-11-02 RX ADMIN — CARBIDOPA, LEVODOPA AND ENTACAPONE 1 TABLET: 31.25; 200; 125 TABLET, FILM COATED ORAL at 12:15

## 2024-11-02 RX ADMIN — LEVOTHYROXINE SODIUM 25 MCG: 0.03 TABLET ORAL at 05:18

## 2024-11-02 RX ADMIN — Medication: at 08:15

## 2024-11-02 RX ADMIN — CARBIDOPA, LEVODOPA AND ENTACAPONE 1 TABLET: 31.25; 200; 125 TABLET, FILM COATED ORAL at 08:08

## 2024-11-02 RX ADMIN — MIRTAZAPINE 45 MG: 15 TABLET, FILM COATED ORAL at 20:58

## 2024-11-02 RX ADMIN — CARBIDOPA, LEVODOPA AND ENTACAPONE 1 TABLET: 31.25; 200; 125 TABLET, FILM COATED ORAL at 14:18

## 2024-11-02 RX ADMIN — CARBIDOPA AND LEVODOPA 1 TABLET: 25; 100 TABLET ORAL at 16:18

## 2024-11-02 RX ADMIN — CARBIDOPA, LEVODOPA AND ENTACAPONE 1 TABLET: 31.25; 200; 125 TABLET, FILM COATED ORAL at 16:19

## 2024-11-02 RX ADMIN — CARBIDOPA AND LEVODOPA 1 TABLET: 50; 200 TABLET, EXTENDED RELEASE ORAL at 20:48

## 2024-11-02 RX ADMIN — PAROXETINE HYDROCHLORIDE 30 MG: 20 TABLET, FILM COATED ORAL at 20:58

## 2024-11-02 RX ADMIN — CARBIDOPA AND LEVODOPA 0.5 TABLET: 25; 100 TABLET ORAL at 08:08

## 2024-11-02 RX ADMIN — IBUPROFEN 400 MG: 400 TABLET, FILM COATED ORAL at 08:11

## 2024-11-02 RX ADMIN — CARBIDOPA AND LEVODOPA 1 TABLET: 50; 200 TABLET, EXTENDED RELEASE ORAL at 02:23

## 2024-11-02 RX ADMIN — CARBIDOPA AND LEVODOPA 0.5 TABLET: 25; 100 TABLET ORAL at 14:15

## 2024-11-02 RX ADMIN — ACETAMINOPHEN 1000 MG: 500 TABLET ORAL at 08:14

## 2024-11-02 RX ADMIN — IBUPROFEN 400 MG: 400 TABLET, FILM COATED ORAL at 12:13

## 2024-11-02 RX ADMIN — CARBIDOPA, LEVODOPA AND ENTACAPONE 1 TABLET: 31.25; 200; 125 TABLET, FILM COATED ORAL at 10:02

## 2024-11-02 RX ADMIN — IBUPROFEN 400 MG: 400 TABLET, FILM COATED ORAL at 16:18

## 2024-11-02 RX ADMIN — CARBIDOPA, LEVODOPA AND ENTACAPONE 1 TABLET: 31.25; 200; 125 TABLET, FILM COATED ORAL at 20:55

## 2024-11-02 RX ADMIN — DOCUSATE SODIUM 100 MG: 100 CAPSULE, LIQUID FILLED ORAL at 08:11

## 2024-11-02 RX ADMIN — CARBIDOPA, LEVODOPA AND ENTACAPONE 1 TABLET: 31.25; 200; 125 TABLET, FILM COATED ORAL at 18:01

## 2024-11-02 RX ADMIN — DOCUSATE SODIUM 100 MG: 100 CAPSULE, LIQUID FILLED ORAL at 20:49

## 2024-11-02 RX ADMIN — CARBIDOPA AND LEVODOPA 1 TABLET: 50; 200 TABLET, EXTENDED RELEASE ORAL at 05:18

## 2024-11-02 RX ADMIN — ACETAMINOPHEN 1000 MG: 500 TABLET ORAL at 14:15

## 2024-11-02 RX ADMIN — OLANZAPINE 2.5 MG: 5 TABLET, FILM COATED ORAL at 08:11

## 2024-11-02 RX ADMIN — Medication 3 TABLET: at 18:02

## 2024-11-02 RX ADMIN — Medication: at 20:54

## 2024-11-02 ASSESSMENT — PAIN DESCRIPTION - ORIENTATION
ORIENTATION: POSTERIOR

## 2024-11-02 ASSESSMENT — PAIN DESCRIPTION - LOCATION
LOCATION: BACK

## 2024-11-02 ASSESSMENT — PAIN DESCRIPTION - DESCRIPTORS
DESCRIPTORS: ACHING

## 2024-11-02 ASSESSMENT — PAIN SCALES - GENERAL
PAINLEVEL_OUTOF10: 4
PAINLEVEL_OUTOF10: 8
PAINLEVEL_OUTOF10: 5
PAINLEVEL_OUTOF10: 5
PAINLEVEL_OUTOF10: 6

## 2024-11-02 NOTE — PROGRESS NOTES
Hospitalist Progress Note    NAME:   Fan Vang   : 1950   MRN: [id number]     Date/Time: 2024 9:37 AM  Patient PCP: Shi Ochoa MD    Estimated discharge date: 11/3/24   Barriers:     Placement, memory care    Interim history 2024: No acute overnight event.  Patient was intermittently confused . reported he went to a party last night.  However, he was able to answer all the review of system correctly.  He denies having any acute distress.  All review of system negative.  Plan of care about sending to the memory care was explained to the patient in detail.  Patient was in agreement for going to the memory care for further care.        Assessment / Plan:    Confusional state and expressive aphasia, Intermittent   Severe Parkinson, progressive Parkinson disease  Status post deep brain stimulator in place  Aggressive degenerative spine disease     Will continue to monitor to neurofloor, patient has had on and off confusion and expressive aphasia for few weeks  CT of the head negative, cannot do MRI due to brain stimulator  Normal  B12 folic acid ammonia and TSH  Consult neurology,  defer the need for EEG neurology  Resume Parkinson meds  Patient seen by the [facility] neurology  Physical therapy and Occupational Therapy recommended: Memory care  Patient agree for going to memory care,  working on that           Thyroid cancer s/p radiation  Hypothyroidism  Not on any meds  TSH 11.90, free ,T4   0.5  --Will start with 25 mcg levothyroxine, based on the tolerability will increase the dose  --Will discuss with wife regarding levothyroxin replacement            Insomnia and depression  Continue with Paxil, melatonin     Chronic back pain  Takes Advil        Code Status: Full code  DVT Prophylaxis: Lovenox  GI Prophylaxis: Famotidine        Objective:     VITALS:   Last 24hrs VS reviewed since prior progress note. Most recent are:  Patient Vitals for the past 24 hrs:   BP Temp Temp src

## 2024-11-02 NOTE — CONSULTS
Chief complaint  “I feel lousy”    History of present illness  The patient, Fan, reported feeling \"lousy most of the time\". He mentioned having Parkinsons disease and having thyroid cancer. Fan reported sleeping very well, which he found surprising. He also mentioned having vivid memories that felt real to him, which occurred a few days prior to the consultation. These memories seemed to be triggered by “comunicative meetings” or similar events.     Fan's main concern was severe pain in his lower back, which he described as his main issue. He mentioned that this pain was not uncommon but it was driving him crazy. He expressed dissatisfaction with his quality of life due to this pain, stating that it was not a way of living. The pain seemed to trigger feelings of anger and sadness in him. He also mentioned that he used to be very athletic, but now he is not, which could be a source of frustration for him.    Fan reported being on carbidopa/levodopa for Parkinson's disease, but he expressed doubts about its necessity and effectiveness. He also mentioned experiencing chitter chatter, which could be a symptom of his Parkinson's disease. He reported forcing himself to read softly, which could be a coping mechanism for this symptom.    Fan reported having thoughts of hurting himself, and he admitted to attempting an overdose on lorazepam, \"I think a week ago\", which he stated he takes once a day at night. He expressed feelings of despair, stating that he sometimes feels that life is not worth living. He also mentioned being on paroxetine (Paxil), but did not specify for how long..    Fan reported having vivid memories that felt like hallucinations. These memories seemed to have started recently and were becoming more frequent. He expressed hope that he would make some physical progress, possibly in relation to his Parkinson's disease.    Fan mentioned having Parkinson's disease as the first catastrophe in

## 2024-11-02 NOTE — BSMART NOTE
[facility]     Section I - Integrated Summary    Reason for consult is: increased agitation related to dementia .    LOS:  2     Presenting problem/Summary:  Pt came to ED 10/31/24 c/o \"worsening confusion over the past few weeks with difficulty finding the correct words.\" Psychiatry consult ordered 11/1/24 d/t concerns for \"increased agitation related to dementia .\" Psychiatry met with Pt earlier today and recommended transfer to [facility] for inpatient psychiatric treatment once medically cleared.     Liaison met with PT face to face on medical unit. Pt was alert, oriented to person and time but thought he was in a senior living home \"[facility]\". He wasn't sure why he would have come to the hospital. Pt reported being in a bad mood and feeling depressed (7 out of 10) and anxious (9 out of 10) d/t his physical decline. Pt listed his physical issues (parkinson's, back pain, detached retina, vertigo) and share how they have shrunk his world and daily living. Pt grieves his loss of functioning and independence. When asked about feelings of hopelessness Pt replied \"that's my middle name.\" When asked about SI Pt confirmed he had taken an OD about a week ago but then abruptly said \"and that is all we are saying about that\" then asked liaison to leave so he could sleep. Liaison provided emotional support and active listening; however, full assessment could not be completed d/t Pt's request to end early for rest. Liaison educated on frequency of follow up visits and encouraged Pt to communicate with nursing if any MH concerns arise. Pt agreeable and expressed appreciation and understanding.     Liaison message MARYBEL Winslow about case and to clarify if 1:1 sitter is recommended. No need for sitter at this time per PMHNP.    Precipitant Factors are declining health.    The information is given by the patient and past medical records.  Current Psychiatrist and/or  is none  reported.  Previous Hospitalizations/Treatment: none reported    Plan: Psychiatry \"Recommend admission to [facility].\" Please refer to psychiatric consult note for further assessment and recommendation.     Lethality Assessment:  The potential for suicide is noted by the following;  previous history of attempts which occurred on approximately a week ago in the form(s) of OD.    The potential for homicide is not noted.  The patient has not been a perpetrator of sexual or physical abuse.    There are not pending charges.  The patient is  felt to be at risk for self-harm or harm to others.      Section II - Psychosocial  The patient's overall mood and attitude is depressed and anxious.  Feelings of helplessness and hopelessness are observed by Pt statements.  Generalized anxiety is observed by Pt statements.  Panic is not observed. Phobias are not observed.  Obsessive compulsive tendencies are not observed.      Section III - Mental Status Exam  The patient's appearance shows no evidence of impairment.  The patient's behavior shows no evidence of impairment. The patient is only aware of  time and person.  The patient's speech is slowed and is soft.  The patient's mood is depressed and is anxious.  The range of affect shows no evidence of impairment.  The patient's thought content demonstrates no evidence of impairment.  The thought process shows no evidence of impairment.  The patient's perception shows no evidence of impairment. The patient's memory is impaired.  The patient's judgement & insight are impaired.       Section IV - Substance Abuse  The patient declined to complete assessment and did not disclose information about substance use.     Section V - Medical  Past Medical History:   Diagnosis Date    Arthritis     back and left knee    Dependence on walking stick     bilateral/single hiking stick if walking distance    Gout     Parkinson's disease     S/P deep brain stimulator placement     pt has a remote for this

## 2024-11-02 NOTE — PROGRESS NOTES
End of Shift Note    Bedside shift change report given to Albertina RN  (oncoming nurse) by Gabriela Azevedo RN .        Shift worked:  7p - 7a   Shift summary and any significant changes:    Patient periodic confusion and impulsiveness continues.     Concerns for physician to address: Noted   Zone phone for oncoming shift:  [phone]     Patient Information  Fan Vang  73 y.o.  10/31/2024 10:55 AM by Sheila Solano MD. Fan Vang was admitted from [facility]    Problem List  Patient Active Problem List    Diagnosis Date Noted    Encephalopathy acute 11/01/2024    Depression 11/01/2024    Degeneration of intervertebral disc of lumbar region with discogenic back pain and lower extremity pain 11/01/2024    Confusion and disorientation 10/31/2024    Parkinson's disease (HCC)     Arthritis     S/P deep brain stimulator placement     Gout     Thyroid cancer (HCC)      Past Medical History:   Diagnosis Date    Arthritis     back and left knee    Dependence on walking stick     bilateral/single hiking stick if walking distance    Gout     Parkinson's disease     S/P deep brain stimulator placement     pt has a remote for this device    Thyroid cancer (HCC)     radioactive iodine 4/2020, and partial thyroidectomy    Uses brace     back brace       Core Measures:  CVA: yes  CHF: no  PNA: no    Activity:     Number times ambulated in hallways past shift: 0  Number of times OOB to chair past shift: 0    Cardiac:   Cardiac Monitoring: yes    Access:   Current line(s): PIV    Respiratory:   O2 Device: None (Room air)    GI:  Last BM (including prior to admit): 11/01/24  Current diet:  DIET ONE TIME MESSAGE;  ADULT DIET; Regular  DIET ONE TIME MESSAGE;  Tolerating current diet: Yes    Pain Management:   Patient states pain is manageable on current regimen: yes    Skin:  Jude Scale Score: 17  Interventions: dual skin   Pressure injury: no    Patient Safety:  Fall Score: Flores Total Score: 90  Interventions: stay with me  program  Self-release roll belt: Yes  Dexterity to release roll belt: Yes.  (must document dexterity  here by stating Yes or No here, otherwise this is a restraint and must follow restraint documentation policy.)    DVT prophylaxis:  DVT prophylaxis: meds    Active Consults:  IP CONSULT TO CASE MANAGEMENT  IP CONSULT TO HOSPITALIST  IP CONSULT TO PHARMACY  IP CONSULT TO NEUROLOGY  IP CONSULT TO PSYCHIATRY    Length of Stay:  Expected LOS: 4  Actual LOS: 2    Gabriela Azevedo RN

## 2024-11-02 NOTE — PLAN OF CARE
Problem: Safety - Adult  Goal: Free from fall injury  Outcome: Progressing     Problem: Discharge Planning  Goal: Discharge to home or other facility with appropriate resources  Outcome: Progressing     Problem: ABCDS Injury Assessment  Goal: Absence of physical injury  Outcome: Progressing     Problem: Skin/Tissue Integrity  Goal: Absence of new skin breakdown  Description: 1.  Monitor for areas of redness and/or skin breakdown  2.  Assess vascular access sites hourly  3.  Every 4-6 hours minimum:  Change oxygen saturation probe site  4.  Every 4-6 hours:  If on nasal continuous positive airway pressure, respiratory therapy assess nares and determine need for appliance change or resting period.  Outcome: Progressing     Problem: Neurosensory - Adult  Goal: Achieves maximal functionality and self care  Outcome: Progressing     Problem: Skin/Tissue Integrity - Adult  Goal: Skin integrity remains intact  Outcome: Progressing  Flowsheets (Taken 11/2/2024 0807)  Skin Integrity Remains Intact: Monitor for areas of redness and/or skin breakdown     Problem: Musculoskeletal - Adult  Goal: Return mobility to safest level of function  Outcome: Progressing  Goal: Return ADL status to a safe level of function  Outcome: Progressing     Problem: Metabolic/Fluid and Electrolytes - Adult  Goal: Electrolytes maintained within normal limits  Outcome: Progressing     Problem: Pain  Goal: Verbalizes/displays adequate comfort level or baseline comfort level  Outcome: Progressing

## 2024-11-02 NOTE — PROGRESS NOTES
End of Shift Note    Bedside shift change report given to  KATYA Cifuentes  (oncoming nurse) by Albertina Fernando RN .        Shift worked:  Days   Shift summary and any significant changes:    Patient continues to have back pain, scheduled pain medications given. Patient reports satisfaction with pain management.  Roll belt on patient. Patient demonstrated dexterity to remove belt.     Patient's wife visited today and brought more of his home medications: stalevo and glucosamine. Pharmacy verified the medications and placed them in the pt med bins in the [facility]nicell.     Concerns for physician to address:  Days   Zone phone for oncoming shift:  [phone]     Patient Information  Fan Vang  73 y.o.  10/31/2024 10:55 AM by Sheila Solano MD. Fan Vang was admitted from [facility]    Problem List  Patient Active Problem List    Diagnosis Date Noted    Encephalopathy acute 11/01/2024    Depression 11/01/2024    Degeneration of intervertebral disc of lumbar region with discogenic back pain and lower extremity pain 11/01/2024    Confusion and disorientation 10/31/2024    Parkinson's disease (HCC)     Arthritis     S/P deep brain stimulator placement     Gout     Thyroid cancer (HCC)      Past Medical History:   Diagnosis Date    Arthritis     back and left knee    Dependence on walking stick     bilateral/single hiking stick if walking distance    Gout     Parkinson's disease     S/P deep brain stimulator placement     pt has a remote for this device    Thyroid cancer (HCC)     radioactive iodine 4/2020, and partial thyroidectomy    Uses brace     back brace       Core Measures:  CVA: yes  CHF: no  PNA: no    Activity:     Number times ambulated in hallways past shift: 0  Number of times OOB to chair past shift: 0    Cardiac:   Cardiac Monitoring: yes    Access:   Current line(s): PIV    Respiratory:   O2 Device: None (Room air)    GI:  Last BM (including prior to admit): 11/01/24  Current diet:  DIET ONE TIME MESSAGE;  ADULT DIET;

## 2024-11-03 LAB
ALBUMIN SERPL-MCNC: 3.6 G/DL (ref 3.5–5)
ANION GAP SERPL CALC-SCNC: 3 MMOL/L (ref 2–12)
BUN SERPL-MCNC: 21 MG/DL (ref 6–20)
BUN/CREAT SERPL: 28 (ref 12–20)
CALCIUM SERPL-MCNC: 9.1 MG/DL (ref 8.5–10.1)
CHLORIDE SERPL-SCNC: 110 MMOL/L (ref 97–108)
CO2 SERPL-SCNC: 26 MMOL/L (ref 21–32)
CREAT SERPL-MCNC: 0.75 MG/DL (ref 0.7–1.3)
ERYTHROCYTE [DISTWIDTH] IN BLOOD BY AUTOMATED COUNT: 13.1 % (ref 11.5–14.5)
GLUCOSE SERPL-MCNC: 90 MG/DL (ref 65–100)
HCT VFR BLD AUTO: 36.8 % (ref 36.6–50.3)
HGB BLD-MCNC: 12.3 G/DL (ref 12.1–17)
MAGNESIUM SERPL-MCNC: 2 MG/DL (ref 1.6–2.4)
MCH RBC QN AUTO: 31.5 PG (ref 26–34)
MCHC RBC AUTO-ENTMCNC: 33.4 G/DL (ref 30–36.5)
MCV RBC AUTO: 94.1 FL (ref 80–99)
NRBC # BLD: 0 K/UL (ref 0–0.01)
NRBC BLD-RTO: 0 PER 100 WBC
PHOSPHATE SERPL-MCNC: 3.8 MG/DL (ref 2.6–4.7)
PLATELET # BLD AUTO: 269 K/UL (ref 150–400)
PMV BLD AUTO: 9.5 FL (ref 8.9–12.9)
POTASSIUM SERPL-SCNC: 3.7 MMOL/L (ref 3.5–5.1)
RBC # BLD AUTO: 3.91 M/UL (ref 4.1–5.7)
SODIUM SERPL-SCNC: 139 MMOL/L (ref 136–145)
WBC # BLD AUTO: 6.6 K/UL (ref 4.1–11.1)

## 2024-11-03 PROCEDURE — 83735 ASSAY OF MAGNESIUM: CPT

## 2024-11-03 PROCEDURE — 85027 COMPLETE CBC AUTOMATED: CPT

## 2024-11-03 PROCEDURE — 80069 RENAL FUNCTION PANEL: CPT

## 2024-11-03 PROCEDURE — 6370000000 HC RX 637 (ALT 250 FOR IP): Performed by: INTERNAL MEDICINE

## 2024-11-03 PROCEDURE — 1100000003 HC PRIVATE W/ TELEMETRY

## 2024-11-03 PROCEDURE — 6370000000 HC RX 637 (ALT 250 FOR IP): Performed by: STUDENT IN AN ORGANIZED HEALTH CARE EDUCATION/TRAINING PROGRAM

## 2024-11-03 PROCEDURE — 36415 COLL VENOUS BLD VENIPUNCTURE: CPT

## 2024-11-03 PROCEDURE — 6360000002 HC RX W HCPCS: Performed by: INTERNAL MEDICINE

## 2024-11-03 RX ADMIN — IBUPROFEN 400 MG: 400 TABLET, FILM COATED ORAL at 07:40

## 2024-11-03 RX ADMIN — ACETAMINOPHEN 1000 MG: 500 TABLET ORAL at 07:41

## 2024-11-03 RX ADMIN — ENOXAPARIN SODIUM 40 MG: 100 INJECTION SUBCUTANEOUS at 07:41

## 2024-11-03 RX ADMIN — Medication 3 TABLET: at 07:42

## 2024-11-03 RX ADMIN — CARBIDOPA AND LEVODOPA 1 TABLET: 50; 200 TABLET, EXTENDED RELEASE ORAL at 06:05

## 2024-11-03 RX ADMIN — Medication: at 07:55

## 2024-11-03 RX ADMIN — CARBIDOPA AND LEVODOPA 0.5 TABLET: 25; 100 TABLET ORAL at 07:40

## 2024-11-03 RX ADMIN — MIRTAZAPINE 45 MG: 15 TABLET, FILM COATED ORAL at 21:07

## 2024-11-03 RX ADMIN — CARBIDOPA, LEVODOPA AND ENTACAPONE 1 TABLET: 31.25; 200; 125 TABLET, FILM COATED ORAL at 21:10

## 2024-11-03 RX ADMIN — ONDANSETRON 4 MG: 2 INJECTION INTRAMUSCULAR; INTRAVENOUS at 00:36

## 2024-11-03 RX ADMIN — CARBIDOPA AND LEVODOPA 1 TABLET: 25; 100 TABLET ORAL at 16:26

## 2024-11-03 RX ADMIN — CARBIDOPA AND LEVODOPA 0.5 TABLET: 25; 100 TABLET ORAL at 14:14

## 2024-11-03 RX ADMIN — CARBIDOPA, LEVODOPA AND ENTACAPONE 1 TABLET: 31.25; 200; 125 TABLET, FILM COATED ORAL at 11:59

## 2024-11-03 RX ADMIN — IBUPROFEN 400 MG: 400 TABLET, FILM COATED ORAL at 16:26

## 2024-11-03 RX ADMIN — CARBIDOPA, LEVODOPA AND ENTACAPONE 1 TABLET: 31.25; 200; 125 TABLET, FILM COATED ORAL at 10:33

## 2024-11-03 RX ADMIN — DOCUSATE SODIUM 100 MG: 100 CAPSULE, LIQUID FILLED ORAL at 07:40

## 2024-11-03 RX ADMIN — PAROXETINE HYDROCHLORIDE 30 MG: 20 TABLET, FILM COATED ORAL at 21:08

## 2024-11-03 RX ADMIN — ACETAMINOPHEN 1000 MG: 500 TABLET ORAL at 21:06

## 2024-11-03 RX ADMIN — CARBIDOPA AND LEVODOPA 0.5 TABLET: 25; 100 TABLET ORAL at 21:08

## 2024-11-03 RX ADMIN — CARBIDOPA AND LEVODOPA 1 TABLET: 50; 200 TABLET, EXTENDED RELEASE ORAL at 02:17

## 2024-11-03 RX ADMIN — LEVOTHYROXINE SODIUM 25 MCG: 0.03 TABLET ORAL at 06:05

## 2024-11-03 RX ADMIN — Medication: at 21:13

## 2024-11-03 RX ADMIN — Medication 10.5 MG: at 21:05

## 2024-11-03 RX ADMIN — OLANZAPINE 2.5 MG: 5 TABLET, FILM COATED ORAL at 07:40

## 2024-11-03 RX ADMIN — CARBIDOPA, LEVODOPA AND ENTACAPONE 1 TABLET: 31.25; 200; 125 TABLET, FILM COATED ORAL at 18:13

## 2024-11-03 RX ADMIN — CARBIDOPA, LEVODOPA AND ENTACAPONE 1 TABLET: 31.25; 200; 125 TABLET, FILM COATED ORAL at 16:26

## 2024-11-03 RX ADMIN — CARBIDOPA, LEVODOPA AND ENTACAPONE 1 TABLET: 31.25; 200; 125 TABLET, FILM COATED ORAL at 07:39

## 2024-11-03 RX ADMIN — DOCUSATE SODIUM 100 MG: 100 CAPSULE, LIQUID FILLED ORAL at 21:09

## 2024-11-03 RX ADMIN — CARBIDOPA AND LEVODOPA 1 TABLET: 50; 200 TABLET, EXTENDED RELEASE ORAL at 21:06

## 2024-11-03 RX ADMIN — IBUPROFEN 400 MG: 400 TABLET, FILM COATED ORAL at 12:20

## 2024-11-03 RX ADMIN — Medication 5000 UNITS: at 07:40

## 2024-11-03 RX ADMIN — CARBIDOPA AND LEVODOPA 0.5 TABLET: 25; 100 TABLET ORAL at 02:17

## 2024-11-03 RX ADMIN — CARBIDOPA, LEVODOPA AND ENTACAPONE 1 TABLET: 31.25; 200; 125 TABLET, FILM COATED ORAL at 14:14

## 2024-11-03 RX ADMIN — ACETAMINOPHEN 1000 MG: 500 TABLET ORAL at 14:14

## 2024-11-03 ASSESSMENT — PAIN SCALES - GENERAL
PAINLEVEL_OUTOF10: 5
PAINLEVEL_OUTOF10: 7
PAINLEVEL_OUTOF10: 7
PAINLEVEL_OUTOF10: 6
PAINLEVEL_OUTOF10: 6
PAINLEVEL_OUTOF10: 5

## 2024-11-03 ASSESSMENT — PAIN DESCRIPTION - LOCATION
LOCATION: BACK

## 2024-11-03 ASSESSMENT — PAIN DESCRIPTION - DESCRIPTORS
DESCRIPTORS: ACHING

## 2024-11-03 ASSESSMENT — PAIN DESCRIPTION - ORIENTATION
ORIENTATION: POSTERIOR

## 2024-11-03 NOTE — PROGRESS NOTES
End of Shift Note    Bedside shift change report given to KATYA Cifuentes  (oncoming nurse) by Albertina Fernando RN .        Shift worked:  Days   Shift summary and any significant changes:    Patient continues to have back pain, scheduled pain medications given. Patient reports satisfaction with pain management.  Roll belt not on patient currently    Patient's wife, Rianna Vang, called expressing some concerns. She has been reading the notes in his chart, particularly the psychiatry note, and wanted to clarify that his overdose attempt was 3 years ago, not a week ago. She stated that he was admitted to an inpatient psych [facility] following that incident. She also voiced concern about increasing his levothyroxine dose, which was mentioned in Bev ORLANDO's note, due to severe side effects with higher doses which he has experienced previously. I notified Bev ORLANDO of the above. Bev attempted to call patient's wife and asked that we request the psychiatrist to speak with the patient's wife as well.     Concerns for physician to address:  Days   Zone phone for oncoming shift:  [phone]     Patient Information  Fan Vang  73 y.o.  10/31/2024 10:55 AM by Sheila Solano MD. Fan Vang was admitted from [facility]    Problem List  Patient Active Problem List    Diagnosis Date Noted    Encephalopathy acute 11/01/2024    Depression 11/01/2024    Degeneration of intervertebral disc of lumbar region with discogenic back pain and lower extremity pain 11/01/2024    Confusion and disorientation 10/31/2024    Parkinson's disease (HCC)     Arthritis     S/P deep brain stimulator placement     Gout     Thyroid cancer (HCC)      Past Medical History:   Diagnosis Date    Arthritis     back and left knee    Dependence on walking stick     bilateral/single hiking stick if walking distance    Gout     Parkinson's disease     S/P deep brain stimulator placement     pt has a remote for this device    Thyroid cancer (HCC)     radioactive iodine 4/2020, and partial

## 2024-11-03 NOTE — PROGRESS NOTES
Hospitalist Progress Note    NAME:   Fan Vang   : 1950   MRN: [id number]     Date/Time: 11/3/2024 10:10 AM  Patient PCP: Shi Ochoa MD    Estimated discharge date: 24   Barriers:     Placement, memory care/ [facility]     Interim history 11/3/2024:    No acute overnight event.  Patient reported he is feeling lousy.  He reported that his wife left him and has not get back to him from the last 4 hours.  Patient was worried that he will go now.  Patient agreed to go to  Memory care or any place with someone to take care of him.  He denies other acute distress.  Rest of review of system negative        Assessment / Plan:    Confusional state and expressive aphasia, Intermittent   Severe Parkinson, progressive Parkinson disease  Status post deep brain stimulator in place  Aggressive degenerative spine disease  Major depressive disorder, severe, recurrent     Will continue to monitor to neurofloor, patient has had on and off confusion and expressive aphasia for few weeks  CT of the head negative, cannot do MRI due to brain stimulator  Normal  B12 folic acid ammonia and TSH  Consult neurology,  defer the need for EEG neurology  Resume Parkinson meds  Patient seen by the [facility] neurology  Physical therapy and Occupational Therapy recommended: Memory care  Patient agree for going to memory care,  working on that psychiatry consult:  \"Recommend admission to [facility]. Contact transfer center for assist with finding placement. If he becomes involuntary, contact [facility] for a TDO assessment  -Recommend suicide precautions\"   --CM consult for placement            Thyroid cancer s/p radiation  Hypothyroidism  Not on any meds  TSH 11.90, free ,T4   0.5  -start with 25 mcg levothyroxine, based on the tolerability will increase the dose  --Will discuss with wife regarding levothyroxin replacement            Insomnia and depression  Continue with Paxil, melatonin     Chronic back pain  Takes Advil        Code

## 2024-11-03 NOTE — PLAN OF CARE
Problem: Safety - Adult  Goal: Free from fall injury  11/3/2024 0911 by Albertina Fernando RN  Outcome: Progressing  11/3/2024 0125 by Shireen Schulz RN  Outcome: Progressing     Problem: Discharge Planning  Goal: Discharge to home or other facility with appropriate resources  11/3/2024 0911 by Albertina Fernando RN  Outcome: Progressing  11/3/2024 0125 by Shireen Schulz RN  Outcome: Progressing     Problem: ABCDS Injury Assessment  Goal: Absence of physical injury  11/3/2024 0911 by Albertina Fernando RN  Outcome: Progressing  11/3/2024 0125 by Shireen Schulz RN  Outcome: Progressing     Problem: Skin/Tissue Integrity  Goal: Absence of new skin breakdown  Description: 1.  Monitor for areas of redness and/or skin breakdown  2.  Assess vascular access sites hourly  3.  Every 4-6 hours minimum:  Change oxygen saturation probe site  4.  Every 4-6 hours:  If on nasal continuous positive airway pressure, respiratory therapy assess nares and determine need for appliance change or resting period.  11/3/2024 0911 by Albertina Fernando RN  Outcome: Progressing  11/3/2024 0125 by Shireen Schulz RN  Outcome: Progressing     Problem: Neurosensory - Adult  Goal: Achieves maximal functionality and self care  11/3/2024 0911 by Albertina Fernando RN  Outcome: Progressing  11/3/2024 0125 by Shireen Schulz RN  Outcome: Progressing     Problem: Skin/Tissue Integrity - Adult  Goal: Skin integrity remains intact  11/3/2024 0911 by Albertina Fernando RN  Outcome: Progressing  Flowsheets (Taken 11/3/2024 0737)  Skin Integrity Remains Intact: Monitor for areas of redness and/or skin breakdown  11/3/2024 0125 by Shireen Schulz RN  Outcome: Progressing     Problem: Musculoskeletal - Adult  Goal: Return mobility to safest level of function  11/3/2024 0911 by Albertina Fernando RN  Outcome: Progressing  11/3/2024 0125 by Shireen Schulz RN  Outcome: Progressing  Goal: Return ADL status to a safe level of function  11/3/2024 0911 by Albertina Fernando RN  Outcome:  Progressing  11/3/2024 0125 by Shireen Schulz RN  Outcome: Progressing     Problem: Metabolic/Fluid and Electrolytes - Adult  Goal: Electrolytes maintained within normal limits  11/3/2024 0911 by Albertina Fernando RN  Outcome: Progressing  11/3/2024 0125 by Shireen Schulz RN  Outcome: Progressing     Problem: Pain  Goal: Verbalizes/displays adequate comfort level or baseline comfort level  11/3/2024 0911 by Albertina Fernando RN  Outcome: Progressing  11/3/2024 0125 by Shireen Schulz RN  Outcome: Progressing

## 2024-11-03 NOTE — PROGRESS NOTES
Spiritual Health History and Assessment/Progress Note  [facility]    Loneliness/Social Isolation, Initial Encounter,  ,  ,      Name: Fan Vang MRN: [id number]    Age: 73 y.o.     Sex: male   Language: English   Jew: Presbyterian   Confusion and disorientation     Date: 11/3/2024            Total Time Calculated: 20 min              Spiritual Assessment began in [facility] NEUROSCIENCE TELEMETRY        Referral/Consult From: Rounding   Encounter Overview/Reason: Loneliness/Social Isolation, Initial Encounter  Service Provided For: Patient    Riri, Belief, Meaning:   Patient identifies as spiritual and is connected with a riri tradition or spiritual practice  Presbyterian  Family/Friends No family/friends present      Importance and Influence:  Patient has no beliefs influential to healthcare decision-making identified during this visit  Family/Friends No family/friends present    Community:  Patient expresses feelings of isolation: feeling there is no one to turn to for help and feeling no one understands  Family/Friends No family/friends present    Assessment and Plan of Care:     Patient Interventions include: Facilitated expression of thoughts and feelings and Explored spiritual coping/struggle/distress  Family/Friends Interventions include: No family/friends present    Patient Plan of Care: Spiritual Care available upon further referral  Family/Friends Plan of Care: No family/friends present    Visited patient on Neuro unit.  Mr Vang appeared to have some confusion.  He expressed feeling frightened and not understanding why his wife left and hasn't returned.  offered supportive listening, words of reassurance and assurance of prayer.      Electronically signed by Chaplain LIEN [facility] on 11/3/2024 at 12:24 PM

## 2024-11-04 LAB
ALBUMIN SERPL-MCNC: 3.5 G/DL (ref 3.5–5)
ANION GAP SERPL CALC-SCNC: 2 MMOL/L (ref 2–12)
BUN SERPL-MCNC: 19 MG/DL (ref 6–20)
BUN/CREAT SERPL: 26 (ref 12–20)
CALCIUM SERPL-MCNC: 9.2 MG/DL (ref 8.5–10.1)
CHLORIDE SERPL-SCNC: 111 MMOL/L (ref 97–108)
CO2 SERPL-SCNC: 27 MMOL/L (ref 21–32)
CREAT SERPL-MCNC: 0.73 MG/DL (ref 0.7–1.3)
GLUCOSE SERPL-MCNC: 89 MG/DL (ref 65–100)
PHOSPHATE SERPL-MCNC: 3.9 MG/DL (ref 2.6–4.7)
POTASSIUM SERPL-SCNC: 3.7 MMOL/L (ref 3.5–5.1)
SODIUM SERPL-SCNC: 140 MMOL/L (ref 136–145)

## 2024-11-04 PROCEDURE — 36415 COLL VENOUS BLD VENIPUNCTURE: CPT

## 2024-11-04 PROCEDURE — 6370000000 HC RX 637 (ALT 250 FOR IP): Performed by: INTERNAL MEDICINE

## 2024-11-04 PROCEDURE — 6370000000 HC RX 637 (ALT 250 FOR IP): Performed by: STUDENT IN AN ORGANIZED HEALTH CARE EDUCATION/TRAINING PROGRAM

## 2024-11-04 PROCEDURE — 6360000002 HC RX W HCPCS: Performed by: INTERNAL MEDICINE

## 2024-11-04 PROCEDURE — 80069 RENAL FUNCTION PANEL: CPT

## 2024-11-04 PROCEDURE — 1100000003 HC PRIVATE W/ TELEMETRY

## 2024-11-04 RX ORDER — RISPERIDONE 0.25 MG/1
0.5 TABLET ORAL NIGHTLY
Status: DISCONTINUED | OUTPATIENT
Start: 2024-11-04 | End: 2024-11-04

## 2024-11-04 RX ORDER — LEVOTHYROXINE SODIUM 25 UG/1
25 TABLET ORAL 2 TIMES DAILY
Status: DISCONTINUED | OUTPATIENT
Start: 2024-11-04 | End: 2024-11-06 | Stop reason: HOSPADM

## 2024-11-04 RX ORDER — LIOTHYRONINE SODIUM 5 UG/1
10 TABLET ORAL 2 TIMES DAILY
Status: DISCONTINUED | OUTPATIENT
Start: 2024-11-04 | End: 2024-11-06 | Stop reason: HOSPADM

## 2024-11-04 RX ADMIN — CARBIDOPA, LEVODOPA AND ENTACAPONE 1 TABLET: 31.25; 200; 125 TABLET, FILM COATED ORAL at 08:06

## 2024-11-04 RX ADMIN — CARBIDOPA, LEVODOPA AND ENTACAPONE 1 TABLET: 31.25; 200; 125 TABLET, FILM COATED ORAL at 11:01

## 2024-11-04 RX ADMIN — ACETAMINOPHEN 1000 MG: 500 TABLET ORAL at 08:10

## 2024-11-04 RX ADMIN — Medication 3 TABLET: at 08:06

## 2024-11-04 RX ADMIN — Medication 5000 UNITS: at 08:07

## 2024-11-04 RX ADMIN — CARBIDOPA AND LEVODOPA 0.5 TABLET: 25; 100 TABLET ORAL at 14:02

## 2024-11-04 RX ADMIN — LEVOTHYROXINE SODIUM 25 MCG: 0.03 TABLET ORAL at 21:06

## 2024-11-04 RX ADMIN — CARBIDOPA AND LEVODOPA 0.5 TABLET: 25; 100 TABLET ORAL at 01:53

## 2024-11-04 RX ADMIN — LIOTHYRONINE SODIUM 10 MCG: 5 TABLET ORAL at 21:06

## 2024-11-04 RX ADMIN — CARBIDOPA, LEVODOPA AND ENTACAPONE 1 TABLET: 31.25; 200; 125 TABLET, FILM COATED ORAL at 14:03

## 2024-11-04 RX ADMIN — DOCUSATE SODIUM 100 MG: 100 CAPSULE, LIQUID FILLED ORAL at 21:09

## 2024-11-04 RX ADMIN — Medication: at 21:09

## 2024-11-04 RX ADMIN — CARBIDOPA AND LEVODOPA 1 TABLET: 25; 100 TABLET ORAL at 16:20

## 2024-11-04 RX ADMIN — IBUPROFEN 400 MG: 400 TABLET, FILM COATED ORAL at 12:07

## 2024-11-04 RX ADMIN — LEVOTHYROXINE SODIUM 25 MCG: 0.03 TABLET ORAL at 05:54

## 2024-11-04 RX ADMIN — MIRTAZAPINE 45 MG: 15 TABLET, FILM COATED ORAL at 21:06

## 2024-11-04 RX ADMIN — CARBIDOPA AND LEVODOPA 1 TABLET: 50; 200 TABLET, EXTENDED RELEASE ORAL at 01:54

## 2024-11-04 RX ADMIN — IBUPROFEN 400 MG: 400 TABLET, FILM COATED ORAL at 16:20

## 2024-11-04 RX ADMIN — CARBIDOPA, LEVODOPA AND ENTACAPONE 1 TABLET: 31.25; 200; 125 TABLET, FILM COATED ORAL at 16:21

## 2024-11-04 RX ADMIN — CARBIDOPA, LEVODOPA AND ENTACAPONE 1 TABLET: 31.25; 200; 125 TABLET, FILM COATED ORAL at 17:44

## 2024-11-04 RX ADMIN — CARBIDOPA, LEVODOPA AND ENTACAPONE 1 TABLET: 31.25; 200; 125 TABLET, FILM COATED ORAL at 12:07

## 2024-11-04 RX ADMIN — CARBIDOPA AND LEVODOPA 1 TABLET: 50; 200 TABLET, EXTENDED RELEASE ORAL at 05:54

## 2024-11-04 RX ADMIN — Medication: at 08:09

## 2024-11-04 RX ADMIN — DOCUSATE SODIUM 100 MG: 100 CAPSULE, LIQUID FILLED ORAL at 08:09

## 2024-11-04 RX ADMIN — ACETAMINOPHEN 1000 MG: 500 TABLET ORAL at 14:02

## 2024-11-04 RX ADMIN — CARBIDOPA AND LEVODOPA 1 TABLET: 50; 200 TABLET, EXTENDED RELEASE ORAL at 21:07

## 2024-11-04 RX ADMIN — CARBIDOPA AND LEVODOPA 0.5 TABLET: 25; 100 TABLET ORAL at 08:08

## 2024-11-04 RX ADMIN — OLANZAPINE 2.5 MG: 5 TABLET, FILM COATED ORAL at 08:11

## 2024-11-04 RX ADMIN — CARBIDOPA, LEVODOPA AND ENTACAPONE 1 TABLET: 31.25; 200; 125 TABLET, FILM COATED ORAL at 21:12

## 2024-11-04 RX ADMIN — ACETAMINOPHEN 1000 MG: 500 TABLET ORAL at 21:06

## 2024-11-04 RX ADMIN — ENOXAPARIN SODIUM 40 MG: 100 INJECTION SUBCUTANEOUS at 08:07

## 2024-11-04 RX ADMIN — Medication 10.5 MG: at 21:06

## 2024-11-04 RX ADMIN — IBUPROFEN 400 MG: 400 TABLET, FILM COATED ORAL at 08:10

## 2024-11-04 RX ADMIN — PAROXETINE HYDROCHLORIDE 30 MG: 20 TABLET, FILM COATED ORAL at 21:06

## 2024-11-04 RX ADMIN — CARBIDOPA AND LEVODOPA 0.5 TABLET: 25; 100 TABLET ORAL at 21:06

## 2024-11-04 ASSESSMENT — PAIN SCALES - GENERAL
PAINLEVEL_OUTOF10: 3
PAINLEVEL_OUTOF10: 0
PAINLEVEL_OUTOF10: 4
PAINLEVEL_OUTOF10: 3
PAINLEVEL_OUTOF10: 3
PAINLEVEL_OUTOF10: 0

## 2024-11-04 ASSESSMENT — PAIN DESCRIPTION - LOCATION
LOCATION: BACK
LOCATION: BACK
LOCATION: HEAD
LOCATION: BACK

## 2024-11-04 ASSESSMENT — PAIN DESCRIPTION - ONSET
ONSET: ON-GOING

## 2024-11-04 ASSESSMENT — PAIN DESCRIPTION - PAIN TYPE
TYPE: CHRONIC PAIN

## 2024-11-04 ASSESSMENT — PAIN - FUNCTIONAL ASSESSMENT
PAIN_FUNCTIONAL_ASSESSMENT: PREVENTS OR INTERFERES SOME ACTIVE ACTIVITIES AND ADLS
PAIN_FUNCTIONAL_ASSESSMENT: ACTIVITIES ARE NOT PREVENTED

## 2024-11-04 ASSESSMENT — PAIN DESCRIPTION - DESCRIPTORS
DESCRIPTORS: ACHING

## 2024-11-04 ASSESSMENT — PAIN DESCRIPTION - ORIENTATION
ORIENTATION: LOWER
ORIENTATION: ANTERIOR

## 2024-11-04 ASSESSMENT — PAIN DESCRIPTION - FREQUENCY
FREQUENCY: INTERMITTENT

## 2024-11-04 NOTE — PROGRESS NOTES
End of Shift Note    Bedside shift change report given to  Víctor RN  (oncoming nurse) by RADHA VILLEGAS RN .        Shift worked:  7p-7a   Shift summary and any significant changes:    Patient continues to have back pain, scheduled pain medications given. Patient reports satisfaction with pain management.     Concerns for physician to address: none   Zone phone for oncoming shift:  [phone]     Patient Information  Fan Vang  73 y.o.  10/31/2024 10:55 AM by Sheila Solano MD. Fan Vang was admitted from [facility]    Problem List  Patient Active Problem List    Diagnosis Date Noted    Encephalopathy acute 11/01/2024    Depression 11/01/2024    Degeneration of intervertebral disc of lumbar region with discogenic back pain and lower extremity pain 11/01/2024    Confusion and disorientation 10/31/2024    Parkinson's disease (HCC)     Arthritis     S/P deep brain stimulator placement     Gout     Thyroid cancer (HCC)      Past Medical History:   Diagnosis Date    Arthritis     back and left knee    Dependence on walking stick     bilateral/single hiking stick if walking distance    Gout     Parkinson's disease     S/P deep brain stimulator placement     pt has a remote for this device    Thyroid cancer (HCC)     radioactive iodine 4/2020, and partial thyroidectomy    Uses brace     back brace       Core Measures:  CVA: yes  CHF: no  PNA: no    Activity:  Level of Assistance: Maximum assist, patient does 25-49%  Number times ambulated in hallways past shift: 0  Number of times OOB to chair past shift: 0    Cardiac:   Cardiac Monitoring: yes    Access:   Current line(s): PIV    Respiratory:   O2 Device: None (Room air)    GI:  Last BM (including prior to admit): 11/01/24  Current diet:  DIET ONE TIME MESSAGE;  DIET ONE TIME MESSAGE;  ADULT DIET; Regular; Safety Tray; Safety Tray (Disposables)  Tolerating current diet: Yes    Pain Management:   Patient states pain is manageable on current regimen: yes    Skin:  Jude

## 2024-11-04 NOTE — PROGRESS NOTES
Hospitalist Progress Note    NAME:   Fan Vang   : 1950   MRN: [id number]     Date/Time: 2024 8:53 AM  Patient PCP: Shi Ochoa MD    Estimated discharge date: 24   Barriers:   Patient is clinically ready this pending placement    Placement, memory care/ [facility]     Interim history 2024:    Evaluated the patient in the room with wife at bedside.  All review of system negative.  Plan of care communicated with the wife and patient in detail.  All questions from the wife were answered.         Assessment / Plan:    Confusional state and expressive aphasia, Intermittent   Severe Parkinson, progressive Parkinson disease  Status post deep brain stimulator in place  Aggressive degenerative spine disease  Major depressive disorder, severe, recurrent     Will continue to monitor to neurofloor, patient has had on and off confusion and expressive aphasia for few weeks  CT of the head negative, cannot do MRI due to brain stimulator  Normal  B12 folic acid ammonia and TSH  Consult neurology, appreciate recommendations  Resume Parkinson meds  Patient seen by the [facility] neurology  Physical therapy and Occupational Therapy recommended: Memory care  Patient agree for going to memory care,  working on that psychiatry consult:  \"Recommend admission to [facility]. Contact transfer center for assist with finding placement. If he becomes involuntary, contact [facility] for a TDO assessment  -Recommend suicide precautions\"   --CM consult for placement            Thyroid cancer s/p radiation  Hypothyroidism  TSH 11.90, free ,T4   0.5  --Levothyroxine 25 mg twice daily, levothyroxine 10 twice daily, confirmed the medication dosage with wife         Insomnia and depression  Continue with Paxil, melatonin     Chronic back pain  Takes Advil        Code Status: Full code  DVT Prophylaxis: Lovenox  GI Prophylaxis: Famotidine        Objective:     VITALS:   Last 24hrs VS reviewed since prior progress note. Most  recent are:  Patient Vitals for the past 24 hrs:   BP Temp Temp src Pulse Resp SpO2   11/04/24 0743 (!) 166/95 98.1 °F (36.7 °C) Oral 75 18 93 %   11/03/24 2013 (!) 164/90 98.1 °F (36.7 °C) -- 65 16 95 %       No intake or output data in the 24 hours ending 11/04/24 0853       I had a face to face encounter and independently examined this patient on 11/4/2024, as outlined below:  PHYSICAL EXAM:  General: Alert, cooperative, word-finding difficulty, fast speech  EENT:  EOMI. Anicteric sclerae.  Resp:  CTA bilaterally, no wheezing or rales.  No accessory muscle use  CV:  Regular  rhythm,  No edema  GI:  Soft, Non distended, Non tender.  +Bowel sounds  Neurologic:  Alert and oriented X 3, normal speech,   Psych:   Good insight. Not anxious nor agitated  Skin:  No rashes.  No jaundice    Reviewed most current lab test results and cultures  YES  Reviewed most current radiology test results   YES  Review and summation of old records today    NO  Reviewed patient's current orders and MAR    YES  PMH/SH reviewed - no change compared to H&P    Procedures: see electronic medical records for all procedures/Xrays and details which were not copied into this note but were reviewed prior to creation of Plan.      LABS:  I reviewed today's most current labs and imaging studies.  Pertinent labs include:  Recent Labs     11/02/24 0529 11/03/24 0223   WBC 4.7 6.6   HGB 12.7 12.3   HCT 37.1 36.8    269     Recent Labs     11/02/24  0529 11/03/24 0223 11/04/24  0359    139 140   K 4.0 3.7 3.7   * 110* 111*   CO2 26 26 27   GLUCOSE 94 90 89   BUN 14 21* 19   CREATININE 0.70 0.75 0.73   CALCIUM 9.6 9.1 9.2   MG 2.1 2.0  --    PHOS 3.1 3.8 3.9       Signed: Irlanda Pablo MD

## 2024-11-04 NOTE — BSMART NOTE
BSMART Liaison Team Note     LOS:  4 days      Patient goal(s) for today: take medication as prescribed, communicate needs to staff in an appropriate manner   BSMART Liaison team focus/goals: assess needs, provide education and support     Progress note: Liaison met with patient face to face. Pt received eating licorice with his wife, Nicole, at bedside. Pt was alert, oriented, and stated his weekend was \"tough.\" Pt shared he wakes up fine and then throughout the day, pt declines and is in pain. Wife shared pt has been complaining of some chest tenderness and he believes he has had a procedure. Pt is fixated on having had a procedure done earlier today. Wife believes this is due to pt's history of epidural injections from back pain. Wife stated since pt has started the Zyprexa, she described him to be \"goofy\" and in a brain fog. She reports pt has been having a difficult time differentiating his dreams from reality. He also experiences vivid dreams. Pt reported visual hallucinations and then states they go away. Pt denied SI/HI. Pt did share that he was in [place] [employer], and wife confirmed this. As this writer was talking to patient, he stated that the questions are getting \"too deep\" and he did not want to talk about his private records. This writer respected pt's wishes. Wife asked to speak with this writer outside.   Wife reported she was reading through notes in his chart and it was documented somewhere that pt overdosed on Ativan a few weeks ago. Wife clarified that this happened about 3 years ago and he was admitted to [facility]. Per wife, pt has a history of underlying anxiety and the Ativan used to work. But after his overdose, pt was not prescribed it. She also reports he used to self medicate with alcohol. She shared pt has no history of psychosis or Bipolar Disorder. In July, pt was doing better and walking, but has declined since then. She feels the Zyprexa is the wrong medication for him at this  time. Wife did report pt has auditory and visual hallucinations. She would like to speak with psychiatric provider to discuss the following. She also shared her  used to be a psychiatric nurse. Wife stated she can no longer care for patient at home, and is hopeful he will find placement.     Barriers to Discharge: psychiatric/placement    Outpatient provider(s):  none reported  Insurance info/prescription coverage:   MEDICARE PART A AND B     Diagnosis:   Past Medical History:   Diagnosis Date    Arthritis     back and left knee    Dependence on walking stick     bilateral/single hiking stick if walking distance    Gout     Parkinson's disease     S/P deep brain stimulator placement     pt has a remote for this device    Thyroid cancer (HCC)     radioactive iodine 4/2020, and partial thyroidectomy    Uses brace     back brace        Plan:  defer to most recent psychiatric consult note for disposition and recommendation.   Follow up Psych Consult placed? Yes .   Psychiatrist updated? No        Participating treatment team members: Fan Vang, BRITNI BURGER LCSW

## 2024-11-04 NOTE — CARE COORDINATION
Transition of Care Plan:    RUR: 13% (low RUR)   Prior Level of Functioning: Needing some assistance   Disposition: SNF vs FDC/Memory Care  MILES: 11/08  If SNF or IPR: Date FOC offered: 11/02  Date FOC received: Family reviewing  Accepting facility: Pending disposition and choice   Date authorization started with reference number:   Date authorization received and expires:   Follow up appointments: defer to placement  DME needed: defer to placement   Transportation at discharge: Stretcher anticipated   IM/IMM Medicare/ letter given: Last given 10/31  Is patient a  and connected with VA? N/a   If yes, was [place] transfer form completed and VA notified? N/a  Caregiver Contact: Rianna Vang; wife; [phone]  Discharge Caregiver contacted prior to discharge? CM to contact  Care Conference needed? Not at this time   Barriers to discharge: Placement     0942 - Assumed transitions of care planning from [facility]. Chart reviewed. Complex placement anticipated. Noted psych on 11/02 rec [facility] placement. Patient will need stephon-psych bed d/t hx of Parkinsons and level of assistance needed with ADLs. CM contacted [facility] Access Line @ [phone] requesting bed availability. Nursing manager will review and get back to CM.     Patient will need daily psych consults if pursuing placement, CM made RN aware.     Plan A: [facility] geriatric psych placement. May be limited d/t bed availability. Psych to see daily to determine patient still meeting criteria. Per note on 11/02, psych rec contacting [facility] if patient becomes involuntary    Plan B: SNF to LTC   If patient clears psych, CM can pursue SNF to LTC with family. Wife was discussing Medicaid eligibility with [facility]. CM will follow-up on this conversation. SNF may be difficult to secure d/t patient's behaviors    Plan C: Direct FDC/Memory care placement   [facility] working with wife to identify FDC options and feasible budget. CM will follow-up on this conversation. Patient will

## 2024-11-04 NOTE — PROGRESS NOTES
End of Shift Note    Bedside shift change report given to KATYA Cifuentes  (oncoming nurse) by Víctor Coreas RN .        Shift worked:  Days   Shift summary and any significant changes:    Patient up to chair throughout day. Psych consult completed.     Concerns for physician to address:  Days   Zone phone for oncoming shift:  [phone]     Patient Information  Fan Vang  73 y.o.  10/31/2024 10:55 AM by Sheila Solano MD. Fan Vang was admitted from [facility]    Problem List  Patient Active Problem List    Diagnosis Date Noted    Encephalopathy acute 11/01/2024    Depression 11/01/2024    Degeneration of intervertebral disc of lumbar region with discogenic back pain and lower extremity pain 11/01/2024    Confusion and disorientation 10/31/2024    Parkinson's disease (HCC)     Arthritis     S/P deep brain stimulator placement     Gout     Thyroid cancer (HCC)      Past Medical History:   Diagnosis Date    Arthritis     back and left knee    Dependence on walking stick     bilateral/single hiking stick if walking distance    Gout     Parkinson's disease     S/P deep brain stimulator placement     pt has a remote for this device    Thyroid cancer (HCC)     radioactive iodine 4/2020, and partial thyroidectomy    Uses brace     back brace       Core Measures:  CVA: yes  CHF: no  PNA: no    Activity:  Level of Assistance: Maximum assist, patient does 25-49%  Number times ambulated in hallways past shift: 0  Number of times OOB to chair past shift: 0    Cardiac:   Cardiac Monitoring: yes    Access:   Current line(s): PIV    Respiratory:   O2 Device: None (Room air)    GI:  Last BM (including prior to admit): 11/01/24  Current diet:  DIET ONE TIME MESSAGE;  DIET ONE TIME MESSAGE;  ADULT DIET; Regular; Safety Tray; Safety Tray (Disposables)  Tolerating current diet: Yes    Pain Management:   Patient states pain is manageable on current regimen: yes    Skin:  Jude Scale Score: 17  Interventions: dual skin   Pressure injury:

## 2024-11-05 LAB
ALBUMIN SERPL-MCNC: 3.5 G/DL (ref 3.5–5)
ANION GAP SERPL CALC-SCNC: 5 MMOL/L (ref 2–12)
BUN SERPL-MCNC: 23 MG/DL (ref 6–20)
BUN/CREAT SERPL: 29 (ref 12–20)
CALCIUM SERPL-MCNC: 9.4 MG/DL (ref 8.5–10.1)
CHLORIDE SERPL-SCNC: 110 MMOL/L (ref 97–108)
CO2 SERPL-SCNC: 25 MMOL/L (ref 21–32)
CREAT SERPL-MCNC: 0.79 MG/DL (ref 0.7–1.3)
ERYTHROCYTE [DISTWIDTH] IN BLOOD BY AUTOMATED COUNT: 13.1 % (ref 11.5–14.5)
GLUCOSE SERPL-MCNC: 109 MG/DL (ref 65–100)
HCT VFR BLD AUTO: 38.2 % (ref 36.6–50.3)
HGB BLD-MCNC: 12.9 G/DL (ref 12.1–17)
MCH RBC QN AUTO: 31.6 PG (ref 26–34)
MCHC RBC AUTO-ENTMCNC: 33.8 G/DL (ref 30–36.5)
MCV RBC AUTO: 93.6 FL (ref 80–99)
NRBC # BLD: 0 K/UL (ref 0–0.01)
NRBC BLD-RTO: 0 PER 100 WBC
PHOSPHATE SERPL-MCNC: 3.1 MG/DL (ref 2.6–4.7)
PLATELET # BLD AUTO: 282 K/UL (ref 150–400)
PMV BLD AUTO: 9.5 FL (ref 8.9–12.9)
POTASSIUM SERPL-SCNC: 3.4 MMOL/L (ref 3.5–5.1)
RBC # BLD AUTO: 4.08 M/UL (ref 4.1–5.7)
SODIUM SERPL-SCNC: 140 MMOL/L (ref 136–145)
WBC # BLD AUTO: 5.6 K/UL (ref 4.1–11.1)

## 2024-11-05 PROCEDURE — 97116 GAIT TRAINING THERAPY: CPT

## 2024-11-05 PROCEDURE — 1100000003 HC PRIVATE W/ TELEMETRY

## 2024-11-05 PROCEDURE — 97530 THERAPEUTIC ACTIVITIES: CPT

## 2024-11-05 PROCEDURE — 6370000000 HC RX 637 (ALT 250 FOR IP): Performed by: STUDENT IN AN ORGANIZED HEALTH CARE EDUCATION/TRAINING PROGRAM

## 2024-11-05 PROCEDURE — 6370000000 HC RX 637 (ALT 250 FOR IP): Performed by: INTERNAL MEDICINE

## 2024-11-05 PROCEDURE — 6360000002 HC RX W HCPCS: Performed by: INTERNAL MEDICINE

## 2024-11-05 PROCEDURE — 36415 COLL VENOUS BLD VENIPUNCTURE: CPT

## 2024-11-05 PROCEDURE — 97535 SELF CARE MNGMENT TRAINING: CPT

## 2024-11-05 PROCEDURE — 80069 RENAL FUNCTION PANEL: CPT

## 2024-11-05 PROCEDURE — 85027 COMPLETE CBC AUTOMATED: CPT

## 2024-11-05 RX ORDER — POTASSIUM CHLORIDE 1.5 G/1.58G
40 POWDER, FOR SOLUTION ORAL ONCE
Status: COMPLETED | OUTPATIENT
Start: 2024-11-05 | End: 2024-11-05

## 2024-11-05 RX ADMIN — CARBIDOPA, LEVODOPA AND ENTACAPONE 1 TABLET: 31.25; 200; 125 TABLET, FILM COATED ORAL at 08:17

## 2024-11-05 RX ADMIN — POTASSIUM CHLORIDE 40 MEQ: 1.5 FOR SOLUTION ORAL at 09:20

## 2024-11-05 RX ADMIN — LIOTHYRONINE SODIUM 10 MCG: 5 TABLET ORAL at 20:49

## 2024-11-05 RX ADMIN — CARBIDOPA, LEVODOPA AND ENTACAPONE 1 TABLET: 31.25; 200; 125 TABLET, FILM COATED ORAL at 12:11

## 2024-11-05 RX ADMIN — ENOXAPARIN SODIUM 40 MG: 100 INJECTION SUBCUTANEOUS at 08:15

## 2024-11-05 RX ADMIN — PAROXETINE HYDROCHLORIDE 30 MG: 20 TABLET, FILM COATED ORAL at 20:46

## 2024-11-05 RX ADMIN — CARBIDOPA, LEVODOPA AND ENTACAPONE 1 TABLET: 31.25; 200; 125 TABLET, FILM COATED ORAL at 20:54

## 2024-11-05 RX ADMIN — Medication 5000 UNITS: at 08:15

## 2024-11-05 RX ADMIN — CARBIDOPA AND LEVODOPA 0.5 TABLET: 25; 100 TABLET ORAL at 14:34

## 2024-11-05 RX ADMIN — CARBIDOPA AND LEVODOPA 0.5 TABLET: 25; 100 TABLET ORAL at 20:47

## 2024-11-05 RX ADMIN — LEVOTHYROXINE SODIUM 25 MCG: 0.03 TABLET ORAL at 20:46

## 2024-11-05 RX ADMIN — CARBIDOPA, LEVODOPA AND ENTACAPONE 1 TABLET: 31.25; 200; 125 TABLET, FILM COATED ORAL at 14:34

## 2024-11-05 RX ADMIN — CARBIDOPA, LEVODOPA AND ENTACAPONE 1 TABLET: 31.25; 200; 125 TABLET, FILM COATED ORAL at 09:37

## 2024-11-05 RX ADMIN — MIRTAZAPINE 45 MG: 15 TABLET, FILM COATED ORAL at 20:48

## 2024-11-05 RX ADMIN — CARBIDOPA AND LEVODOPA 1 TABLET: 50; 200 TABLET, EXTENDED RELEASE ORAL at 05:26

## 2024-11-05 RX ADMIN — CARBIDOPA AND LEVODOPA 1 TABLET: 25; 100 TABLET ORAL at 16:08

## 2024-11-05 RX ADMIN — LIOTHYRONINE SODIUM 10 MCG: 5 TABLET ORAL at 08:16

## 2024-11-05 RX ADMIN — Medication: at 20:53

## 2024-11-05 RX ADMIN — ACETAMINOPHEN 1000 MG: 500 TABLET ORAL at 14:33

## 2024-11-05 RX ADMIN — CARBIDOPA AND LEVODOPA 1 TABLET: 50; 200 TABLET, EXTENDED RELEASE ORAL at 20:53

## 2024-11-05 RX ADMIN — ACETAMINOPHEN 650 MG: 325 TABLET ORAL at 05:25

## 2024-11-05 RX ADMIN — IBUPROFEN 400 MG: 400 TABLET, FILM COATED ORAL at 17:46

## 2024-11-05 RX ADMIN — CARBIDOPA, LEVODOPA AND ENTACAPONE 1 TABLET: 31.25; 200; 125 TABLET, FILM COATED ORAL at 17:46

## 2024-11-05 RX ADMIN — CARBIDOPA AND LEVODOPA 0.5 TABLET: 25; 100 TABLET ORAL at 08:16

## 2024-11-05 RX ADMIN — IBUPROFEN 400 MG: 400 TABLET, FILM COATED ORAL at 12:11

## 2024-11-05 RX ADMIN — CARBIDOPA AND LEVODOPA 0.5 TABLET: 25; 100 TABLET ORAL at 02:33

## 2024-11-05 RX ADMIN — Medication: at 08:25

## 2024-11-05 RX ADMIN — DOCUSATE SODIUM 100 MG: 100 CAPSULE, LIQUID FILLED ORAL at 20:48

## 2024-11-05 RX ADMIN — IBUPROFEN 400 MG: 400 TABLET, FILM COATED ORAL at 08:16

## 2024-11-05 RX ADMIN — Medication 10.5 MG: at 20:49

## 2024-11-05 RX ADMIN — CARBIDOPA, LEVODOPA AND ENTACAPONE 1 TABLET: 31.25; 200; 125 TABLET, FILM COATED ORAL at 16:08

## 2024-11-05 RX ADMIN — Medication 3 TABLET: at 08:17

## 2024-11-05 RX ADMIN — ACETAMINOPHEN 1000 MG: 500 TABLET ORAL at 20:45

## 2024-11-05 RX ADMIN — LEVOTHYROXINE SODIUM 25 MCG: 0.03 TABLET ORAL at 08:15

## 2024-11-05 RX ADMIN — CARBIDOPA AND LEVODOPA 1 TABLET: 50; 200 TABLET, EXTENDED RELEASE ORAL at 02:34

## 2024-11-05 ASSESSMENT — PAIN SCALES - GENERAL
PAINLEVEL_OUTOF10: 5
PAINLEVEL_OUTOF10: 0
PAINLEVEL_OUTOF10: 0
PAINLEVEL_OUTOF10: 3
PAINLEVEL_OUTOF10: 3
PAINLEVEL_OUTOF10: 5
PAINLEVEL_OUTOF10: 0
PAINLEVEL_OUTOF10: 3
PAINLEVEL_OUTOF10: 3
PAINLEVEL_OUTOF10: 0

## 2024-11-05 ASSESSMENT — PAIN DESCRIPTION - PAIN TYPE
TYPE: CHRONIC PAIN

## 2024-11-05 ASSESSMENT — PAIN DESCRIPTION - DESCRIPTORS
DESCRIPTORS: ACHING

## 2024-11-05 ASSESSMENT — PAIN DESCRIPTION - FREQUENCY
FREQUENCY: INTERMITTENT

## 2024-11-05 ASSESSMENT — PAIN DESCRIPTION - ORIENTATION
ORIENTATION: LOWER

## 2024-11-05 ASSESSMENT — PAIN DESCRIPTION - ONSET
ONSET: ON-GOING

## 2024-11-05 ASSESSMENT — PAIN DESCRIPTION - LOCATION
LOCATION: BACK
LOCATION: BACK;HIP
LOCATION: BACK
LOCATION: BACK

## 2024-11-05 ASSESSMENT — PAIN - FUNCTIONAL ASSESSMENT
PAIN_FUNCTIONAL_ASSESSMENT: ACTIVITIES ARE NOT PREVENTED
PAIN_FUNCTIONAL_ASSESSMENT: ACTIVITIES ARE NOT PREVENTED
PAIN_FUNCTIONAL_ASSESSMENT: PREVENTS OR INTERFERES SOME ACTIVE ACTIVITIES AND ADLS
PAIN_FUNCTIONAL_ASSESSMENT: ACTIVITIES ARE NOT PREVENTED

## 2024-11-05 NOTE — PLAN OF CARE
Problem: Occupational Therapy - Adult  Goal: By Discharge: Performs self-care activities at highest level of function for planned discharge setting.  See evaluation for individualized goals.  Description: FUNCTIONAL STATUS PRIOR TO ADMISSION:  Patient questionable historian but reported he was independent in ADLs and functional mobility. Per chart review, CM noted reported patient was crab walking around his house and required assist from wife for ADLs.   Receives Help From: Family, Neighbor, Personal care attendant, ADL Assistance: Needs assistance,  ,  ,  ,  ,  , Homemaking Assistance: Needs assistance, Ambulation Assistance: Needs assistance, Transfer Assistance: Independent, Active : No     HOME SUPPORT: Patient lived with wife.    Occupational Therapy Goals:  Initiated 11/1/2024  1.  Patient will perform grooming with Supervision within 7 day(s).  2.  Patient will perform upper body dressing with Supervision within 7 day(s).  3.  Patient will perform lower body dressing with Supervision within 7 day(s).  4.  Patient will perform toilet transfers to [facility] with Minimal Assist  within 7 day(s).  5.  Patient will perform all aspects of toileting with Minimal Assist within 7 day(s).  6.  Patient will participate in upper extremity therapeutic exercise/activities with Supervision for 5 minutes within 7 day(s).    Outcome: Progressing   OCCUPATIONAL THERAPY TREATMENT  Patient: Fan Vang (73 y.o. male)  Date: 11/5/2024  Primary Diagnosis: Essential hypertension [I10]  Encephalopathy acute [G93.40]  Confusion and disorientation [R41.0]  Depression, unspecified depression type [F32.A]  Parkinson's disease with dyskinesia, unspecified whether manifestations fluctuate (HCC) [G20.B1]       Precautions: Fall Risk, Bed Alarm                Chart, occupational therapy assessment, plan of care, and goals were reviewed.    ASSESSMENT  Patient continues to benefit from skilled OT services and is slowly progressing

## 2024-11-05 NOTE — PROGRESS NOTES
End of Shift Note    Bedside shift change report given to  Víctor RN  (oncoming nurse) by RADHA VILLEGAS RN .        Shift worked:  7p-7a   Shift summary and any significant changes:    Patient continues to have back pain, scheduled pain medications given. Patient reports satisfaction with pain management.     Concerns for physician to address: none   Zone phone for oncoming shift:  [phone]     Patient Information  Fan Vang  73 y.o.  10/31/2024 10:55 AM by Sheila Solano MD. Fan Vang was admitted from [facility]    Problem List  Patient Active Problem List    Diagnosis Date Noted    Encephalopathy acute 11/01/2024    Depression 11/01/2024    Degeneration of intervertebral disc of lumbar region with discogenic back pain and lower extremity pain 11/01/2024    Confusion and disorientation 10/31/2024    Parkinson's disease (HCC)     Arthritis     S/P deep brain stimulator placement     Gout     Thyroid cancer (HCC)      Past Medical History:   Diagnosis Date    Arthritis     back and left knee    Dependence on walking stick     bilateral/single hiking stick if walking distance    Gout     Parkinson's disease     S/P deep brain stimulator placement     pt has a remote for this device    Thyroid cancer (HCC)     radioactive iodine 4/2020, and partial thyroidectomy    Uses brace     back brace       Core Measures:  CVA: yes  CHF: no  PNA: no    Activity:  Level of Assistance: Maximum assist, patient does 25-49%  Number times ambulated in hallways past shift: 0  Number of times OOB to chair past shift: 0    Cardiac:   Cardiac Monitoring: yes    Access:   Current line(s): PIV    Respiratory:   O2 Device: None (Room air)    GI:  Last BM (including prior to admit): 11/04/24  Current diet:  DIET ONE TIME MESSAGE;  DIET ONE TIME MESSAGE;  ADULT DIET; Regular; Safety Tray; Safety Tray (Disposables)  Tolerating current diet: Yes    Pain Management:   Patient states pain is manageable on current regimen: yes    Skin:  Jude

## 2024-11-05 NOTE — CARE COORDINATION
Transition of Care Plan:     RUR: 13% (low RUR)   Prior Level of Functioning: Needing some assistance   Disposition: SNF vs skilled nursing/Memory Care  MILES: 11/08  If SNF or IPR: Date FOC offered: 11/02  Date FOC received: Family reviewing  Accepting facility: Pending disposition and choice   Date authorization started with reference number:   Date authorization received and expires:   Follow up appointments: defer to placement  DME needed: defer to placement   Transportation at discharge: Stretcher anticipated   IM/IMM Medicare/ letter given: Last given 10/31  Is patient a [place] and connected with VA? N/a              If yes, was  transfer form completed and VA notified? N/a  Caregiver Contact: Rianna Vang; wife; [phone]  Discharge Caregiver contacted prior to discharge? CM to contact  Care Conference needed? Not at this time   Barriers to discharge: Placement    0845 - Chart reviewed. Plans A and B outlined below. Plan for patient to work with therapy today with wife present. Will follow for recs and send SNF referrals.     Plan A: SNF with transition to skilled nursing, gaining more time for patient's wife to choose a memory care placement.   Plan B: Direct d/c to [facility] if SNF cannot be obtained.     1335 - Wife provided additional choices:  1) [facility]  2) [facility]  3) [facility]  4) [facility]  5) [facility]  6) [facility]     CM will place referrals once PT/OT notes available.     1350 - Referrals placed.     HORACIO Bhatti  Care Management  [facility]  [phone]

## 2024-11-05 NOTE — PLAN OF CARE
Problem: Physical Therapy - Adult  Goal: By Discharge: Performs mobility at highest level of function for planned discharge setting.  See evaluation for individualized goals.  Description: FUNCTIONAL STATUS PRIOR TO ADMISSION: Pt is a poor historian but reports he mainly uses wheelchair.    HOME SUPPORT PRIOR TO ADMISSION: The patient lived with wife.    Physical Therapy Goals  Initiated 11/1/2024  1.  Patient will move from supine to sit and sit to supine in bed with supervision/set-up within 7 day(s).    2.  Patient will perform sit to stand with supervision/set-up within 7 day(s).  3.  Patient will transfer from bed to chair and chair to bed with supervision/set-up using the least restrictive device within 7 day(s).      Outcome: Progressing   PHYSICAL THERAPY TREATMENT    Patient: Fan Vang (73 y.o. male)  Date: 11/5/2024  Diagnosis: Essential hypertension [I10]  Encephalopathy acute [G93.40]  Confusion and disorientation [R41.0]  Depression, unspecified depression type [F32.A]  Parkinson's disease with dyskinesia, unspecified whether manifestations fluctuate (HCC) [G20.B1] Confusion and disorientation      Precautions: Fall Risk, Bed Alarm                      ASSESSMENT:  Patient continues to benefit from skilled PT services and is slowly progressing towards goals. Pt was received in supine with wife at bedside and cleared by nursing to mobilize. Pt more directable today and able to mobilize to the EOB and don shoes/socks with increased cues. Stood with bilateral HHA. Ambulated around the bed while furniture walking and fatigued quickly needing a seated rest break half way to the recliner.          PLAN:  Patient continues to benefit from skilled intervention to address the above impairments.  Continue treatment per established plan of care.        Recommendation for discharge: (in order for the patient to meet his/her long term goals):   Moderate intensity short-term skilled physical therapy up to

## 2024-11-05 NOTE — PROGRESS NOTES
End of Shift Note    Bedside shift change report given to Vero ROCHA  (oncoming nurse) by Víctor Coreas RN .        Shift worked:  Days   Shift summary and any significant changes:    Patient up to chair throughout day. No significant changes.   Concerns for physician to address:  Days   Zone phone for oncoming shift:  [phone]     Patient Information  Fan Vang  73 y.o.  10/31/2024 10:55 AM by Sheila Solano MD. Fan Vang was admitted from [facility]    Problem List  Patient Active Problem List    Diagnosis Date Noted    Encephalopathy acute 11/01/2024    Depression 11/01/2024    Degeneration of intervertebral disc of lumbar region with discogenic back pain and lower extremity pain 11/01/2024    Confusion and disorientation 10/31/2024    Parkinson's disease (HCC)     Arthritis     S/P deep brain stimulator placement     Gout     Thyroid cancer (HCC)      Past Medical History:   Diagnosis Date    Arthritis     back and left knee    Dependence on walking stick     bilateral/single hiking stick if walking distance    Gout     Parkinson's disease     S/P deep brain stimulator placement     pt has a remote for this device    Thyroid cancer (HCC)     radioactive iodine 4/2020, and partial thyroidectomy    Uses brace     back brace       Core Measures:  CVA: yes  CHF: no  PNA: no    Activity:  Level of Assistance: Maximum assist, patient does 25-49%  Number times ambulated in hallways past shift: 0  Number of times OOB to chair past shift: 0    Cardiac:   Cardiac Monitoring: yes    Access:   Current line(s): PIV    Respiratory:   O2 Device: None (Room air)    GI:  Last BM (including prior to admit): 11/04/24  Current diet:  ADULT DIET; Regular  Tolerating current diet: Yes    Pain Management:   Patient states pain is manageable on current regimen: yes    Skin:  Jude Scale Score: 17  Interventions: dual skin   Pressure injury: no    Patient Safety:  Fall Score: Flores Total Score: 80  Interventions: stay with me

## 2024-11-05 NOTE — PROGRESS NOTES
Vitals for the past 24 hrs:   BP Temp Temp src Pulse Resp SpO2   11/05/24 0812 (!) 151/101 98.6 °F (37 °C) Oral 75 18 99 %   11/04/24 1950 125/76 98.2 °F (36.8 °C) -- 57 16 98 %       No intake or output data in the 24 hours ending 11/05/24 0902       I had a face to face encounter and independently examined this patient on 11/5/2024, as outlined below:  PHYSICAL EXAM:  General: Alert, cooperative, word-finding difficulty, fast speech  EENT:  EOMI. Anicteric sclerae.  Resp:  CTA bilaterally, no wheezing or rales.  No accessory muscle use  CV:  Regular  rhythm,  No edema  GI:  Soft, Non distended, Non tender.  +Bowel sounds  Neurologic:  Alert and oriented X 3, normal speech,   Psych:   Good insight. Not anxious nor agitated  Skin:  No rashes.  No jaundice    Reviewed most current lab test results and cultures  YES  Reviewed most current radiology test results   YES  Review and summation of old records today    NO  Reviewed patient's current orders and MAR    YES  PMH/SH reviewed - no change compared to H&P    Procedures: see electronic medical records for all procedures/Xrays and details which were not copied into this note but were reviewed prior to creation of Plan.      LABS:  I reviewed today's most current labs and imaging studies.  Pertinent labs include:  Recent Labs     11/03/24 0223 11/05/24  0547   WBC 6.6 5.6   HGB 12.3 12.9   HCT 36.8 38.2    282     Recent Labs     11/03/24 0223 11/04/24  0359 11/05/24  0547    140 140   K 3.7 3.7 3.4*   * 111* 110*   CO2 26 27 25   GLUCOSE 90 89 109*   BUN 21* 19 23*   CREATININE 0.75 0.73 0.79   CALCIUM 9.1 9.2 9.4   MG 2.0  --   --    PHOS 3.8 3.9 3.1       Signed: Irlanda Pablo MD

## 2024-11-06 VITALS
SYSTOLIC BLOOD PRESSURE: 160 MMHG | TEMPERATURE: 97.3 F | OXYGEN SATURATION: 97 % | BODY MASS INDEX: 22.9 KG/M2 | HEIGHT: 70 IN | HEART RATE: 63 BPM | WEIGHT: 160 LBS | RESPIRATION RATE: 18 BRPM | DIASTOLIC BLOOD PRESSURE: 80 MMHG

## 2024-11-06 PROCEDURE — 6360000002 HC RX W HCPCS: Performed by: INTERNAL MEDICINE

## 2024-11-06 PROCEDURE — 6370000000 HC RX 637 (ALT 250 FOR IP): Performed by: STUDENT IN AN ORGANIZED HEALTH CARE EDUCATION/TRAINING PROGRAM

## 2024-11-06 PROCEDURE — 6370000000 HC RX 637 (ALT 250 FOR IP): Performed by: INTERNAL MEDICINE

## 2024-11-06 RX ADMIN — ENOXAPARIN SODIUM 40 MG: 100 INJECTION SUBCUTANEOUS at 08:20

## 2024-11-06 RX ADMIN — IBUPROFEN 400 MG: 400 TABLET, FILM COATED ORAL at 08:22

## 2024-11-06 RX ADMIN — CARBIDOPA AND LEVODOPA 0.5 TABLET: 25; 100 TABLET ORAL at 08:21

## 2024-11-06 RX ADMIN — Medication 5000 UNITS: at 08:23

## 2024-11-06 RX ADMIN — CARBIDOPA, LEVODOPA AND ENTACAPONE 1 TABLET: 31.25; 200; 125 TABLET, FILM COATED ORAL at 08:23

## 2024-11-06 RX ADMIN — CARBIDOPA, LEVODOPA AND ENTACAPONE 1 TABLET: 31.25; 200; 125 TABLET, FILM COATED ORAL at 12:00

## 2024-11-06 RX ADMIN — ACETAMINOPHEN 1000 MG: 500 TABLET ORAL at 08:21

## 2024-11-06 RX ADMIN — CARBIDOPA AND LEVODOPA 0.5 TABLET: 25; 100 TABLET ORAL at 01:02

## 2024-11-06 RX ADMIN — CARBIDOPA AND LEVODOPA 1 TABLET: 50; 200 TABLET, EXTENDED RELEASE ORAL at 01:01

## 2024-11-06 RX ADMIN — CARBIDOPA, LEVODOPA AND ENTACAPONE 1 TABLET: 31.25; 200; 125 TABLET, FILM COATED ORAL at 10:00

## 2024-11-06 RX ADMIN — DOCUSATE SODIUM 100 MG: 100 CAPSULE, LIQUID FILLED ORAL at 08:23

## 2024-11-06 RX ADMIN — Medication: at 08:24

## 2024-11-06 RX ADMIN — ACETAMINOPHEN 650 MG: 325 TABLET ORAL at 03:29

## 2024-11-06 RX ADMIN — Medication 3 TABLET: at 08:20

## 2024-11-06 RX ADMIN — CARBIDOPA AND LEVODOPA 0.5 TABLET: 25; 100 TABLET ORAL at 13:00

## 2024-11-06 RX ADMIN — LEVOTHYROXINE SODIUM 25 MCG: 0.03 TABLET ORAL at 08:20

## 2024-11-06 RX ADMIN — IBUPROFEN 400 MG: 400 TABLET, FILM COATED ORAL at 11:10

## 2024-11-06 RX ADMIN — CARBIDOPA AND LEVODOPA 1 TABLET: 50; 200 TABLET, EXTENDED RELEASE ORAL at 04:34

## 2024-11-06 RX ADMIN — HYDRALAZINE HYDROCHLORIDE 10 MG: 20 INJECTION, SOLUTION INTRAMUSCULAR; INTRAVENOUS at 01:08

## 2024-11-06 RX ADMIN — CARBIDOPA, LEVODOPA AND ENTACAPONE 1 TABLET: 31.25; 200; 125 TABLET, FILM COATED ORAL at 14:49

## 2024-11-06 RX ADMIN — LIOTHYRONINE SODIUM 10 MCG: 5 TABLET ORAL at 08:22

## 2024-11-06 ASSESSMENT — PAIN SCALES - GENERAL
PAINLEVEL_OUTOF10: 0
PAINLEVEL_OUTOF10: 8

## 2024-11-06 ASSESSMENT — PAIN DESCRIPTION - ORIENTATION: ORIENTATION: LEFT

## 2024-11-06 ASSESSMENT — PAIN DESCRIPTION - DESCRIPTORS: DESCRIPTORS: ACHING;SHARP

## 2024-11-06 ASSESSMENT — PAIN DESCRIPTION - LOCATION
LOCATION: HIP
LOCATION: OTHER (COMMENT)

## 2024-11-06 NOTE — DISCHARGE INSTRUCTIONS
HOSPITALIST DISCHARGE INSTRUCTIONS    NAME: Fan Vang   :  1950   MRN:  [id number]     Date/Time:  2024 12:41 PM    ADMIT DATE: 10/31/2024     DISCHARGE DATE: 2024     DISCHARGE DIAGNOSIS:  Confusional state and expressive aphasia, Intermittent   Severe Parkinson, progressive Parkinson disease  Status post deep brain stimulator in place  Chronic Back pain with Degenerative spine disease  Major depressive disorder, severe, recurrent  Insomnia  Thyroid cancer s/p radiation  Hypothyroidism  Full code    MEDICATIONS:  As per medication reconciliation  list  It is important that you take the medication exactly as they are prescribed.   Keep your medication in the bottles provided by the pharmacist and keep a list of the medication names, dosages, and times to be taken in your wallet.   Do not take other medications without consulting your doctor.     Pain Management: per above medications    What to do at Home    Recommended diet:  regular diet    Recommended activity: activity as tolerated    If you have questions regarding the hospital related prescriptions or hospital related issues please call at .    If you experience any of the following symptoms then please call your primary care physician or return to the emergency room if you cannot get hold of your doctor:  Fever, chills, nausea, vomiting, diarrhea, change in mentation, falling, bleeding, shortness of breath,     Follow Up:  [facility]  Your PCP  you are to call and set up an appointment to see them in 7-10 days.      Information obtained by :  I understand that if any problems occur once I am at home I am to contact my physician.    I understand and acknowledge receipt of the instructions indicated above.                                                                                                                                           Physician's or R.N.'s Signature

## 2024-11-06 NOTE — PLAN OF CARE
Problem: Safety - Adult  Goal: Free from fall injury  11/6/2024 1149 by Saundra Garcia RN  Outcome: Adequate for Discharge  11/6/2024 0729 by Saundra Garcia RN  Outcome: Progressing  11/5/2024 2205 by Vero Tam LPN  Outcome: Progressing     Problem: Discharge Planning  Goal: Discharge to home or other facility with appropriate resources  11/6/2024 1149 by Saundra Garcia RN  Outcome: Adequate for Discharge  11/6/2024 0729 by Saundra Garcia RN  Outcome: Progressing  11/5/2024 2205 by Vero Tam LPN  Outcome: Progressing     Problem: ABCDS Injury Assessment  Goal: Absence of physical injury  11/6/2024 1149 by Saundra Garcia RN  Outcome: Adequate for Discharge  11/5/2024 2205 by Vero Tam LPN  Outcome: Progressing     Problem: Skin/Tissue Integrity  Goal: Absence of new skin breakdown  Description: 1.  Monitor for areas of redness and/or skin breakdown  2.  Assess vascular access sites hourly  3.  Every 4-6 hours minimum:  Change oxygen saturation probe site  4.  Every 4-6 hours:  If on nasal continuous positive airway pressure, respiratory therapy assess nares and determine need for appliance change or resting period.  11/6/2024 1149 by Saundra Garcia RN  Outcome: Adequate for Discharge  11/5/2024 2205 by Vero Tam LPN  Outcome: Progressing     Problem: Neurosensory - Adult  Goal: Achieves maximal functionality and self care  11/6/2024 1149 by Saundra Garcia RN  Outcome: Adequate for Discharge  11/5/2024 2205 by Vero Tam LPN  Outcome: Progressing     Problem: Skin/Tissue Integrity - Adult  Goal: Skin integrity remains intact  11/6/2024 1149 by Saundra Garcia RN  Outcome: Adequate for Discharge  11/5/2024 2205 by Vero Tam LPN  Outcome: Progressing     Problem: Musculoskeletal - Adult  Goal: Return mobility to safest level of function  11/6/2024 1149 by Saundra Garcia RN  Outcome: Adequate for Discharge  11/5/2024 2205 by Vero Tam

## 2024-11-06 NOTE — PROGRESS NOTES
Attempted to call report to [facility], left message for admissions nurse to jacqui me back to receive report

## 2024-11-06 NOTE — CARE COORDINATION
SNF - [facility]. Bed # 315, call report to [phone]. [facility] 1500.    Transition of Care Plan:     RUR: 13% (low RUR)   Prior Level of Functioning: Needing some assistance   Disposition: SNF @ [facility]  MILES: 11/08  If SNF or IPR: Date FOC offered: 11/02  Date FOC received: 11/05  Accepting facility: [facility]  Date authorization started with reference number:   Date authorization received and expires:   Follow up appointments: defer to placement  DME needed: defer to placement   Transportation at discharge: [facility] requested for 1500  IM/IMM Medicare/ letter given: Last given 11/06  Is patient a  and connected with [facility]? N/a              If yes, was  transfer form completed and VA notified? N/a  Caregiver Contact: Rianna Vang; wife; [phone]  Discharge Caregiver contacted prior to discharge? CM to contact  Care Conference needed? Not at this time   Barriers to discharge: Bed info    0910 - Chart reviewed. SNF referrals sent yesterday:    1) [facility] - Accepted, inquired if bed available   2) [facility] - Denied  3) [facility] - Pending, CM left message this AM  4) [facility] - Denied  5) [facility] - Denied  6) [facility] - Pending, CM left message this AM    Will update family with choices later this morning, hopefully will have heard back from two remaining facilities at that time.     1139 - Wife agreeable to transfer to [facility] today. [facility] requested for 1500. Bed info above. RN aware. Clear from CM for d/c.         Transition of Care Plan to SNF/Rehab    Communication to Patient/Family:  Met with patient and family and they are agreeable to the transition plan. The Plan for Transition of Care is related to the following treatment goals: SNF    The Patient and/or patient representative was provided with a choice of provider and agrees  with the discharge plan.      Yes [x] No []    A Freedom of choice list was provided with basic dialogue  the providers?  Yes         Chantel Canseco, MSW  Care Management  [facility]  [phone]

## 2024-11-06 NOTE — PROGRESS NOTES
End of Shift Note    Bedside shift change report given to KATYA Huang  (oncoming nurse) by Vero Tam LPN .        Shift worked:  Nights   Shift summary and any significant changes:    No acute changes during the night.Prn Tylenol was giving tonight   Concerns for physician to address:  See notes above   Zone phone for oncoming shift:  [phone]     Patient Information  Fan Vang  73 y.o.  10/31/2024 10:55 AM by Sheila Solano MD. Fan Vang was admitted from [facility]    Problem List  Patient Active Problem List    Diagnosis Date Noted    Encephalopathy acute 11/01/2024    Depression 11/01/2024    Degeneration of intervertebral disc of lumbar region with discogenic back pain and lower extremity pain 11/01/2024    Confusion and disorientation 10/31/2024    Parkinson's disease (HCC)     Arthritis     S/P deep brain stimulator placement     Gout     Thyroid cancer (HCC)      Past Medical History:   Diagnosis Date    Arthritis     back and left knee    Dependence on walking stick     bilateral/single hiking stick if walking distance    Gout     Parkinson's disease     S/P deep brain stimulator placement     pt has a remote for this device    Thyroid cancer (HCC)     radioactive iodine 4/2020, and partial thyroidectomy    Uses brace     back brace       Core Measures:  CVA: yes  CHF: no  PNA: no    Activity:  Level of Assistance: Maximum assist, patient does 25-49%  Number times ambulated in hallways past shift: 0  Number of times OOB to chair past shift: 0    Cardiac:   Cardiac Monitoring: yes    Access:   Current line(s): PIV    Respiratory:   O2 Device: None (Room air)    GI:  Last BM (including prior to admit): 11/04/24  Current diet:  ADULT DIET; Regular  Tolerating current diet: Yes    Pain Management:   Patient states pain is manageable on current regimen: yes    Skin:  Jude Scale Score: 17  Interventions: dual skin   Pressure injury: no    Patient Safety:  Fall Score: Flores Total Score: 100  Interventions: stay

## 2024-11-06 NOTE — DISCHARGE SUMMARY
Discharge Summary    Name: Fan Vang  [id number]  YOB: 1950 (Age: 73 y.o.)   Date of Admission: 10/31/2024  Date of Discharge: 11/6/2024  Attending Physician: Stephy Schwab MD    Discharge Diagnosis:   Confusional state and expressive aphasia, Intermittent   Severe Parkinson, progressive Parkinson disease  Status post deep brain stimulator in place  Chronic Back pain with Degenerative spine disease  Major depressive disorder, severe, recurrent  Insomnia  Thyroid cancer s/p radiation  Hypothyroidism  Full code      Consultations:  IP CONSULT TO CASE MANAGEMENT  IP CONSULT TO HOSPITALIST  IP CONSULT TO PHARMACY  IP CONSULT TO NEUROLOGY  IP CONSULT TO PSYCHIATRY  IP CONSULT TO CASE MANAGEMENT  IP CONSULT TO CASE MANAGEMENT  IP CONSULT TO PSYCHIATRY      Brief Admission History/Reason for Admission Per Sheila Solano MD:   \"73 y.o.  male with PMHx significant for severe Parkinson, status post deep brain stimulator placement, thyroid cancer s/p radiation and per showed thyroidectomy who presented to the ED with confusional state and difficulty getting words out.  Patient seems very depressed and frustrated with his Parkinson symptoms.  Discussed with wife at bedside, patient Parkinson symptoms have been getting worse, cannot be left alone and wife is considering long-term placement for the patient  In the ED, he was found to be hypertensive  Lab work showed normal CBC, normal BMP, UA is within normal limit  Ct head wnl  We were asked to admit for work up and evaluation of the above problems.\"    Brief Hospital Course by Main Problems:   Confusional state and expressive aphasia, Intermittent   Severe Parkinson, progressive Parkinson disease  Status post deep brain stimulator in place  Aggressive degenerative spine disease  Major depressive disorder, severe, recurrent     Will continue to monitor to neurofloor, patient has had on and off confusion and expressive  aphasia for few weeks  CT of the head negative, cannot do MRI due to brain stimulator  Normal  B12 folic acid ammonia and TSH  Consult neurology, appreciate recommendations  Resume Parkinson meds  Patient seen by the [facility] neurology  Physical therapy and Occupational Therapy recommended: Memory care  Patient agree for going to memory care,  working on that psychiatry consult:  -Recommend suicide precautions\"   --CM consult for placement      Hypokalemia   KCL 40 x 1              Thyroid cancer s/p radiation  Hypothyroidism  TSH 11.90, free ,T4   0.5  --Levothyroxine 25 mg twice daily, levothyroxine 10 twice daily, confirmed the medication dosage with wife           Insomnia and depression  Continue with Paxil, melatonin     Chronic back pain  Takes Advil    Discharge Exam:  Patient seen and examined by me on discharge day.  Pertinent Findings:  Patient Vitals for the past 24 hrs:   BP Temp Temp src Pulse Resp SpO2   11/06/24 0329 (!) 160/80 -- -- -- -- --   11/06/24 0107 (!) 172/97 -- -- 63 -- --   11/05/24 2041 (!) 170/92 97.3 °F (36.3 °C) Oral 65 18 97 %       Gen:    Not in distress  Chest: Clear lungs  CVS:   Regular rhythm.  No edema  Abd:  Soft, not distended, not tender  Neuro: awake, moving all exts    Discharge/Recent Laboratory Results:  Recent Labs     11/05/24  0547      K 3.4*   *   CO2 25   BUN 23*   CREATININE 0.79   GLUCOSE 109*   CALCIUM 9.4   PHOS 3.1     Recent Labs     11/05/24  0547   HGB 12.9   HCT 38.2   WBC 5.6          Discharge Medications:     Medication List        CONTINUE taking these medications      acetaminophen 500 MG tablet  Commonly known as: TYLENOL     * carbidopa-levodopa  MG per tablet  Commonly known as: SINEMET     * carbidopa-levodopa  MG per extended release tablet  Commonly known as: SINEMET CR     carbidopa-levodopa-entacapone 31.-200 MG Tabs  Commonly known as: STALEVO 125     diclofenac 75 MG EC tablet  Commonly known as:

## 2024-11-06 NOTE — PROGRESS NOTES
Report given to Edison at [facility]. [facility] to be picking patient up this afternoon for transport.

## 2024-11-06 NOTE — PLAN OF CARE
Problem: Safety - Adult  Goal: Free from fall injury  11/6/2024 0729 by Saundra Garcia RN  Outcome: Progressing  11/5/2024 2205 by Vero Tam LPN  Outcome: Progressing     Problem: Discharge Planning  Goal: Discharge to home or other facility with appropriate resources  11/6/2024 0729 by Saundra Garcia RN  Outcome: Progressing  11/5/2024 2205 by Vero Tam LPN  Outcome: Progressing

## 2024-11-06 NOTE — CARE COORDINATION
Medicare 3 night in-pt stay requirement for SNF met 11/3/24.    HORACIO Martinez  [facility]   [phone]

## 2024-11-06 NOTE — PLAN OF CARE
Problem: Safety - Adult  Goal: Free from fall injury  11/5/2024 2205 by Vero Tam LPN  Outcome: Progressing  11/5/2024 0835 by Víctor Coreas RN  Outcome: Progressing     Problem: Discharge Planning  Goal: Discharge to home or other facility with appropriate resources  11/5/2024 2205 by Vero Tam LPN  Outcome: Progressing  11/5/2024 0835 by Víctor Coreas RN  Outcome: Progressing     Problem: ABCDS Injury Assessment  Goal: Absence of physical injury  11/5/2024 2205 by Vero Tam LPN  Outcome: Progressing  11/5/2024 0835 by Víctor Coreas RN  Outcome: Progressing     Problem: Skin/Tissue Integrity  Goal: Absence of new skin breakdown  Description: 1.  Monitor for areas of redness and/or skin breakdown  2.  Assess vascular access sites hourly  3.  Every 4-6 hours minimum:  Change oxygen saturation probe site  4.  Every 4-6 hours:  If on nasal continuous positive airway pressure, respiratory therapy assess nares and determine need for appliance change or resting period.  11/5/2024 2205 by Vero Tam LPN  Outcome: Progressing  11/5/2024 0835 by Víctor Coreas RN  Outcome: Progressing     Problem: Neurosensory - Adult  Goal: Achieves maximal functionality and self care  11/5/2024 2205 by Vero Tam LPN  Outcome: Progressing  11/5/2024 0835 by Víctor Coreas RN  Outcome: Progressing     Problem: Skin/Tissue Integrity - Adult  Goal: Skin integrity remains intact  11/5/2024 2205 by Vero Tam LPN  Outcome: Progressing  11/5/2024 0835 by Víctor Coreas RN  Outcome: Progressing     Problem: Musculoskeletal - Adult  Goal: Return mobility to safest level of function  11/5/2024 2205 by Vero Tam LPN  Outcome: Progressing  11/5/2024 0835 by Víctor Coreas RN  Outcome: Progressing  Goal: Return ADL status to a safe level of function  11/5/2024 2205 by Vero Tam LPN  Outcome: Progressing  11/5/2024 0835 by Víctor Coreas, RN  Outcome: Progressing

## 2024-11-07 ENCOUNTER — CARE COORDINATION (OUTPATIENT)
Dept: CARE COORDINATION | Age: 74
End: 2024-11-07

## 2024-11-11 NOTE — PROGRESS NOTES
Physician Progress Note      PATIENT:               JOSE HUMPHRIES  CSN #:                  [id number]  :                       1950  ADMIT DATE:       10/31/2024 10:55 AM  DISCH DATE:        2024 3:50 PM  RESPONDING  PROVIDER #:        Stephy Schwab MD          QUERY TEXT:    Pt admitted with  confusional state.    Pt noted to have Parkinson's  and    depression.  If possible, please document in progress notes and discharge   summary the relationship, if any, between .    The medical record reflects the following:  Risk Factors: progressive  decline  overall;   longstanding history of   Parkinson's disease, severe degenerative spine disease  Clinical Indicators: CT  negative.   MDD;  neuro-  Decreased mobility, increasing agitation and frustration.  Parkinson   disease:  Progressively worsening with development of behavioral features    Treatment: neuro, Psych  consults; Continue current doses of   carbidopa/levodopa and Stalevo which he has been taking at home    Thank you  very  much  Options provided:  -- Confusional state  possibly  due to   progressive  Parkinson's  -- Confusional state  possibly  due  to  depression  -- Confusional  state  possibly  due to other  cause##  please  document    cause, please  document  cause  -- Other - I will add my own diagnosis  -- Disagree - Not applicable / Not valid  -- Disagree - Clinically unable to determine / Unknown  -- Refer to Clinical Documentation Reviewer    PROVIDER RESPONSE TEXT:    This patient has Confusional state possibly due to progressive Parkinson's    Query created by: Kamla Pratt on 2024 12:10 PM      Electronically signed by:  Stephy Schwab MD 2024 9:19 AM

## 2024-12-08 ENCOUNTER — APPOINTMENT (OUTPATIENT)
Facility: HOSPITAL | Age: 74
DRG: 535 | End: 2024-12-08
Payer: MEDICARE

## 2024-12-08 ENCOUNTER — HOSPITAL ENCOUNTER (INPATIENT)
Facility: HOSPITAL | Age: 74
LOS: 4 days | Discharge: NURSING, REHAB OR LONG-TERM CARE | DRG: 535 | End: 2024-12-12
Attending: EMERGENCY MEDICINE | Admitting: INTERNAL MEDICINE
Payer: MEDICARE

## 2024-12-08 ENCOUNTER — APPOINTMENT (OUTPATIENT)
Facility: HOSPITAL | Age: 74
DRG: 535 | End: 2024-12-08
Attending: EMERGENCY MEDICINE
Payer: MEDICARE

## 2024-12-08 DIAGNOSIS — J81.0 ACUTE PULMONARY EDEMA: ICD-10-CM

## 2024-12-08 DIAGNOSIS — R29.6 RECURRENT FALLS: ICD-10-CM

## 2024-12-08 DIAGNOSIS — D64.9 SYMPTOMATIC ANEMIA: Primary | ICD-10-CM

## 2024-12-08 DIAGNOSIS — R53.1 GENERALIZED WEAKNESS: ICD-10-CM

## 2024-12-08 DIAGNOSIS — S72.002A: ICD-10-CM

## 2024-12-08 DIAGNOSIS — S70.12XA THIGH HEMATOMA, LEFT, INITIAL ENCOUNTER: ICD-10-CM

## 2024-12-08 LAB
ALBUMIN SERPL-MCNC: 2.9 G/DL (ref 3.5–5)
ALBUMIN/GLOB SERPL: 0.9 (ref 1.1–2.2)
ALP SERPL-CCNC: 57 U/L (ref 45–117)
ALT SERPL-CCNC: 11 U/L (ref 12–78)
ANION GAP SERPL CALC-SCNC: 4 MMOL/L (ref 2–12)
AST SERPL-CCNC: 37 U/L (ref 15–37)
BASOPHILS # BLD: 0.1 K/UL (ref 0–0.1)
BASOPHILS NFR BLD: 1 % (ref 0–1)
BILIRUB SERPL-MCNC: 1.7 MG/DL (ref 0.2–1)
BUN SERPL-MCNC: 25 MG/DL (ref 6–20)
BUN/CREAT SERPL: 32 (ref 12–20)
CALCIUM SERPL-MCNC: 8.3 MG/DL (ref 8.5–10.1)
CHLORIDE SERPL-SCNC: 106 MMOL/L (ref 97–108)
CO2 SERPL-SCNC: 29 MMOL/L (ref 21–32)
COMMENT:: NORMAL
CREAT SERPL-MCNC: 0.79 MG/DL (ref 0.7–1.3)
DIFFERENTIAL METHOD BLD: ABNORMAL
EKG ATRIAL RATE: 69 BPM
EKG DIAGNOSIS: NORMAL
EKG P AXIS: 49 DEGREES
EKG P-R INTERVAL: 150 MS
EKG Q-T INTERVAL: 418 MS
EKG QRS DURATION: 92 MS
EKG QTC CALCULATION (BAZETT): 447 MS
EKG R AXIS: 15 DEGREES
EKG T AXIS: 31 DEGREES
EKG VENTRICULAR RATE: 69 BPM
EOSINOPHIL # BLD: 0.1 K/UL (ref 0–0.4)
EOSINOPHIL NFR BLD: 2 % (ref 0–7)
ERYTHROCYTE [DISTWIDTH] IN BLOOD BY AUTOMATED COUNT: 13.7 % (ref 11.5–14.5)
FERRITIN SERPL-MCNC: 121 NG/ML (ref 26–388)
FOLATE SERPL-MCNC: 19.9 NG/ML (ref 5–21)
GLOBULIN SER CALC-MCNC: 3.3 G/DL (ref 2–4)
GLUCOSE SERPL-MCNC: 104 MG/DL (ref 65–100)
HCT VFR BLD AUTO: 20.2 % (ref 36.6–50.3)
HGB BLD-MCNC: 6.6 G/DL (ref 12.1–17)
HISTORY CHECK: NORMAL
IMM GRANULOCYTES # BLD AUTO: 0.1 K/UL (ref 0–0.04)
IMM GRANULOCYTES NFR BLD AUTO: 1 % (ref 0–0.5)
INR PPP: 1 (ref 0.9–1.1)
IRON SATN MFR SERPL: 18 % (ref 20–50)
IRON SERPL-MCNC: 37 UG/DL (ref 35–150)
LACTATE SERPL-SCNC: 1.4 MMOL/L (ref 0.4–2)
LYMPHOCYTES # BLD: 0.9 K/UL (ref 0.8–3.5)
LYMPHOCYTES NFR BLD: 14 % (ref 12–49)
MCH RBC QN AUTO: 32.4 PG (ref 26–34)
MCHC RBC AUTO-ENTMCNC: 32.7 G/DL (ref 30–36.5)
MCV RBC AUTO: 99 FL (ref 80–99)
MONOCYTES # BLD: 0.6 K/UL (ref 0–1)
MONOCYTES NFR BLD: 10 % (ref 5–13)
NEUTS SEG # BLD: 4.4 K/UL (ref 1.8–8)
NEUTS SEG NFR BLD: 72 % (ref 32–75)
NRBC # BLD: 0 K/UL (ref 0–0.01)
NRBC BLD-RTO: 0 PER 100 WBC
NT PRO BNP: 1321 PG/ML
PLATELET # BLD AUTO: 330 K/UL (ref 150–400)
PMV BLD AUTO: 10.3 FL (ref 8.9–12.9)
POTASSIUM SERPL-SCNC: 3.6 MMOL/L (ref 3.5–5.1)
PROT SERPL-MCNC: 6.2 G/DL (ref 6.4–8.2)
PROTHROMBIN TIME: 10.5 SEC (ref 9–11.1)
RBC # BLD AUTO: 2.04 M/UL (ref 4.1–5.7)
RBC MORPH BLD: ABNORMAL
SODIUM SERPL-SCNC: 139 MMOL/L (ref 136–145)
SPECIMEN HOLD: NORMAL
T4 FREE SERPL-MCNC: 0.4 NG/DL (ref 0.8–1.5)
T4 FREE SERPL-MCNC: 0.4 NG/DL (ref 0.8–1.5)
TIBC SERPL-MCNC: 207 UG/DL (ref 250–450)
TROPONIN I SERPL HS-MCNC: 16 NG/L (ref 0–76)
TSH SERPL DL<=0.05 MIU/L-ACNC: 31 UIU/ML (ref 0.36–3.74)
TSH SERPL DL<=0.05 MIU/L-ACNC: 34.8 UIU/ML (ref 0.36–3.74)
VIT B12 SERPL-MCNC: 266 PG/ML (ref 193–986)
WBC # BLD AUTO: 6.2 K/UL (ref 4.1–11.1)

## 2024-12-08 PROCEDURE — 83605 ASSAY OF LACTIC ACID: CPT

## 2024-12-08 PROCEDURE — 86923 COMPATIBILITY TEST ELECTRIC: CPT

## 2024-12-08 PROCEDURE — 0042T CT BRAIN PERFUSION: CPT

## 2024-12-08 PROCEDURE — 85025 COMPLETE CBC W/AUTO DIFF WBC: CPT

## 2024-12-08 PROCEDURE — 84439 ASSAY OF FREE THYROXINE: CPT

## 2024-12-08 PROCEDURE — 93971 EXTREMITY STUDY: CPT

## 2024-12-08 PROCEDURE — 99285 EMERGENCY DEPT VISIT HI MDM: CPT

## 2024-12-08 PROCEDURE — 83540 ASSAY OF IRON: CPT

## 2024-12-08 PROCEDURE — 2580000003 HC RX 258: Performed by: INTERNAL MEDICINE

## 2024-12-08 PROCEDURE — 86901 BLOOD TYPING SEROLOGIC RH(D): CPT

## 2024-12-08 PROCEDURE — 83550 IRON BINDING TEST: CPT

## 2024-12-08 PROCEDURE — 82728 ASSAY OF FERRITIN: CPT

## 2024-12-08 PROCEDURE — 70450 CT HEAD/BRAIN W/O DYE: CPT

## 2024-12-08 PROCEDURE — 86900 BLOOD TYPING SEROLOGIC ABO: CPT

## 2024-12-08 PROCEDURE — 93005 ELECTROCARDIOGRAM TRACING: CPT | Performed by: EMERGENCY MEDICINE

## 2024-12-08 PROCEDURE — 71045 X-RAY EXAM CHEST 1 VIEW: CPT

## 2024-12-08 PROCEDURE — 84484 ASSAY OF TROPONIN QUANT: CPT

## 2024-12-08 PROCEDURE — 6370000000 HC RX 637 (ALT 250 FOR IP)

## 2024-12-08 PROCEDURE — 82746 ASSAY OF FOLIC ACID SERUM: CPT

## 2024-12-08 PROCEDURE — 36415 COLL VENOUS BLD VENIPUNCTURE: CPT

## 2024-12-08 PROCEDURE — 86850 RBC ANTIBODY SCREEN: CPT

## 2024-12-08 PROCEDURE — 6370000000 HC RX 637 (ALT 250 FOR IP): Performed by: INTERNAL MEDICINE

## 2024-12-08 PROCEDURE — 2060000000 HC ICU INTERMEDIATE R&B

## 2024-12-08 PROCEDURE — 84443 ASSAY THYROID STIM HORMONE: CPT

## 2024-12-08 PROCEDURE — 36430 TRANSFUSION BLD/BLD COMPNT: CPT

## 2024-12-08 PROCEDURE — 6360000004 HC RX CONTRAST MEDICATION: Performed by: EMERGENCY MEDICINE

## 2024-12-08 PROCEDURE — 85610 PROTHROMBIN TIME: CPT

## 2024-12-08 PROCEDURE — 70498 CT ANGIOGRAPHY NECK: CPT

## 2024-12-08 PROCEDURE — 82607 VITAMIN B-12: CPT

## 2024-12-08 PROCEDURE — 80053 COMPREHEN METABOLIC PANEL: CPT

## 2024-12-08 PROCEDURE — 83880 ASSAY OF NATRIURETIC PEPTIDE: CPT

## 2024-12-08 PROCEDURE — 4A03X5D MEASUREMENT OF ARTERIAL FLOW, INTRACRANIAL, EXTERNAL APPROACH: ICD-10-PCS | Performed by: EMERGENCY MEDICINE

## 2024-12-08 PROCEDURE — P9016 RBC LEUKOCYTES REDUCED: HCPCS

## 2024-12-08 PROCEDURE — 73700 CT LOWER EXTREMITY W/O DYE: CPT

## 2024-12-08 RX ORDER — CARBIDOPA AND LEVODOPA 25; 100 MG/1; MG/1
1 TABLET ORAL 3 TIMES DAILY
Status: DISCONTINUED | OUTPATIENT
Start: 2024-12-08 | End: 2024-12-12 | Stop reason: HOSPADM

## 2024-12-08 RX ORDER — SODIUM CHLORIDE 9 MG/ML
INJECTION, SOLUTION INTRAVENOUS PRN
Status: DISCONTINUED | OUTPATIENT
Start: 2024-12-08 | End: 2024-12-12 | Stop reason: HOSPADM

## 2024-12-08 RX ORDER — DOCUSATE SODIUM 100 MG/1
100 CAPSULE, LIQUID FILLED ORAL 2 TIMES DAILY
Status: DISCONTINUED | OUTPATIENT
Start: 2024-12-08 | End: 2024-12-12 | Stop reason: HOSPADM

## 2024-12-08 RX ORDER — LIOTHYRONINE SODIUM 5 UG/1
5 TABLET ORAL
Status: DISCONTINUED | OUTPATIENT
Start: 2024-12-08 | End: 2024-12-12 | Stop reason: HOSPADM

## 2024-12-08 RX ORDER — DIVALPROEX SODIUM 125 MG/1
125 TABLET, DELAYED RELEASE ORAL 2 TIMES DAILY
Status: ON HOLD | COMMUNITY
End: 2024-12-12 | Stop reason: HOSPADM

## 2024-12-08 RX ORDER — ASCORBIC ACID 500 MG
500 TABLET ORAL DAILY
Status: DISCONTINUED | OUTPATIENT
Start: 2024-12-08 | End: 2024-12-12 | Stop reason: HOSPADM

## 2024-12-08 RX ORDER — RAMELTEON 8 MG/1
8 TABLET ORAL NIGHTLY
Status: DISCONTINUED | OUTPATIENT
Start: 2024-12-08 | End: 2024-12-12 | Stop reason: HOSPADM

## 2024-12-08 RX ORDER — ONDANSETRON 4 MG/1
4 TABLET, ORALLY DISINTEGRATING ORAL EVERY 8 HOURS PRN
Status: CANCELLED | OUTPATIENT
Start: 2024-12-08

## 2024-12-08 RX ORDER — MELATONIN 10 MG
10 CAPSULE ORAL NIGHTLY
Status: DISCONTINUED | OUTPATIENT
Start: 2024-12-08 | End: 2024-12-08 | Stop reason: SDUPTHER

## 2024-12-08 RX ORDER — DIVALPROEX SODIUM 125 MG/1
125 TABLET, DELAYED RELEASE ORAL 2 TIMES DAILY
Status: DISCONTINUED | OUTPATIENT
Start: 2024-12-08 | End: 2024-12-12 | Stop reason: HOSPADM

## 2024-12-08 RX ORDER — PAROXETINE 20 MG/1
30 TABLET, FILM COATED ORAL NIGHTLY
Status: DISCONTINUED | OUTPATIENT
Start: 2024-12-08 | End: 2024-12-12 | Stop reason: HOSPADM

## 2024-12-08 RX ORDER — PHENYLEPHRINE HCL 10 MG
1 TABLET ORAL NIGHTLY
Status: DISCONTINUED | OUTPATIENT
Start: 2024-12-08 | End: 2024-12-12 | Stop reason: HOSPADM

## 2024-12-08 RX ORDER — CARBIDOPA AND LEVODOPA 50; 200 MG/1; MG/1
1 TABLET, EXTENDED RELEASE ORAL
Status: DISCONTINUED | OUTPATIENT
Start: 2024-12-08 | End: 2024-12-12 | Stop reason: HOSPADM

## 2024-12-08 RX ORDER — NALOXONE HYDROCHLORIDE 0.4 MG/ML
0.4 INJECTION, SOLUTION INTRAMUSCULAR; INTRAVENOUS; SUBCUTANEOUS PRN
Status: DISCONTINUED | OUTPATIENT
Start: 2024-12-08 | End: 2024-12-12 | Stop reason: HOSPADM

## 2024-12-08 RX ORDER — MORPHINE SULFATE 2 MG/ML
2 INJECTION, SOLUTION INTRAMUSCULAR; INTRAVENOUS EVERY 4 HOURS PRN
Status: DISCONTINUED | OUTPATIENT
Start: 2024-12-08 | End: 2024-12-12 | Stop reason: HOSPADM

## 2024-12-08 RX ORDER — LEVOTHYROXINE SODIUM 25 UG/1
25 TABLET ORAL 2 TIMES DAILY
Status: DISCONTINUED | OUTPATIENT
Start: 2024-12-08 | End: 2024-12-12 | Stop reason: HOSPADM

## 2024-12-08 RX ORDER — ENOXAPARIN SODIUM 100 MG/ML
40 INJECTION SUBCUTANEOUS DAILY
Status: CANCELLED | OUTPATIENT
Start: 2024-12-08

## 2024-12-08 RX ORDER — SODIUM CHLORIDE 0.9 % (FLUSH) 0.9 %
5-40 SYRINGE (ML) INJECTION PRN
Status: DISCONTINUED | OUTPATIENT
Start: 2024-12-08 | End: 2024-12-08 | Stop reason: SDUPTHER

## 2024-12-08 RX ORDER — POLYETHYLENE GLYCOL 3350 17 G/17G
17 POWDER, FOR SOLUTION ORAL DAILY PRN
Status: CANCELLED | OUTPATIENT
Start: 2024-12-08

## 2024-12-08 RX ORDER — LIOTHYRONINE SODIUM 5 UG/1
10 TABLET ORAL DAILY
COMMUNITY

## 2024-12-08 RX ORDER — LIOTHYRONINE SODIUM 5 UG/1
10 TABLET ORAL DAILY
Status: DISCONTINUED | OUTPATIENT
Start: 2024-12-09 | End: 2024-12-12 | Stop reason: HOSPADM

## 2024-12-08 RX ORDER — CARBIDOPA, LEVODOPA AND ENTACAPONE 31.25; 200; 125 MG/1; MG/1; MG/1
1 TABLET, FILM COATED ORAL
Status: DISCONTINUED | OUTPATIENT
Start: 2024-12-08 | End: 2024-12-12 | Stop reason: HOSPADM

## 2024-12-08 RX ORDER — SODIUM CHLORIDE 0.9 % (FLUSH) 0.9 %
5-40 SYRINGE (ML) INJECTION EVERY 12 HOURS SCHEDULED
Status: DISCONTINUED | OUTPATIENT
Start: 2024-12-08 | End: 2024-12-12 | Stop reason: HOSPADM

## 2024-12-08 RX ORDER — POLYETHYLENE GLYCOL 3350 17 G/17G
17 POWDER, FOR SOLUTION ORAL DAILY
Status: DISCONTINUED | OUTPATIENT
Start: 2024-12-08 | End: 2024-12-12 | Stop reason: HOSPADM

## 2024-12-08 RX ORDER — SODIUM CHLORIDE 9 MG/ML
INJECTION, SOLUTION INTRAVENOUS PRN
Status: DISCONTINUED | OUTPATIENT
Start: 2024-12-08 | End: 2024-12-08 | Stop reason: SDUPTHER

## 2024-12-08 RX ORDER — SENNA AND DOCUSATE SODIUM 50; 8.6 MG/1; MG/1
1 TABLET, FILM COATED ORAL 2 TIMES DAILY
Status: DISCONTINUED | OUTPATIENT
Start: 2024-12-08 | End: 2024-12-12 | Stop reason: HOSPADM

## 2024-12-08 RX ORDER — ONDANSETRON 2 MG/ML
4 INJECTION INTRAMUSCULAR; INTRAVENOUS EVERY 6 HOURS PRN
Status: DISCONTINUED | OUTPATIENT
Start: 2024-12-08 | End: 2024-12-12 | Stop reason: HOSPADM

## 2024-12-08 RX ORDER — MIRTAZAPINE 15 MG/1
45 TABLET, FILM COATED ORAL NIGHTLY
Status: DISCONTINUED | OUTPATIENT
Start: 2024-12-08 | End: 2024-12-12 | Stop reason: HOSPADM

## 2024-12-08 RX ORDER — VITAMIN B COMPLEX
5000 TABLET ORAL DAILY
Status: DISCONTINUED | OUTPATIENT
Start: 2024-12-09 | End: 2024-12-12 | Stop reason: HOSPADM

## 2024-12-08 RX ORDER — IOPAMIDOL 755 MG/ML
150 INJECTION, SOLUTION INTRAVASCULAR
Status: COMPLETED | OUTPATIENT
Start: 2024-12-08 | End: 2024-12-08

## 2024-12-08 RX ORDER — ONDANSETRON 2 MG/ML
4 INJECTION INTRAMUSCULAR; INTRAVENOUS EVERY 6 HOURS PRN
Status: CANCELLED | OUTPATIENT
Start: 2024-12-08

## 2024-12-08 RX ORDER — ACETAMINOPHEN 500 MG
1000 TABLET ORAL 3 TIMES DAILY PRN
Status: DISCONTINUED | OUTPATIENT
Start: 2024-12-08 | End: 2024-12-12 | Stop reason: HOSPADM

## 2024-12-08 RX ORDER — SODIUM CHLORIDE 0.9 % (FLUSH) 0.9 %
5-40 SYRINGE (ML) INJECTION EVERY 12 HOURS SCHEDULED
Status: DISCONTINUED | OUTPATIENT
Start: 2024-12-08 | End: 2024-12-08 | Stop reason: SDUPTHER

## 2024-12-08 RX ORDER — POLYETHYLENE GLYCOL 3350 17 G/17G
17 POWDER, FOR SOLUTION ORAL DAILY PRN
Status: DISCONTINUED | OUTPATIENT
Start: 2024-12-08 | End: 2024-12-08

## 2024-12-08 RX ORDER — CARBIDOPA AND LEVODOPA 50; 200 MG/1; MG/1
1 TABLET, EXTENDED RELEASE ORAL SEE ADMIN INSTRUCTIONS
COMMUNITY

## 2024-12-08 RX ORDER — SODIUM CHLORIDE 0.9 % (FLUSH) 0.9 %
5-40 SYRINGE (ML) INJECTION PRN
Status: DISCONTINUED | OUTPATIENT
Start: 2024-12-08 | End: 2024-12-12 | Stop reason: HOSPADM

## 2024-12-08 RX ADMIN — IOPAMIDOL 150 ML: 755 INJECTION, SOLUTION INTRAVENOUS at 13:55

## 2024-12-08 RX ADMIN — LEVOTHYROXINE SODIUM 25 MCG: 0.03 TABLET ORAL at 22:32

## 2024-12-08 RX ADMIN — MIRTAZAPINE 45 MG: 15 TABLET, FILM COATED ORAL at 22:33

## 2024-12-08 RX ADMIN — POLYETHYLENE GLYCOL 3350 17 G: 17 POWDER, FOR SOLUTION ORAL at 23:58

## 2024-12-08 RX ADMIN — DOCUSATE SODIUM 100 MG: 100 CAPSULE, LIQUID FILLED ORAL at 22:36

## 2024-12-08 RX ADMIN — PAROXETINE HYDROCHLORIDE 30 MG: 20 TABLET, FILM COATED ORAL at 22:32

## 2024-12-08 RX ADMIN — LIOTHYRONINE SODIUM 5 MCG: 5 TABLET ORAL at 22:32

## 2024-12-08 RX ADMIN — SENNOSIDES AND DOCUSATE SODIUM 1 TABLET: 50; 8.6 TABLET ORAL at 22:30

## 2024-12-08 RX ADMIN — RAMELTEON 8 MG: 8 TABLET ORAL at 22:30

## 2024-12-08 RX ADMIN — SODIUM CHLORIDE, PRESERVATIVE FREE 10 ML: 5 INJECTION INTRAVENOUS at 22:36

## 2024-12-08 RX ADMIN — Medication 1 TABLET: at 22:30

## 2024-12-08 RX ADMIN — CARBIDOPA AND LEVODOPA 1 TABLET: 25; 100 TABLET ORAL at 22:31

## 2024-12-08 ASSESSMENT — PAIN - FUNCTIONAL ASSESSMENT: PAIN_FUNCTIONAL_ASSESSMENT: NONE - DENIES PAIN

## 2024-12-08 NOTE — PROGRESS NOTES
Code Stroke Documentation      Symptoms: Left sided weakness    Baseline mRS:  3   Last Known Well: 1215 PM    Medical hx: Past Medical History:   Diagnosis Date    Arthritis     back and left knee    Dependence on walking stick     bilateral/single hiking stick if walking distance    Gout     Parkinson's disease     S/P deep brain stimulator placement     pt has a remote for this device    Thyroid cancer (HCC)     radioactive iodine 2020, and partial thyroidectomy    Uses brace     back brace      Vitals: There were no vitals filed for this visit.   AC/APT: None    VAN: Negative   NIHSS: 1a-LOC:0  1b-Month/Age:1 ( correct month, incorrect age)   1c-Open/Close Hand:0  2-Best Gaze:0  3-Visual Fields:1  4-Facial Palsy:  5a-Left Arm:0  5b-Right Arm:0  6a-Left Leb-Right Le-Limb Ataxia:  8-Sensory: 0  9-Best Language:1 ( paraphasic errors noted while speaking)  10-Dysarthria:0  11-Extinction/Inattention:0  TOTAL SCORE: 3    Imaging (personally reviewed): CT Head: No acute pathology, artifact from DBS   CT Angio Head and Neck: No LVO   CT Perfusion: No perfusion defect    Plan: tNK Candidate: NO  Mechanical thrombectomy Candidate: NO    *Perform dysphagia screening prior to any PO intake*     Discussed with: Tele Neurology Dr. Malagon     Arrival time:     SOFYA Lancaster - CNP  Neurovascular Nurse Practitioner

## 2024-12-08 NOTE — ED TRIAGE NOTES
Patient arrives via EMS with c/o left sided weakness. His nurse checked on him at 1215 and he was normal, noticed the weakness at 1230.  /75  Hx parkinson's

## 2024-12-08 NOTE — ED PROVIDER NOTES
[facility] EMERGENCY DEP  EMERGENCY DEPARTMENT ENCOUNTER      Pt Name: Fan Vang  MRN: [id number]  Birthdate 1950  Date of evaluation: 12/8/2024  Provider: Mehrdad Mcneill MD    CHIEF COMPLAINT       Chief Complaint   Patient presents with    Extremity Weakness         HISTORY OF PRESENT ILLNESS    73 y.o. male presents with reported left sided weakness, fatigue, and ongoing difficulty with movement. Sudden onset of symptoms within an hour of arrival, activated as level 1 code stroke. History of parkinsons.             Review of External Medical Records:     Nursing Notes were reviewed.    REVIEW OF SYSTEMS       Review of Systems    Except as noted above the remainder of the review of systems was reviewed and negative.       PAST MEDICAL HISTORY     Past Medical History:   Diagnosis Date    Arthritis     back and left knee    Dependence on walking stick     bilateral/single hiking stick if walking distance    Gout     Parkinson's disease     S/P deep brain stimulator placement     pt has a remote for this device    Thyroid cancer (HCC)     radioactive iodine 4/2020, and partial thyroidectomy    Uses brace     back brace         SURGICAL HISTORY       Past Surgical History:   Procedure Laterality Date    CATARACT EXTRACTION W/ INTRAOCULAR LENS IMPLANT Right     NASAL TURBINATE REDUCTION Bilateral     NEUROLOGICAL SURGERY      deep brain stimulator, with remote    OTHER SURGICAL HISTORY      epidural steroid injections     RETINAL DETACHMENT SURGERY Right     THYROIDECTOMY, PARTIAL Left 01/2020    THYROIDECTOMY, PARTIAL Right 10/2019    TOTAL KNEE ARTHROPLASTY Right 2013         CURRENT MEDICATIONS       Previous Medications    ACETAMINOPHEN (TYLENOL) 500 MG TABLET    Take 2 tablets by mouth 3 times daily    CARBIDOPA-LEVODOPA (SINEMET CR)  MG PER EXTENDED RELEASE TABLET    Take 3 tablets by mouth nightly 10 pm, 2am, 5 am    CARBIDOPA-LEVODOPA (SINEMET)  MG PER TABLET    Take 1 tablet by mouth 3  and evidence of bleeding in left leg. CT obtained of the leg and hip avulsion fracture along with associated hematoma appears to be producing a significant anemia requiring transfusion. Will hold aspirin for potential TIA symptoms with risk of bleeding extension and will need admission for monitoring and treatment of blood loss anemia.     Problems Addressed:  Avulsion fracture of left hip, closed, initial encounter (HCC): acute illness or injury  Generalized weakness: acute illness or injury  Recurrent falls: acute illness or injury  Symptomatic anemia: acute illness or injury  Thigh hematoma, left, initial encounter: acute illness or injury    Amount and/or Complexity of Data Reviewed  Independent Historian: spouse and EMS  Labs: ordered. Decision-making details documented in ED Course.     Details: Hb 6.6- 1u PRBC ordered  Radiology: ordered and independent interpretation performed. Decision-making details documented in ED Course.  ECG/medicine tests: ordered and independent interpretation performed. Decision-making details documented in ED Course.    Risk  Prescription drug management.  Decision regarding hospitalization.            REASSESSMENT     ED Course as of 12/08/24 1641   Sun Dec 08, 2024   1441 EKG 1436: Rate 69, Normal sinus rhythm with sinus arrhythmia, No ST segment or T wave abnormalities. Normal EKG.    [DK]      ED Course User Index  [DK] Mehrdad Mcneill MD           CONSULTS:  IP CONSULT TO TELE-NEUROLOGY  IP CONSULT TO ORTHOPEDIC SURGERY  IP CONSULT TO PHARMACY  IP CONSULT TO NEUROLOGY  IP CONSULT TO CASE MANAGEMENT  IP CONSULT TO STROKE COORDINATOR    PROCEDURES:  Unless otherwise noted below, none     Procedures        FINAL IMPRESSION      1. Symptomatic anemia    2. Generalized weakness    3. Recurrent falls    4. Avulsion fracture of left hip, closed, initial encounter ([facility])    5. Thigh hematoma, left, initial encounter          DISPOSITION/PLAN   DISPOSITION Decision To Admit 12/08/2024

## 2024-12-08 NOTE — ED NOTES
ED TO INPATIENT SBAR HANDOFF    Patient Name: Fan Vang   :  1950  73 y.o.   MRN:  [id number]  ED Room #:  ER14/14     Situation  Code Status: Prior   Allergies: Celecoxib, Quetiapine, and Trazodone  Weight: Patient Vitals for the past 96 hrs (Last 3 readings):   Weight   24 1401 82 kg (180 lb 12.4 oz)       Arrived from: nursing home    Chief Complaint:   Chief Complaint   Patient presents with    Extremity Weakness       Hospital Problem/Diagnosis:  Principal Problem:    Closed left hip fracture, initial encounter ([facility])  Resolved Problems:    * No resolved hospital problems. *      Mobility: limited bed mobility   ED Fall Risk: Presents to emergency department  because of falls (Syncope, seizure, or loss of consciousness): No, Age > 70: Yes, Altered Mental Status, Intoxication with alcohol or substance confusion (Disorientation, impaired judgment, poor safety awaremess, or inability to follow instructions): Yes, Impaired Mobility: Ambulates or transfers with assistive devices or assistance; Unable to ambulate or transer.: Yes, Nursing Judgement: Yes   Fell in ED or prior to admission: no   Restraints: no     Sitter: no   Family/Caregiver Present: yes    Neet to know social/safety information: hx parkinsons and right retinal detachment     Background  History:   Past Medical History:   Diagnosis Date    Arthritis     back and left knee    Dependence on walking stick     bilateral/single hiking stick if walking distance    Gout     Parkinson's disease     S/P deep brain stimulator placement     pt has a remote for this device    Thyroid cancer (HCC)     radioactive iodine 2020, and partial thyroidectomy    Uses brace     back brace       Assessment    Abnormal Assessment Findings: severe bruising and swelling on left leg  Imaging:   XR CHEST PORTABLE   Final Result   Mild pulmonary edema..      Electronically signed by Reza Jonas      Vascular duplex lower extremity venous left         CTA  HEAD NECK W CONTRAST         CT BRAIN PERFUSION         CT Lower Extremity Left WO Contrast   Final Result   Comminuted left greater trochanter avulsion   Associated left lateral hamstring intramuscular hematoma.   Electronically signed by Kendal Manrique      CT HEAD WO CONTRAST   Final Result   No acute process identified            Electronically signed by Kendal Manrique        Abnormal labs:   Abnormal Labs Reviewed   CBC WITH AUTO DIFFERENTIAL - Abnormal; Notable for the following components:       Result Value    RBC 2.04 (*)     Hemoglobin 6.6 (*)     Hematocrit 20.2 (*)     Immature Granulocytes % 1 (*)     Immature Granulocytes Absolute 0.1 (*)     All other components within normal limits   COMPREHENSIVE METABOLIC PANEL - Abnormal; Notable for the following components:    Glucose 104 (*)     BUN 25 (*)     BUN/Creatinine Ratio 32 (*)     Calcium 8.3 (*)     Total Bilirubin 1.7 (*)     ALT 11 (*)     Total Protein 6.2 (*)     Albumin 2.9 (*)     Albumin/Globulin Ratio 0.9 (*)     All other components within normal limits   BRAIN NATRIURETIC PEPTIDE - Abnormal; Notable for the following components:    NT Pro-BNP 1,321 (*)     All other components within normal limits       Vitals/MEWS: MEWS Score: 2  Level of Consciousness: Alert (0)   Vitals:    12/08/24 1430 12/08/24 1500 12/08/24 1545 12/08/24 1710   BP: 134/78 (!) 150/83 (!) 150/83 120/73   Pulse: 68 67 65 66   Resp: 25 20 20 21   Temp:    98.2 °F (36.8 °C)   TempSrc:       SpO2: 97% 96% 99% 92%   Weight:       Height:         DI:   Predictive Model Details          27 (Normal)  Factor Value    Calculated 12/8/2024 17:15 44% Age 73 years old    Deterioration Index Model 26% Respiratory rate 21     14% Hematocrit abnormal (20.2 %)     7% Pulse oximetry 92 %     4% BUN abnormal (25 MG/DL)     3% Systolic 120     2% Potassium 3.6 mmol/L     2% Sodium 139 mmol/L     1% Pulse 66     0% Temperature 98.2 °F (36.8 °C)     0% WBC count 6.2 K/uL         FiO2 (%): na  O2

## 2024-12-08 NOTE — ED NOTES
TRANSFER - OUT REPORT:    Verbal report given to Abdi on Fan Vang  being transferred to  for routine progression of patient care       Report consisted of patient's Situation, Background, Assessment and   Recommendations(SBAR).     Information from the following report(s) Nurse Handoff Report, Index, ED Encounter Summary, ED SBAR, MAR, and Recent Results was reviewed with the receiving nurse.    [place] Fall Assessment:    Presents to emergency department  because of falls (Syncope, seizure, or loss of consciousness): No  Age > 70: Yes  Altered Mental Status, Intoxication with alcohol or substance confusion (Disorientation, impaired judgment, poor safety awaremess, or inability to follow instructions): Yes  Impaired Mobility: Ambulates or transfers with assistive devices or assistance; Unable to ambulate or transer.: Yes  Nursing Judgement: Yes          Lines:   Peripheral IV 12/08/24 Posterior;Right Forearm (Active)        Opportunity for questions and clarification was provided.      Patient transported with:  Registered Nurse

## 2024-12-08 NOTE — CONSENT
Informed Consent for Blood Component Transfusion Note    I have discussed with the wife the rationale for blood component transfusion; its benefits in treating or preventing fatigue, organ damage, or death; and its risk which includes mild transfusion reactions, rare risk of blood borne infection, or more serious but rare reactions. I have discussed the alternatives to transfusion, including the risk and consequences of not receiving transfusion. The wife had an opportunity to ask questions and had agreed to proceed with transfusion of blood components.    Electronically signed by Mehrdad Mcneill MD on 12/8/24 at 3:35 PM EST

## 2024-12-08 NOTE — H&P
History and Physical    Date of Service:  12/8/2024  Primary Care Provider: Shi Ochoa MD  Source of information: The patient, Chart review, and Spouse/family member    Chief Complaint: Extremity Weakness      History of Presenting Illness:   Fan Vang is a 73 y.o. male with arthritis, gout, Parkinson disease s/p deep brain stimulator placement, h/o thyroid cancer s/p radioactive iodine and partial thyroidectomy who was BIBEMS from [facility] unit with left-sided weakness. Pt LKN was 1215 today. Wife is at bedside. Pt fell but does not remember, and the fall was not reported by the facility staff per the wife. Pt denies CP, hip pain, SOB, cough. Wife reports he gets out of bed frequently and will lie on the floor. He was recently discharged from [facility] to [facility]. Pt is not on ASA or any blood thinners. Code Stroke was called. CT head showed no acute process. CTA head/neck and CTP prelim reports no LVO, no perfusion defect, but possible RUL lung mass. CXR showed mild pulmonary edema. NIS recommended no tNK or mechanical thrombectomy. CT LLE showed comminuted left greater trochanter avulsion fracture with asociated left lateral hamstring intramuscular hematoma. ASA was not given due to hematoma.      REVIEW OF SYSTEMS:  Pertinent items are noted in the History of Present Illness.     Past Medical History:   Diagnosis Date    Arthritis     back and left knee    Dependence on walking stick     bilateral/single hiking stick if walking distance    Gout     Parkinson's disease     S/P deep brain stimulator placement     pt has a remote for this device    Thyroid cancer (HCC)     radioactive iodine 4/2020, and partial thyroidectomy    Uses brace     back brace      Past Surgical History:   Procedure Laterality Date    CATARACT EXTRACTION W/ INTRAOCULAR LENS IMPLANT Right     NASAL TURBINATE REDUCTION Bilateral     NEUROLOGICAL SURGERY      deep

## 2024-12-09 ENCOUNTER — APPOINTMENT (OUTPATIENT)
Facility: HOSPITAL | Age: 74
DRG: 535 | End: 2024-12-09
Attending: INTERNAL MEDICINE
Payer: MEDICARE

## 2024-12-09 LAB
25(OH)D3 SERPL-MCNC: 44.1 NG/ML (ref 30–100)
ANION GAP SERPL CALC-SCNC: 3 MMOL/L (ref 2–12)
APPEARANCE UR: CLEAR
BACTERIA URNS QL MICRO: NEGATIVE /HPF
BASOPHILS # BLD: 0.1 K/UL (ref 0–0.1)
BASOPHILS NFR BLD: 1 % (ref 0–1)
BILIRUB UR QL CFM: NEGATIVE
BUN SERPL-MCNC: 18 MG/DL (ref 6–20)
BUN/CREAT SERPL: 29 (ref 12–20)
CALCIUM SERPL-MCNC: 8 MG/DL (ref 8.5–10.1)
CAOX CRY URNS QL MICRO: ABNORMAL
CHLORIDE SERPL-SCNC: 110 MMOL/L (ref 97–108)
CHOLEST SERPL-MCNC: 125 MG/DL
CO2 SERPL-SCNC: 28 MMOL/L (ref 21–32)
COLOR UR: ABNORMAL
CREAT SERPL-MCNC: 0.62 MG/DL (ref 0.7–1.3)
DIFFERENTIAL METHOD BLD: ABNORMAL
ECHO AO ROOT DIAM: 3.5 CM
ECHO AO ROOT INDEX: 1.75 CM/M2
ECHO BSA: 2.01 M2
ECHO BSA: 2.01 M2
ECHO LV EF PHYSICIAN: 55 %
ECHO LVOT AREA: 4.5 CM2
ECHO LVOT DIAM: 2.4 CM
ECHO PV MAX VELOCITY: 1.3 M/S
ECHO PV PEAK GRADIENT: 6 MMHG
ECHO TV REGURGITANT MAX VELOCITY: 1.66 M/S
ECHO TV REGURGITANT PEAK GRADIENT: 11 MMHG
EOSINOPHIL # BLD: 0.2 K/UL (ref 0–0.4)
EOSINOPHIL NFR BLD: 3 % (ref 0–7)
EPITH CASTS URNS QL MICRO: ABNORMAL /LPF
ERYTHROCYTE [DISTWIDTH] IN BLOOD BY AUTOMATED COUNT: 15.2 % (ref 11.5–14.5)
ERYTHROCYTE [DISTWIDTH] IN BLOOD BY AUTOMATED COUNT: 15.4 % (ref 11.5–14.5)
EST. AVERAGE GLUCOSE BLD GHB EST-MCNC: ABNORMAL MG/DL
GLUCOSE SERPL-MCNC: 90 MG/DL (ref 65–100)
GLUCOSE UR STRIP.AUTO-MCNC: NEGATIVE MG/DL
HBA1C MFR BLD: <3.8 % (ref 4–5.6)
HCT VFR BLD AUTO: 21.3 % (ref 36.6–50.3)
HCT VFR BLD AUTO: 22.4 % (ref 36.6–50.3)
HCT VFR BLD AUTO: 28.3 % (ref 36.6–50.3)
HDLC SERPL-MCNC: 50 MG/DL
HDLC SERPL: 2.5 (ref 0–5)
HEMOCCULT STL QL: NEGATIVE
HGB BLD-MCNC: 6.9 G/DL (ref 12.1–17)
HGB BLD-MCNC: 7.2 G/DL (ref 12.1–17)
HGB BLD-MCNC: 9.4 G/DL (ref 12.1–17)
HGB UR QL STRIP: NEGATIVE
HISTORY CHECK: NORMAL
IMM GRANULOCYTES # BLD AUTO: 0 K/UL (ref 0–0.04)
IMM GRANULOCYTES NFR BLD AUTO: 0 % (ref 0–0.5)
KETONES UR QL STRIP.AUTO: ABNORMAL MG/DL
LDLC SERPL CALC-MCNC: 57.6 MG/DL (ref 0–100)
LEUKOCYTE ESTERASE UR QL STRIP.AUTO: ABNORMAL
LYMPHOCYTES # BLD: 0.9 K/UL (ref 0.8–3.5)
LYMPHOCYTES NFR BLD: 12 % (ref 12–49)
MCH RBC QN AUTO: 31.4 PG (ref 26–34)
MCH RBC QN AUTO: 31.4 PG (ref 26–34)
MCHC RBC AUTO-ENTMCNC: 32.4 G/DL (ref 30–36.5)
MCHC RBC AUTO-ENTMCNC: 33.2 G/DL (ref 30–36.5)
MCV RBC AUTO: 94.6 FL (ref 80–99)
MCV RBC AUTO: 96.8 FL (ref 80–99)
MONOCYTES # BLD: 0.6 K/UL (ref 0–1)
MONOCYTES NFR BLD: 8 % (ref 5–13)
NEUTS SEG # BLD: 5.7 K/UL (ref 1.8–8)
NEUTS SEG NFR BLD: 76 % (ref 32–75)
NITRITE UR QL STRIP.AUTO: POSITIVE
NRBC # BLD: 0 K/UL (ref 0–0.01)
NRBC # BLD: 0 K/UL (ref 0–0.01)
NRBC BLD-RTO: 0 PER 100 WBC
NRBC BLD-RTO: 0 PER 100 WBC
PH UR STRIP: 7 (ref 5–8)
PLATELET # BLD AUTO: 312 K/UL (ref 150–400)
PLATELET # BLD AUTO: 398 K/UL (ref 150–400)
PMV BLD AUTO: 9.5 FL (ref 8.9–12.9)
PMV BLD AUTO: 9.8 FL (ref 8.9–12.9)
POTASSIUM SERPL-SCNC: 3.6 MMOL/L (ref 3.5–5.1)
PROT UR STRIP-MCNC: ABNORMAL MG/DL
RBC # BLD AUTO: 2.2 M/UL (ref 4.1–5.7)
RBC # BLD AUTO: 2.99 M/UL (ref 4.1–5.7)
RBC #/AREA URNS HPF: ABNORMAL /HPF (ref 0–5)
SODIUM SERPL-SCNC: 141 MMOL/L (ref 136–145)
SP GR UR REFRACTOMETRY: 1.01 (ref 1–1.03)
TRIGL SERPL-MCNC: 87 MG/DL
URINE CULTURE IF INDICATED: ABNORMAL
UROBILINOGEN UR QL STRIP.AUTO: 2 EU/DL (ref 0.2–1)
VLDLC SERPL CALC-MCNC: 17.4 MG/DL
WBC # BLD AUTO: 5.4 K/UL (ref 4.1–11.1)
WBC # BLD AUTO: 7.5 K/UL (ref 4.1–11.1)
WBC URNS QL MICRO: ABNORMAL /HPF (ref 0–4)

## 2024-12-09 PROCEDURE — 93306 TTE W/DOPPLER COMPLETE: CPT

## 2024-12-09 PROCEDURE — 85018 HEMOGLOBIN: CPT

## 2024-12-09 PROCEDURE — 2060000000 HC ICU INTERMEDIATE R&B

## 2024-12-09 PROCEDURE — 80048 BASIC METABOLIC PNL TOTAL CA: CPT

## 2024-12-09 PROCEDURE — 97530 THERAPEUTIC ACTIVITIES: CPT

## 2024-12-09 PROCEDURE — P9016 RBC LEUKOCYTES REDUCED: HCPCS

## 2024-12-09 PROCEDURE — 97535 SELF CARE MNGMENT TRAINING: CPT

## 2024-12-09 PROCEDURE — 81001 URINALYSIS AUTO W/SCOPE: CPT

## 2024-12-09 PROCEDURE — 2580000003 HC RX 258: Performed by: INTERNAL MEDICINE

## 2024-12-09 PROCEDURE — 6370000000 HC RX 637 (ALT 250 FOR IP)

## 2024-12-09 PROCEDURE — 97165 OT EVAL LOW COMPLEX 30 MIN: CPT

## 2024-12-09 PROCEDURE — 93308 TTE F-UP OR LMTD: CPT | Performed by: SPECIALIST

## 2024-12-09 PROCEDURE — 85025 COMPLETE CBC W/AUTO DIFF WBC: CPT

## 2024-12-09 PROCEDURE — 6370000000 HC RX 637 (ALT 250 FOR IP): Performed by: HOSPITALIST

## 2024-12-09 PROCEDURE — 36415 COLL VENOUS BLD VENIPUNCTURE: CPT

## 2024-12-09 PROCEDURE — 80061 LIPID PANEL: CPT

## 2024-12-09 PROCEDURE — 93321 DOPPLER ECHO F-UP/LMTD STD: CPT | Performed by: SPECIALIST

## 2024-12-09 PROCEDURE — APPNB45 APP NON BILLABLE 31-45 MINUTES: Performed by: PHYSICIAN ASSISTANT

## 2024-12-09 PROCEDURE — 36430 TRANSFUSION BLD/BLD COMPNT: CPT

## 2024-12-09 PROCEDURE — 83036 HEMOGLOBIN GLYCOSYLATED A1C: CPT

## 2024-12-09 PROCEDURE — 97161 PT EVAL LOW COMPLEX 20 MIN: CPT

## 2024-12-09 PROCEDURE — 85014 HEMATOCRIT: CPT

## 2024-12-09 PROCEDURE — 82306 VITAMIN D 25 HYDROXY: CPT

## 2024-12-09 PROCEDURE — 93308 TTE F-UP OR LMTD: CPT

## 2024-12-09 PROCEDURE — 92610 EVALUATE SWALLOWING FUNCTION: CPT

## 2024-12-09 PROCEDURE — 30233N1 TRANSFUSION OF NONAUTOLOGOUS RED BLOOD CELLS INTO PERIPHERAL VEIN, PERCUTANEOUS APPROACH: ICD-10-PCS | Performed by: INTERNAL MEDICINE

## 2024-12-09 PROCEDURE — 93325 DOPPLER ECHO COLOR FLOW MAPG: CPT | Performed by: SPECIALIST

## 2024-12-09 PROCEDURE — 6370000000 HC RX 637 (ALT 250 FOR IP): Performed by: INTERNAL MEDICINE

## 2024-12-09 PROCEDURE — 85027 COMPLETE CBC AUTOMATED: CPT

## 2024-12-09 PROCEDURE — 82272 OCCULT BLD FECES 1-3 TESTS: CPT

## 2024-12-09 RX ORDER — SODIUM CHLORIDE 9 MG/ML
INJECTION, SOLUTION INTRAVENOUS PRN
Status: DISCONTINUED | OUTPATIENT
Start: 2024-12-09 | End: 2024-12-12 | Stop reason: HOSPADM

## 2024-12-09 RX ADMIN — CARBIDOPA AND LEVODOPA 1 TABLET: 25; 100 TABLET ORAL at 20:30

## 2024-12-09 RX ADMIN — SODIUM CHLORIDE, PRESERVATIVE FREE 10 ML: 5 INJECTION INTRAVENOUS at 08:41

## 2024-12-09 RX ADMIN — SENNOSIDES AND DOCUSATE SODIUM 1 TABLET: 50; 8.6 TABLET ORAL at 08:38

## 2024-12-09 RX ADMIN — LIOTHYRONINE SODIUM 10 MCG: 5 TABLET ORAL at 08:39

## 2024-12-09 RX ADMIN — CARBIDOPA, LEVODOPA AND ENTACAPONE 1 TABLET: 31.25; 200; 125 TABLET, FILM COATED ORAL at 20:30

## 2024-12-09 RX ADMIN — OXYCODONE HYDROCHLORIDE AND ACETAMINOPHEN 500 MG: 500 TABLET ORAL at 08:37

## 2024-12-09 RX ADMIN — CARBIDOPA AND LEVODOPA 1 TABLET: 50; 200 TABLET, EXTENDED RELEASE ORAL at 04:36

## 2024-12-09 RX ADMIN — CARBIDOPA, LEVODOPA AND ENTACAPONE 1 TABLET: 31.25; 200; 125 TABLET, FILM COATED ORAL at 16:00

## 2024-12-09 RX ADMIN — SODIUM CHLORIDE, PRESERVATIVE FREE 10 ML: 5 INJECTION INTRAVENOUS at 21:25

## 2024-12-09 RX ADMIN — RAMELTEON 8 MG: 8 TABLET ORAL at 21:05

## 2024-12-09 RX ADMIN — CARBIDOPA AND LEVODOPA 1 TABLET: 25; 100 TABLET ORAL at 14:17

## 2024-12-09 RX ADMIN — DOCUSATE SODIUM 100 MG: 100 CAPSULE, LIQUID FILLED ORAL at 20:31

## 2024-12-09 RX ADMIN — LIOTHYRONINE SODIUM 5 MCG: 5 TABLET ORAL at 20:28

## 2024-12-09 RX ADMIN — OXYCODONE HYDROCHLORIDE AND ACETAMINOPHEN 500 MG: 500 TABLET ORAL at 00:01

## 2024-12-09 RX ADMIN — SENNOSIDES AND DOCUSATE SODIUM 1 TABLET: 50; 8.6 TABLET ORAL at 20:28

## 2024-12-09 RX ADMIN — CARBIDOPA, LEVODOPA AND ENTACAPONE 1 TABLET: 31.25; 200; 125 TABLET, FILM COATED ORAL at 12:18

## 2024-12-09 RX ADMIN — Medication 5000 UNITS: at 08:38

## 2024-12-09 RX ADMIN — CARBIDOPA, LEVODOPA AND ENTACAPONE 1 TABLET: 31.25; 200; 125 TABLET, FILM COATED ORAL at 14:18

## 2024-12-09 RX ADMIN — MIRTAZAPINE 45 MG: 15 TABLET, FILM COATED ORAL at 20:29

## 2024-12-09 RX ADMIN — CARBIDOPA AND LEVODOPA 1 TABLET: 25; 100 TABLET ORAL at 08:37

## 2024-12-09 RX ADMIN — POLYETHYLENE GLYCOL 3350 17 G: 17 POWDER, FOR SOLUTION ORAL at 08:40

## 2024-12-09 RX ADMIN — CARBIDOPA AND LEVODOPA 1 TABLET: 50; 200 TABLET, EXTENDED RELEASE ORAL at 00:01

## 2024-12-09 RX ADMIN — CARBIDOPA, LEVODOPA AND ENTACAPONE 1 TABLET: 31.25; 200; 125 TABLET, FILM COATED ORAL at 09:55

## 2024-12-09 RX ADMIN — LEVOTHYROXINE SODIUM 25 MCG: 0.03 TABLET ORAL at 20:29

## 2024-12-09 RX ADMIN — CARBIDOPA, LEVODOPA AND ENTACAPONE 1 TABLET: 31.25; 200; 125 TABLET, FILM COATED ORAL at 08:40

## 2024-12-09 RX ADMIN — LIOTHYRONINE SODIUM 5 MCG: 5 TABLET ORAL at 14:17

## 2024-12-09 RX ADMIN — CARBIDOPA AND LEVODOPA 1 TABLET: 50; 200 TABLET, EXTENDED RELEASE ORAL at 01:25

## 2024-12-09 RX ADMIN — LEVOTHYROXINE SODIUM 25 MCG: 0.03 TABLET ORAL at 08:38

## 2024-12-09 RX ADMIN — Medication 1 TABLET: at 20:31

## 2024-12-09 RX ADMIN — PAROXETINE HYDROCHLORIDE 30 MG: 20 TABLET, FILM COATED ORAL at 20:29

## 2024-12-09 RX ADMIN — CARBIDOPA AND LEVODOPA 1 TABLET: 50; 200 TABLET, EXTENDED RELEASE ORAL at 20:28

## 2024-12-09 RX ADMIN — CARBIDOPA, LEVODOPA AND ENTACAPONE 1 TABLET: 31.25; 200; 125 TABLET, FILM COATED ORAL at 18:02

## 2024-12-09 RX ADMIN — POTASSIUM BICARBONATE 20 MEQ: 782 TABLET, EFFERVESCENT ORAL at 12:18

## 2024-12-09 RX ADMIN — DOCUSATE SODIUM 100 MG: 100 CAPSULE, LIQUID FILLED ORAL at 08:41

## 2024-12-09 NOTE — CARE COORDINATION
Care Management Initial Assessment       RUR: 20%  Readmission? Yes - 12/8/24  1st IM letter given? Yes -12/8/24  1st  letter given: No    12/09/24 0610   Service Assessment   Patient Orientation Person   Cognition Alert   History Provided By Spouse   Primary Caregiver Other (Comment)   Support Systems Spouse/Significant Other;Family Members   Patient's Healthcare Decision Maker is: Legal Next of Kin   Prior Functional Level Assistance with the following:   Current Functional Level Assistance with the following:   Can patient return to prior living arrangement Unknown at present   Financial Resources Medicare;Other (Comment)  (Medicare Supplement)   Social/Functional History   Lives With Other (comment)   Type of Home Assisted living   Bathroom Shower/Tub Walk-in shower   Bathroom Toilet Handicap height   Bathroom Equipment Grab bars in shower   Bathroom Accessibility Accessible   Receives Help From Other (comment)  (staff at [facility])   Prior Level of Assist for ADLs Needs assistance   Toileting Needs assistance   Prior Level of Assist for Homemaking Needs assistance   Homemaking Responsibilities No   Ambulation Assistance Needs assistance   Prior Level of Assist for Transfers Needs assistance   Active  No   Occupation Retired   Discharge Planning   Type of Residence Assisted living  ([facility])   Living Arrangements Other (Comment)   Current Services Prior To Admission Other (Comment)  ([facility])   DME Ordered? No   Potential Assistance Purchasing Medications No   Patient expects to be discharged to: Assisted living  ([facility]))   History of falls? 1   Services At/After Discharge   Transition of Care Consult (CM Consult) Discharge Planning   Services At/After Discharge Assisted living   Mode of Transport at Discharge BLS   Condition of Participation: Discharge Planning   Freedom of Choice list was provided with basic dialogue that  supports the patient's individualized plan of care/goals, treatment preferences, and shares the quality data associated with the providers?  Yes     Chart reviewed.  Patient admitted here from [facility] ([facility]) on 12/8/24.   The patient had a prior SNF stay at [facility] following his hospitalization at [facility] 11/16/24.    Per the spouse, she was told patient was not making progress while in the SNF ([facility]).  The patient was admitted to [facility] last Wednesday 12/4/24.    The spouse is open to SNF if this will be beneficial to the patient.     CM continuing to follow.  Loli Peñaloza, MATTW, CRM  [phone]

## 2024-12-09 NOTE — PROGRESS NOTES
Admission Medication Reconciliation:    Information obtained from:  Wife  RxQuery data available¹:  Yes    Comments/Recommendations: Updated PTA meds/reviewed patient's allergies.    1)  Met with Savanna Vang (wife) at bedside to discuss Fan Vang' medications. Savanna provided a dependable medication history and provided non-formulary medications.    No new medication allergies, current medication \"allergies\" are less allergies and moreso side effects from medications.     Of note:     Fan was recently started on divalproex 125 mg DR for anxiety/agitation, per Savanna this made him \"weird\" and PCP was unsure about lowering dose to 125 mg DR daily.    Patient uses levodopa (INBRIJA) 84 mg inhalant PRN for anxiety, Savanna is aware this medication is NOT stocked at our facility and will bring in tomorrow (12/9)      2)  Medication changes (since last review):    Added:  -Divalproex 125 mg DR PO BID (see above for comments)    Adjusted  - Liothyronine: Adjusted to 10 mcg in the AM, 5 mcg afternoon, 5 mcg QHS  -Carbidopa-levodopa  mg CR: Adjusted from 3 tablets TID to 1 tablet TID at times 2200, 0200, 0500    Removed  - Diclofenac       ¹RxQuery pharmacy benefit data reflects medications filled and processed through the patient's insurance, however   this data does NOT capture whether the medication was picked up or is currently being taken by the patient.    Allergies:  Celecoxib, Quetiapine, and Trazodone    Significant PMH/Disease States:   Past Medical History:   Diagnosis Date    Arthritis     back and left knee    Dependence on walking stick     bilateral/single hiking stick if walking distance    Gout     Parkinson's disease     S/P deep brain stimulator placement     pt has a remote for this device    Thyroid cancer (HCC)     radioactive iodine 4/2020, and partial thyroidectomy    Uses brace     back brace     Chief Complaint for this Admission:    Chief Complaint   Patient presents with    Extremity Weakness  with any questions or concerns at [phone] and we will direct you to the clinical pharmacist covering this patient's care while in-house.   Errol Friedman RPH

## 2024-12-09 NOTE — PROGRESS NOTES
4 Eyes Skin Assessment     NAME:  Fan Vang  YOB: 1950  MEDICAL RECORD NUMBER:  [id number]    The patient is being assessed for  Admission    I agree that at least one RN has performed a thorough Head to Toe Skin Assessment on the patient. ALL assessment sites listed below have been assessed.      Areas assessed by both nurses:    Head, Face, Ears, Shoulders, Back, Chest, Arms, Elbows, Hands, Sacrum. Buttock, Coccyx, Ischium, and Legs. Feet and Heels        Does the Patient have a Wound? Yes wound(s) were present on assessment. LDA wound assessment was Initiated and completed by RN       Jude Prevention initiated by RN: Yes  Wound Care Orders initiated by RN: No    Pressure Injury (Stage 3,4, Unstageable, DTI, NWPT, and Complex wounds) if present, place Wound referral order by RN under : Yes    New Ostomies, if present place, Ostomy referral order under : No     Nurse 1 eSignature: Electronically signed by Abdi Vera RN on 12/8/24 at 7:07 PM EST    **SHARE this note so that the co-signing nurse can place an eSignature**    Nurse 2 eSignature: Electronically signed by Renetta Mendes RN on 12/8/24 at 7:29 PM EST

## 2024-12-09 NOTE — PLAN OF CARE
Problem: Occupational Therapy - Adult  Goal: By Discharge: Performs self-care activities at highest level of function for planned discharge setting.  See evaluation for individualized goals.  Description: FUNCTIONAL STATUS PRIOR TO ADMISSION:  The patient presents from [facility]. Pt is a questionable historian but reports ambulating with a RW and performing ADLs with little assistance.      Occupational Therapy Goals:  Initiated 12/9/2024  1.  Patient will perform grooming with Set-up and Supervision sitting EOB within 7 day(s).  2.  Patient will perform upper body dressing with Set-up within 7 day(s).  3.  Patient will perform toilet transfers with Moderate Assist  within 7 day(s).  4.  Patient will perform all aspects of toileting with Moderate Assist within 7 day(s).  5.  Patient will participate in upper extremity therapeutic exercise/activities with Supervision for 5 minutes within 7 day(s).    6.  Patient will utilize energy conservation techniques during functional activities with verbal cues within 7 day(s).   Outcome: Progressing   OCCUPATIONAL THERAPY EVALUATION    Patient: Fan Vang (73 y.o. male)  Date: 12/9/2024  Primary Diagnosis: Generalized weakness [R53.1]  Recurrent falls [R29.6]  Closed left hip fracture, initial encounter ([facility]) [S72.002A]  Thigh hematoma, left, initial encounter [S70.12XA]  Symptomatic anemia [D64.9]  Avulsion fracture of left hip, closed, initial encounter ([facility]) [S72.002A]         Precautions: Fall Risk, Weight Bearing (avoid abduction movements LLE per ortho surgery)   Left Lower Extremity Weight Bearing: Weight Bearing As Tolerated              ASSESSMENT :  The patient is limited by decreased functional mobility, independence in ADLs, strength, activity tolerance, safety awareness, cognition, balance, and increased pain levels. Pt presents from [facility] significant for Parkinson's disease. He initially presented following a fall and with L  Exceptions  Arousal/Alertness: Delayed responses to stimuli;Impaired  Following Commands: Impaired;Follows one step commands with increased time;Follows one step commands with repetition  Attention Span: Impaired;Attends with cues to redirect;Difficulty attending to directions;Difficulty dividing attention  Memory: Impaired;Decreased recall of precautions;Decreased short term memory;Decreased recall of recent events;Decreased recall of biographical Information  Safety Judgement: Impaired;Decreased awareness of need for safety;Decreased awareness of need for assistance  Problem Solving: Impaired;Decreased awareness of errors  Insights: Not aware of deficits  Initiation: Requires cues for all  Sequencing: Requires cues for all  Cognition Comment: waxing and waning confusion in session; highly impulsive, restless easily redirectable; intermittent inappropriate responses to basic questions    Skin: bruising extending nearly full posterior of LLE       Vision/Perceptual:    Vision - Basic Assessment  Prior Vision: Wears glasses all the time (R eye lense covered)     Vision  Vision: Impaired  Vision Exceptions: Wears glasses at all times (impaired vision R eye with eye patch)       Range of Motion:   AROM: Generally decreased, functional  PROM: Generally decreased, functional      Strength:  Strength: Generally decreased, functional      Coordination:  Coordination: Generally decreased, functional     Functional Mobility and Transfers for ADLs:    Bed Mobility:     Bed Mobility Training  Bed Mobility Training: Yes  Overall Level of Assistance: Maximum assistance;Assist X2;Additional time  Interventions: Verbal cues;Tactile cues;Safety awareness training;Manual cues;Weight shifting training/pressure relief  Supine to Sit: Moderate assistance;Assist X2  Sit to Supine: Maximum assistance;Assist X2  Scooting: Moderate assistance;Assist X1    Transfers:      Transfer Training  Transfer Training: Yes  Overall Level of

## 2024-12-09 NOTE — PROGRESS NOTES
TRANSFER - IN REPORT:    Verbal report received from KATYA Pantoja on Fan Vang  being received from ED for urgent transfer      Report consisted of patient's Situation, Background, Assessment and   Recommendations(SBAR).     Information from the following report(s) Nurse Handoff Report, Index, Adult Overview, and Neuro Assessment was reviewed with the receiving nurse.    Opportunity for questions and clarification was provided.      Assessment completed upon patient's arrival to unit and care assumed.

## 2024-12-09 NOTE — PLAN OF CARE
Problem: SLP Adult - Impaired Swallowing  Goal: By Discharge: Advance to least restrictive diet without signs or symptoms of aspiration for planned discharge setting.  See evaluation for individualized goals.  Note: Evaluation 12/9/24  Pt will tolerate the least restrictive diet without overt s/s of aspiration or changes in respiration within 1 week.      Speech LAnguage Pathology EVALUATION    Patient: Fan Vang (73 y.o. male)  Date: 12/9/2024  Primary Diagnosis: Generalized weakness [R53.1]  Recurrent falls [R29.6]  Closed left hip fracture, initial encounter ([facility]) [S72.002A]  Thigh hematoma, left, initial encounter [S70.12XA]  Symptomatic anemia [D64.9]  Avulsion fracture of left hip, closed, initial encounter ([facility]) [S72.002A]       Precautions:                     ASSESSMENT :  Pt is a 74 yo male admitted with reported left sided weakness, fatigue, and ongoing difficulty with movement with code stroke ordered. CT negative. CXR: mild pulmonary edema. CTA: 1. No acute vascular abnormality, no large vessel occlusion. No hemodynamically significant stenosis. Normal perfusion. 2. Partially visualized density in the right lower lobe, highly suspicious for  pulmonary mass, versus masslike consolidation. Recommend dedicated CT of the  chest with contrast for further evaluation. 3. 4.8 cm ascending thoracic aortic aneurysm. 4. Mild to moderate spinal canal narrowing at C3-4. Pt is currently on a soft and bite sized diet with thin liquids. RN reports he is confused.     Pt observed with thin liquids, applesauce and cracker. Pt required 1:1 assistance due to vision deficits and thus with decreased po acceptance. Pt with functional mastication despite being mildly delayed, mild prolonged a/p transit and no anterior spillage. Pt without overt s/s of aspiration noted or residue after the swallow.     Recommend continuing with a soft and bite sized diet and thin liquids to assist with his vision deficits. Baseline diet

## 2024-12-09 NOTE — PLAN OF CARE
Problem: Safety - Adult  Goal: Free from fall injury  12/9/2024 0401 by Yvette Beyer RN  Outcome: Progressing  12/8/2024 1924 by Abdi Vera RN  Outcome: Progressing     Problem: Discharge Planning  Goal: Discharge to home or other facility with appropriate resources  12/9/2024 0401 by Yvette Beyer RN  Outcome: Progressing  Flowsheets (Taken 12/8/2024 2000)  Discharge to home or other facility with appropriate resources: Identify barriers to discharge with patient and caregiver  12/8/2024 1924 by Abdi Vera, RN  Outcome: Progressing     Problem: Skin/Tissue Integrity  Goal: Absence of new skin breakdown  Description: 1.  Monitor for areas of redness and/or skin breakdown  2.  Assess vascular access sites hourly  3.  Every 4-6 hours minimum:  Change oxygen saturation probe site  4.  Every 4-6 hours:  If on nasal continuous positive airway pressure, respiratory therapy assess nares and determine need for appliance change or resting period.  Outcome: Progressing

## 2024-12-09 NOTE — CARE COORDINATION
12/09/24 1800   Readmission Assessment   Number of Days since last admission? 8-30 days   Previous Disposition SNF   Who is being Interviewed Caregiver  (spouse)   What was the patient's/caregiver's perception as to why they think they needed to return back to the hospital? Other (Comment)   Did you visit your Primary Care Physician after you left the hospital, before you returned this time? Yes  (PCP Dr Ashby (at [facility]))   Who advised the patient to return to the hospital? Other (Comment)   Does the patient report anything that got in the way of taking their medications? No   In our efforts to provide the best possible care to you and others like you, can you think of anything that we could have done to help you after you left the hospital the first time, so that you might not have needed to return so soon? Other (Comment)     Patient to return to [facility] ([facility] memory care) vs SNF in [facility] with this  (SNF with therapy) is beneficial to the patient.   CM continuing to follow.    ANAND Almanza, CRM  [phone]

## 2024-12-09 NOTE — PROGRESS NOTES
Hospitalist Progress Note  Mary Ellen Garcia MD  Answering service: [phone]        Date of Service:  2024  NAME:  Fan Vang  :  1950  MRN:  [id number]      Admission Summary:      Fan Vang is a 73 y.o. male with arthritis, gout, Parkinson disease s/p deep brain stimulator placement, h/o thyroid cancer s/p radioactive iodine and partial thyroidectomy who was BIBEMS from [facility] unit with left-sided weakness. Pt LKN was 1215 today. Wife is at bedside. Pt fell but does not remember, and the fall was not reported by the facility staff per the wife. Pt denies CP, hip pain, SOB, cough. Wife reports he gets out of bed frequently and will lie on the floor. He was recently discharged from [facility] to [facility] unit. Pt is not on ASA or any blood thinners. Code Stroke was called. CT head showed no acute process. CTA head/neck and CTP prelim reports no LVO, no perfusion defect, but possible RUL lung mass. CXR showed mild pulmonary edema. NIS recommended no tNK or mechanical thrombectomy. CT LLE showed comminuted left greater trochanter avulsion fracture with asociated left lateral hamstring intramuscular hematoma. ASA was not given due to hematoma.     Interval history / Subjective:   Some confusion/dementia  But able to have conversation  Following commands, no left upper ext weakness noticed.  Left leg movement limited due to fracture and large hematoma        CTA HEAD NECK W CONTRAST    Result Date: 2024  1. No acute vascular abnormality, no large vessel occlusion. No hemodynamically significant stenosis. Normal perfusion. 2. Partially visualized density in the right lower lobe, highly suspicious for pulmonary mass, versus masslike consolidation. Recommend dedicated CT of the chest with contrast for further evaluation. 3. 4.8 cm ascending thoracic aortic  11:47 AM     No results found for: \"GLUCPOC\"  [unfilled]      Medications Reviewed:     Current Facility-Administered Medications   Medication Dose Route Frequency    0.9 % sodium chloride infusion   IntraVENous PRN    0.9 % sodium chloride infusion   IntraVENous PRN    sodium chloride flush 0.9 % injection 5-40 mL  5-40 mL IntraVENous 2 times per day    sodium chloride flush 0.9 % injection 5-40 mL  5-40 mL IntraVENous PRN    0.9 % sodium chloride infusion   IntraVENous PRN    ondansetron (ZOFRAN) injection 4 mg  4 mg IntraVENous Q6H PRN    sennosides-docusate sodium (SENOKOT-S) 8.6-50 MG tablet 1 tablet  1 tablet Oral BID    morphine (PF) injection 2 mg  2 mg IntraVENous Q4H PRN    naloxone (NARCAN) injection 0.4 mg  0.4 mg IntraVENous PRN    polyethylene glycol (GLYCOLAX) packet 17 g  17 g Oral Daily    acetaminophen (TYLENOL) tablet 1,000 mg  1,000 mg Oral TID PRN    carbidopa-levodopa (SINEMET CR)  MG per extended release tablet 1 tablet  1 tablet Oral 3 times per day    carbidopa-levodopa (SINEMET)  MG per tablet 1 tablet  1 tablet Oral TID    carbidopa-levodopa-entacapone (STALEVO 125) 31.-200 MG per tablet 1 tablet - PATIENT SUPPLIED (Patient Supplied)  1 tablet Oral 7 times per day    [Held by provider] divalproex (DEPAKOTE) DR tablet 125 mg  125 mg Oral BID    docusate sodium (COLACE) capsule 100 mg  100 mg Oral BID    Levodopa CAPS 84 mg  84 mg Inhalation PRN    levothyroxine (SYNTHROID) tablet 25 mcg  25 mcg Oral BID    liothyronine (CYTOMEL) tablet 10 mcg  10 mcg Oral Daily    liothyronine (CYTOMEL) tablet 5 mcg  5 mcg Oral 2 times per day    mirtazapine (REMERON) tablet 45 mg  45 mg Oral Nightly    PARoxetine (PAXIL) tablet 30 mg  30 mg Oral Nightly    ramelteon (ROZEREM) tablet 8 mg - PATIENT SUPPLIED MEDICATION (Patient Supplied)  8 mg Oral Nightly    ascorbic acid (VITAMIN C) tablet 500 mg  500 mg Oral Daily    Vitamin D (CHOLECALCIFEROL) tablet 5,000 Units  5,000 Units Oral Daily

## 2024-12-09 NOTE — PLAN OF CARE
Problem: Physical Therapy - Adult  Goal: By Discharge: Performs mobility at highest level of function for planned discharge setting.  See evaluation for individualized goals.  Description: FUNCTIONAL STATUS PRIOR TO ADMISSION: Pt was an inconsistent historian this date; unclear level of A req'd for ADLs or mobility level; pt reports utilizing RW for mobility, but with waxing and waning confusion throughout; no family present for clarification    HOME SUPPORT PRIOR TO ADMISSION: Pt resided at [facility]; unclear level of assistance available for mobility/ADL activities    Physical Therapy Goals  Initiated 12/9/2024  1.  Patient will move from supine to sit and sit to supine in bed with supervision/set-up within 7 day(s).    2.  Patient will perform sit to stand with minimal assistance within 7 day(s).  3.  Patient will transfer from bed to chair and chair to bed with minimal assistance using the least restrictive device within 7 day(s).  4.  Patient will ambulate with minimal assistance for 25 feet with the least restrictive device within 7 day(s).   Outcome: Progressing   PHYSICAL THERAPY EVALUATION    Patient: Fan Vang (73 y.o. male)  Date: 12/9/2024  Primary Diagnosis: Generalized weakness [R53.1]  Recurrent falls [R29.6]  Closed left hip fracture, initial encounter ([facility]) [S72.002A]  Thigh hematoma, left, initial encounter [S70.12XA]  Symptomatic anemia [D64.9]  Avulsion fracture of left hip, closed, initial encounter ([facility]) [S72.002A]       Precautions: Restrictions/Precautions: Fall Risk, Weight Bearing (avoid abduction movements LLE per ortho surgery)   Lower Extremity Weight Bearing Restrictions  Left Lower Extremity Weight Bearing: Weight Bearing As Tolerated                  ASSESSMENT :   Patient is a 74 y/o male admitted 12/8 s/p suspected fall at [facility] with resultant comminuted L greater trochanter avulsion fx with associated L lateral hamstring intramuscular hematoma.  Patient cleared by  EXTRACTION W/ INTRAOCULAR LENS IMPLANT Right     NASAL TURBINATE REDUCTION Bilateral     NEUROLOGICAL SURGERY      deep brain stimulator, with remote    OTHER SURGICAL HISTORY      epidural steroid injections     RETINAL DETACHMENT SURGERY Right     THYROIDECTOMY, PARTIAL Left 01/2020    THYROIDECTOMY, PARTIAL Right 10/2019    TOTAL KNEE ARTHROPLASTY Right 2013       Home Situation:  Social/Functional History  Type of Home: Assisted living ([facility])  Additional Comments: Pt was an inconsistent historian this date; unclear level of A req'd for ADLs or mobility level; pt reports utilizing RW for mobility, but with waxing and waning confusion throughout; no family present for clarification    Cognitive/Behavioral Status:  Orientation  Overall Orientation Status: Impaired  Orientation Level: Disoriented to situation;Disoriented to time;Oriented to person;Disoriented to place (oriented to hospital with cuing and choices--not oriented to name of hospital; oriented to city)  Cognition  Overall Cognitive Status: Exceptions  Arousal/Alertness: Delayed responses to stimuli;Impaired  Following Commands: Impaired;Follows one step commands with increased time;Follows one step commands with repetition  Attention Span: Impaired;Attends with cues to redirect;Difficulty attending to directions;Difficulty dividing attention  Memory: Impaired;Decreased recall of precautions;Decreased short term memory;Decreased recall of recent events;Decreased recall of biographical Information  Safety Judgement: Impaired;Decreased awareness of need for safety;Decreased awareness of need for assistance  Problem Solving: Impaired;Decreased awareness of errors  Insights: Not aware of deficits  Initiation: Requires cues for all  Sequencing: Requires cues for all  Cognition Comment: waxing and waning confusion in session; highly impulsive, restless easily redirectable; intermittent inappropriate responses to basic questions    Skin:

## 2024-12-09 NOTE — PROGRESS NOTES
Bedside shift change report given to Tino RN (oncoming nurse) by Yvette RN (offgoing nurse). Report included the following information Nurse Handoff Report.

## 2024-12-09 NOTE — PROGRESS NOTES
Physical Therapy  12/09/2024    Received orders for PT evaluation.  Performed chart review.  Patient admitted s/p fall with L sided weakness, found to have a communicated L greater trochanter avulsion fracture.  Currently on bedrest while awaiting ortho consult.  Will hold PT evaluation at this time and follow-up once patient is cleared by ortho surgery for upright activity.    Thank you,  Marcelle Novoa, PT, DPT

## 2024-12-09 NOTE — PLAN OF CARE
Problem: Physical Therapy - Adult  Goal: By Discharge: Performs mobility at highest level of function for planned discharge setting.  See evaluation for individualized goals.  Description: FUNCTIONAL STATUS PRIOR TO ADMISSION: Pt was an inconsistent historian this date; unclear level of A req'd for ADLs or mobility level; pt reports utilizing RW for mobility, but with waxing and waning confusion throughout; no family present for clarification    HOME SUPPORT PRIOR TO ADMISSION: Pt resided at [facility]; unclear level of assistance available for mobility/ADL activities    Physical Therapy Goals  Initiated 12/9/2024  1.  Patient will move from supine to sit and sit to supine in bed with supervision/set-up within 7 day(s).    2.  Patient will perform sit to stand with minimal assistance within 7 day(s).  3.  Patient will transfer from bed to chair and chair to bed with minimal assistance using the least restrictive device within 7 day(s).  4.  Patient will ambulate with minimal assistance for 25 feet with the least restrictive device within 7 day(s).   12/9/2024 1327 by Marcelle Novoa PT  Outcome: Progressing     Problem: Occupational Therapy - Adult  Goal: By Discharge: Performs self-care activities at highest level of function for planned discharge setting.  See evaluation for individualized goals.  Description: FUNCTIONAL STATUS PRIOR TO ADMISSION:  The patient presents from [facility]. Pt is a questionable historian but reports ambulating with a RW and performing ADLs with little assistance.      Occupational Therapy Goals:  Initiated 12/9/2024  1.  Patient will perform grooming with Set-up and Supervision sitting EOB within 7 day(s).  2.  Patient will perform upper body dressing with Set-up within 7 day(s).  3.  Patient will perform toilet transfers with Moderate Assist  within 7 day(s).  4.  Patient will perform all aspects of toileting with Moderate Assist within 7 day(s).  5.  Patient

## 2024-12-10 ENCOUNTER — APPOINTMENT (OUTPATIENT)
Facility: HOSPITAL | Age: 74
DRG: 535 | End: 2024-12-10
Payer: MEDICARE

## 2024-12-10 PROBLEM — S70.12XA HEMATOMA OF LEFT THIGH: Status: ACTIVE | Noted: 2024-12-10

## 2024-12-10 PROBLEM — S72.112A CLOSED AVULSION FRACTURE OF GREATER TROCHANTER OF FEMUR, LEFT, INITIAL ENCOUNTER (HCC): Status: ACTIVE | Noted: 2024-12-10

## 2024-12-10 LAB
ABO + RH BLD: NORMAL
ANION GAP SERPL CALC-SCNC: 3 MMOL/L (ref 2–12)
BLD PROD TYP BPU: NORMAL
BLD PROD TYP BPU: NORMAL
BLOOD BANK BLOOD PRODUCT EXPIRATION DATE: NORMAL
BLOOD BANK BLOOD PRODUCT EXPIRATION DATE: NORMAL
BLOOD BANK DISPENSE STATUS: NORMAL
BLOOD BANK DISPENSE STATUS: NORMAL
BLOOD BANK ISBT PRODUCT BLOOD TYPE: 6200
BLOOD BANK ISBT PRODUCT BLOOD TYPE: 6200
BLOOD BANK PRODUCT CODE: NORMAL
BLOOD BANK PRODUCT CODE: NORMAL
BLOOD BANK UNIT TYPE AND RH: NORMAL
BLOOD BANK UNIT TYPE AND RH: NORMAL
BLOOD GROUP ANTIBODIES SERPL: NORMAL
BPU ID: NORMAL
BPU ID: NORMAL
BUN SERPL-MCNC: 16 MG/DL (ref 6–20)
BUN/CREAT SERPL: 24 (ref 12–20)
CALCIUM SERPL-MCNC: 8.5 MG/DL (ref 8.5–10.1)
CHLORIDE SERPL-SCNC: 107 MMOL/L (ref 97–108)
CO2 SERPL-SCNC: 28 MMOL/L (ref 21–32)
CREAT SERPL-MCNC: 0.67 MG/DL (ref 0.7–1.3)
CROSSMATCH RESULT: NORMAL
CROSSMATCH RESULT: NORMAL
ERYTHROCYTE [DISTWIDTH] IN BLOOD BY AUTOMATED COUNT: 15.1 % (ref 11.5–14.5)
GLUCOSE SERPL-MCNC: 87 MG/DL (ref 65–100)
HCT VFR BLD AUTO: 26.2 % (ref 36.6–50.3)
HGB BLD-MCNC: 8.7 G/DL (ref 12.1–17)
MCH RBC QN AUTO: 31.2 PG (ref 26–34)
MCHC RBC AUTO-ENTMCNC: 33.2 G/DL (ref 30–36.5)
MCV RBC AUTO: 93.9 FL (ref 80–99)
NRBC # BLD: 0 K/UL (ref 0–0.01)
NRBC BLD-RTO: 0 PER 100 WBC
PLATELET # BLD AUTO: 349 K/UL (ref 150–400)
PMV BLD AUTO: 9.5 FL (ref 8.9–12.9)
POTASSIUM SERPL-SCNC: 3.9 MMOL/L (ref 3.5–5.1)
RBC # BLD AUTO: 2.79 M/UL (ref 4.1–5.7)
SODIUM SERPL-SCNC: 138 MMOL/L (ref 136–145)
SPECIMEN EXP DATE BLD: NORMAL
UNIT DIVISION: 0
UNIT DIVISION: 0
UNIT ISSUE DATE/TIME: NORMAL
UNIT ISSUE DATE/TIME: NORMAL
WBC # BLD AUTO: 6.2 K/UL (ref 4.1–11.1)

## 2024-12-10 PROCEDURE — 2580000003 HC RX 258: Performed by: INTERNAL MEDICINE

## 2024-12-10 PROCEDURE — 6370000000 HC RX 637 (ALT 250 FOR IP): Performed by: INTERNAL MEDICINE

## 2024-12-10 PROCEDURE — 6370000000 HC RX 637 (ALT 250 FOR IP): Performed by: HOSPITALIST

## 2024-12-10 PROCEDURE — 85027 COMPLETE CBC AUTOMATED: CPT

## 2024-12-10 PROCEDURE — 2060000000 HC ICU INTERMEDIATE R&B

## 2024-12-10 PROCEDURE — 92526 ORAL FUNCTION THERAPY: CPT

## 2024-12-10 PROCEDURE — 99232 SBSQ HOSP IP/OBS MODERATE 35: CPT | Performed by: PHYSICIAN ASSISTANT

## 2024-12-10 PROCEDURE — 6360000004 HC RX CONTRAST MEDICATION: Performed by: RADIOLOGY

## 2024-12-10 PROCEDURE — 80048 BASIC METABOLIC PNL TOTAL CA: CPT

## 2024-12-10 PROCEDURE — 71260 CT THORAX DX C+: CPT

## 2024-12-10 RX ORDER — IOPAMIDOL 612 MG/ML
100 INJECTION, SOLUTION INTRAVASCULAR
Status: COMPLETED | OUTPATIENT
Start: 2024-12-10 | End: 2024-12-10

## 2024-12-10 RX ADMIN — OXYCODONE HYDROCHLORIDE AND ACETAMINOPHEN 500 MG: 500 TABLET ORAL at 08:51

## 2024-12-10 RX ADMIN — CARBIDOPA AND LEVODOPA 1 TABLET: 50; 200 TABLET, EXTENDED RELEASE ORAL at 22:05

## 2024-12-10 RX ADMIN — IOPAMIDOL 100 ML: 612 INJECTION, SOLUTION INTRAVENOUS at 11:06

## 2024-12-10 RX ADMIN — LIOTHYRONINE SODIUM 5 MCG: 5 TABLET ORAL at 14:20

## 2024-12-10 RX ADMIN — LIOTHYRONINE SODIUM 10 MCG: 5 TABLET ORAL at 08:44

## 2024-12-10 RX ADMIN — CARBIDOPA, LEVODOPA AND ENTACAPONE 1 TABLET: 31.25; 200; 125 TABLET, FILM COATED ORAL at 18:08

## 2024-12-10 RX ADMIN — SODIUM CHLORIDE, PRESERVATIVE FREE 10 ML: 5 INJECTION INTRAVENOUS at 08:52

## 2024-12-10 RX ADMIN — CARBIDOPA AND LEVODOPA 1 TABLET: 25; 100 TABLET ORAL at 08:50

## 2024-12-10 RX ADMIN — CARBIDOPA AND LEVODOPA 1 TABLET: 50; 200 TABLET, EXTENDED RELEASE ORAL at 05:40

## 2024-12-10 RX ADMIN — DOCUSATE SODIUM 100 MG: 100 CAPSULE, LIQUID FILLED ORAL at 19:49

## 2024-12-10 RX ADMIN — CARBIDOPA AND LEVODOPA 1 TABLET: 25; 100 TABLET ORAL at 19:49

## 2024-12-10 RX ADMIN — MIRTAZAPINE 45 MG: 15 TABLET, FILM COATED ORAL at 19:50

## 2024-12-10 RX ADMIN — LEVOTHYROXINE SODIUM 25 MCG: 0.03 TABLET ORAL at 19:49

## 2024-12-10 RX ADMIN — SODIUM CHLORIDE, PRESERVATIVE FREE 10 ML: 5 INJECTION INTRAVENOUS at 19:50

## 2024-12-10 RX ADMIN — Medication 1 TABLET: at 19:51

## 2024-12-10 RX ADMIN — CARBIDOPA, LEVODOPA AND ENTACAPONE 1 TABLET: 31.25; 200; 125 TABLET, FILM COATED ORAL at 14:20

## 2024-12-10 RX ADMIN — Medication 5000 UNITS: at 08:47

## 2024-12-10 RX ADMIN — LIOTHYRONINE SODIUM 5 MCG: 5 TABLET ORAL at 19:50

## 2024-12-10 RX ADMIN — CARBIDOPA, LEVODOPA AND ENTACAPONE 1 TABLET: 31.25; 200; 125 TABLET, FILM COATED ORAL at 16:14

## 2024-12-10 RX ADMIN — CARBIDOPA AND LEVODOPA 1 TABLET: 50; 200 TABLET, EXTENDED RELEASE ORAL at 02:03

## 2024-12-10 RX ADMIN — CARBIDOPA, LEVODOPA AND ENTACAPONE 1 TABLET: 31.25; 200; 125 TABLET, FILM COATED ORAL at 10:30

## 2024-12-10 RX ADMIN — LEVOTHYROXINE SODIUM 25 MCG: 0.03 TABLET ORAL at 08:52

## 2024-12-10 RX ADMIN — CARBIDOPA, LEVODOPA AND ENTACAPONE 1 TABLET: 31.25; 200; 125 TABLET, FILM COATED ORAL at 19:51

## 2024-12-10 RX ADMIN — PAROXETINE HYDROCHLORIDE 30 MG: 20 TABLET, FILM COATED ORAL at 19:50

## 2024-12-10 RX ADMIN — SENNOSIDES AND DOCUSATE SODIUM 1 TABLET: 50; 8.6 TABLET ORAL at 08:50

## 2024-12-10 RX ADMIN — DOCUSATE SODIUM 100 MG: 100 CAPSULE, LIQUID FILLED ORAL at 08:50

## 2024-12-10 RX ADMIN — RAMELTEON 8 MG: 8 TABLET ORAL at 19:51

## 2024-12-10 RX ADMIN — CARBIDOPA AND LEVODOPA 1 TABLET: 25; 100 TABLET ORAL at 14:20

## 2024-12-10 RX ADMIN — CARBIDOPA, LEVODOPA AND ENTACAPONE 1 TABLET: 31.25; 200; 125 TABLET, FILM COATED ORAL at 12:34

## 2024-12-10 RX ADMIN — SENNOSIDES AND DOCUSATE SODIUM 1 TABLET: 50; 8.6 TABLET ORAL at 19:55

## 2024-12-10 NOTE — PROGRESS NOTES
ORTHOPAEDIC NOTE    Subjective:       Fan Vang is a 73 y.o. male with past medical history significant for Parkinson's disease status post DBS placement, history of thyroid cancer, gout as well as osteoarthritis is seen at bedside for a left hip greater trochanter fracture.  Patient states that he has a history of falls and fell in recent days but does not remember the circumstances of the fall.  He denies pain at this time.  He was brought in by EMS for with complaints of left-sided weakness and worked up for stroke.  He had significant swelling and bruising noted in his left lower extremity and CT was obtained showing greater trochanter fracture.      Patient states that he has no pain in his left hip or leg.  He has known end-stage degenerative changes in his left knee and a right total knee arthroplasty in the distant past is from [place].  He denies new onset tingling or numbness.     At the time of evaluation, PT had attempted to mobilize patient.  He was able to stand with assistance but unable to demonstrate ambulation, unfortunately he reports no pain and is unable to describe symptoms in detail.     Patient Active Problem List    Diagnosis Date Noted    Closed left hip fracture, initial encounter (HCC) 12/08/2024    Encephalopathy acute 11/01/2024    Depression 11/01/2024    Degeneration of intervertebral disc of lumbar region with discogenic back pain and lower extremity pain 11/01/2024    Confusion and disorientation 10/31/2024    Parkinson's disease ([facility])     Arthritis     S/P deep brain stimulator placement     Gout     Thyroid cancer ([facility])      No family history on file.   Social History     Tobacco Use    Smoking status: Former    Smokeless tobacco: Never   Substance Use Topics    Alcohol use: Yes     Alcohol/week: 7.0 standard drinks of alcohol     Comment: ocsaionally     Past Medical History:   Diagnosis Date    Arthritis     back and left knee    Dependence on walking stick      bilateral/single hiking stick if walking distance    Gout     Parkinson's disease     S/P deep brain stimulator placement     pt has a remote for this device    Thyroid cancer (HCC)     radioactive iodine 4/2020, and partial thyroidectomy    Uses brace     back brace      Past Surgical History:   Procedure Laterality Date    CATARACT EXTRACTION W/ INTRAOCULAR LENS IMPLANT Right     NASAL TURBINATE REDUCTION Bilateral     NEUROLOGICAL SURGERY      deep brain stimulator, with remote    OTHER SURGICAL HISTORY      epidural steroid injections     RETINAL DETACHMENT SURGERY Right     THYROIDECTOMY, PARTIAL Left 01/2020    THYROIDECTOMY, PARTIAL Right 10/2019    TOTAL KNEE ARTHROPLASTY Right 2013      Prior to Admission medications    Medication Sig Start Date End Date Taking? Authorizing Provider   liothyronine (CYTOMEL) 5 MCG tablet Take 2 tablets by mouth daily Take 10 mcg in the morning   Yes Cliff Ham MD   carbidopa-levodopa (SINEMET CR)  MG per extended release tablet Take 1 tablet by mouth See Admin Instructions Takes 1 tablet by mouth 3 times a day at times: 2200, 0200, 0500   Yes Cliff Ham MD   divalproex (DEPAKOTE) 125 MG DR tablet Take 1 tablet by mouth 2 times daily   Yes Cliff Ham MD   levothyroxine (SYNTHROID) 25 MCG tablet Take 1 tablet by mouth in the morning and at bedtime   Yes Cliff Ham MD   liothyronine (CYTOMEL) 5 MCG tablet Take 1 tablet by mouth in the morning, at noon, and at bedtime Takes 1 pill in the afternoon (5 mcg) and 1 tablet at bedtime (qHS) (additionally, takes 10 mcg in the AM)   Yes Cliff Ham MD   vitamin C (ASCORBIC ACID) 500 MG tablet Take 1 tablet by mouth daily   Yes Cliff Ham MD   carbidopa-levodopa (SINEMET)  MG per tablet Take 1 tablet by mouth 3 times daily 1 Tablet three times daily   Yes Cliff Ham MD   docusate sodium (COLACE) 100 MG capsule Take 1 capsule by mouth 2 times daily

## 2024-12-10 NOTE — PROGRESS NOTES
Occupational Therapy    Chart reviewed in prep for skilled OT treatment; however, pt FÁTIMA for CT.  Will defer and continue to follow.    Thank you,  Matthew Kerley, OT

## 2024-12-10 NOTE — PLAN OF CARE
Problem: SLP Adult - Impaired Swallowing  Goal: By Discharge: Advance to least restrictive diet without signs or symptoms of aspiration for planned discharge setting.  See evaluation for individualized goals.  Outcome: Adequate for Discharge   Speech LAnguage Pathology TREATMENT/DISCHARGE    Patient: Fan Vang (73 y.o. male)  Date: 12/10/2024  Primary Diagnosis: Generalized weakness [R53.1]  Recurrent falls [R29.6]  Closed left hip fracture, initial encounter ([facility]) [S72.002A]  Thigh hematoma, left, initial encounter [S70.12XA]  Symptomatic anemia [D64.9]  Avulsion fracture of left hip, closed, initial encounter ([facility]) [S72.002A]       Precautions:  Fall Risk, Weight Bearing (avoid abduction movements LLE per ortho surgery)   Left Lower Extremity Weight Bearing: Weight Bearing As Tolerated              ASSESSMENT :  Patient tolerating soft and bite sized diet/ thin liquids; however, not interested in continuing on softer solids. Observed patient at the end of lunch tray. Patient irritable on arrival to room, wants to leave the hospital. Wife at bedside reporting baseline diet is regular/thin and someone assists with cutting due to visual deficits. Discussed diet consistency options and in agreement for regular consistency with chopped meats.     Patient will be discharged from skilled speech-language pathology services at this time.     PLAN :  Recommendations and Planned Interventions:  Diet: regular diet/ thin liquids with chopped meats, cut other foods as needed  Tray set up and assist with meals as needed  Fully upright positioning with all PO  Meds 1 at a time as tolerated      Acute SLP Services: No, patient will be discharged from acute skilled speech-language pathology at this time.  Discharge Recommendations: No, additional SLP treatment not indicated at discharge     SUBJECTIVE:   Patient irritable, wants to leave the hospital    OBJECTIVE:     Past Medical History:   Diagnosis Date    Arthritis      back and left knee    Dependence on walking stick     bilateral/single hiking stick if walking distance    Gout     Parkinson's disease     S/P deep brain stimulator placement     pt has a remote for this device    Thyroid cancer (HCC)     radioactive iodine 4/2020, and partial thyroidectomy    Uses brace     back brace     Past Surgical History:   Procedure Laterality Date    CATARACT EXTRACTION W/ INTRAOCULAR LENS IMPLANT Right     NASAL TURBINATE REDUCTION Bilateral     NEUROLOGICAL SURGERY      deep brain stimulator, with remote    OTHER SURGICAL HISTORY      epidural steroid injections     RETINAL DETACHMENT SURGERY Right     THYROIDECTOMY, PARTIAL Left 01/2020    THYROIDECTOMY, PARTIAL Right 10/2019    TOTAL KNEE ARTHROPLASTY Right 2013     Prior Level of Function/Home Situation:   Social/Functional History  Lives With: Other (comment)  Type of Home: Assisted living  Bathroom Shower/Tub: Walk-in shower  Bathroom Toilet: Handicap height  Bathroom Equipment: Grab bars in shower  Bathroom Accessibility: Accessible  Receives Help From: Other (comment) (staff at [facility])  Prior Level of Assist for ADLs: Needs assistance  Toileting: Needs assistance  Prior Level of Assist for Homemaking: Needs assistance  Homemaking Responsibilities: No  Prior Level of Assist for Transfers: Needs assistance  Active : No  Occupation: Retired  Additional Comments: Pt was an inconsistent historian this date; unclear level of A req'd for ADLs or mobility level; pt reports utilizing RW for mobility, but with waxing and waning confusion throughout; no family present for clarification    Cognitive and Communication Status:  Neurologic State: Alert    Cognition: Follows commands    Dysphagia:  Oral Assessment:  Oral Motor   Labial: No impairment  Dentition: Natural  Oral Hygiene: Moist  Lingual: No impairment  Mandible: No impairment  P.O. Trials:  PO Trials  Neuromuscular Estim Used: No  Assessment Method(s):

## 2024-12-10 NOTE — PLAN OF CARE
day(s).  3.  Patient will perform toilet transfers with Moderate Assist  within 7 day(s).  4.  Patient will perform all aspects of toileting with Moderate Assist within 7 day(s).  5.  Patient will participate in upper extremity therapeutic exercise/activities with Supervision for 5 minutes within 7 day(s).    6.  Patient will utilize energy conservation techniques during functional activities with verbal cues within 7 day(s).   12/9/2024 1441 by Nupur Pompa, OT  Outcome: Progressing

## 2024-12-10 NOTE — PROGRESS NOTES
ORTHO PROGRESS NOTE    December 10, 2024  Admit Date:   2024    Post Op day: * No surgery found *    Subjective:    Fan Vang patient again denies pain about the left hip.  States he is tired and would like to go home.  Has worked minimally with therapy thus far secondary to other evaluations.  No new orthopedic complaints.  Tolerating diet    PT/OT:   Gait:                    Vital Signs:    Patient Vitals for the past 8 hrs:   BP Temp Temp src Pulse Resp SpO2   12/10/24 1400 -- -- -- 64 -- --   12/10/24 1200 (!) 166/91 98 °F (36.7 °C) Oral 75 14 97 %   12/10/24 1015 (!) 162/81 98.7 °F (37.1 °C) Oral 67 19 95 %   12/10/24 1000 -- -- -- 68 -- --   12/10/24 0800 (!) 162/81 98.7 °F (37.1 °C) Oral 65 18 98 %   12/10/24 0655 -- 97.4 °F (36.3 °C) Oral -- -- --     Temp (24hrs), Av °F (36.7 °C), Min:97.3 °F (36.3 °C), Max:98.7 °F (37.1 °C)      Pain Control:        Meds:    Current Facility-Administered Medications   Medication Dose Route Frequency    0.9 % sodium chloride infusion   IntraVENous PRN    0.9 % sodium chloride infusion   IntraVENous PRN    sodium chloride flush 0.9 % injection 5-40 mL  5-40 mL IntraVENous 2 times per day    sodium chloride flush 0.9 % injection 5-40 mL  5-40 mL IntraVENous PRN    0.9 % sodium chloride infusion   IntraVENous PRN    ondansetron (ZOFRAN) injection 4 mg  4 mg IntraVENous Q6H PRN    sennosides-docusate sodium (SENOKOT-S) 8.6-50 MG tablet 1 tablet  1 tablet Oral BID    morphine (PF) injection 2 mg  2 mg IntraVENous Q4H PRN    naloxone (NARCAN) injection 0.4 mg  0.4 mg IntraVENous PRN    polyethylene glycol (GLYCOLAX) packet 17 g  17 g Oral Daily    acetaminophen (TYLENOL) tablet 1,000 mg  1,000 mg Oral TID PRN    carbidopa-levodopa (SINEMET CR)  MG per extended release tablet 1 tablet  1 tablet Oral 3 times per day    carbidopa-levodopa (SINEMET)  MG per tablet 1 tablet  1 tablet Oral TID    carbidopa-levodopa-entacapone (STALEVO 125) 31.-200 MG per  tablet 1 tablet - PATIENT SUPPLIED (Patient Supplied)  1 tablet Oral 7 times per day    [Held by provider] divalproex (DEPAKOTE) DR tablet 125 mg  125 mg Oral BID    docusate sodium (COLACE) capsule 100 mg  100 mg Oral BID    Levodopa CAPS 84 mg (Patient Supplied)  84 mg Inhalation TID PRN    levothyroxine (SYNTHROID) tablet 25 mcg  25 mcg Oral BID    liothyronine (CYTOMEL) tablet 10 mcg  10 mcg Oral Daily    liothyronine (CYTOMEL) tablet 5 mcg  5 mcg Oral 2 times per day    mirtazapine (REMERON) tablet 45 mg  45 mg Oral Nightly    PARoxetine (PAXIL) tablet 30 mg  30 mg Oral Nightly    ramelteon (ROZEREM) tablet 8 mg - PATIENT SUPPLIED MEDICATION (Patient Supplied)  8 mg Oral Nightly    ascorbic acid (VITAMIN C) tablet 500 mg  500 mg Oral Daily    Vitamin D (CHOLECALCIFEROL) tablet 5,000 Units  5,000 Units Oral Daily    Melatonin-Pyridoxine ER 10 MG 1 tablet - PATIENT SUPPLIED MEDICATION (Patient Supplied)  1 tablet Oral Nightly       LAB:    Recent Labs     12/08/24  1423 12/09/24  0033 12/10/24  0509   HCT 20.2*   < > 26.2*   HGB 6.6*   < > 8.7*   INR 1.0  --   --     < > = values in this interval not displayed.       Transfuse PRBC's:      Assessment & Physician's Comment:  Neurovascular checks within normal limits  Orientation:  Oriented  Awake and alert sitting up in bed; NAD; leg lengths equal on exam.  Logroll of bilateral hips is pain-free.  Patient able to flex through hip and knee on the left side with no discomfort.  Moves her ankles and toes to command.  Distal sensory function grossly intact.  Distal pulses palpable    Principal Problem:    Avulsion fracture of left hip, closed, initial encounter ([facility])  Resolved Problems:    * No resolved hospital problems. *      Plan:    Left femur greater trochanter fracture with associated left hamstring intramuscular hematoma    Will maintain course of conservative management of his fracture.  PT/OT for mobilization; WBAT through left lower extremity while avoiding

## 2024-12-10 NOTE — CARE COORDINATION
Transition of Care Plan:    RUR: 19%  Prior Level of Functioning: [facility] (admitted 12/8/24)  Disposition: To [facility]  MILES: 12/13/24  If SNF or IPR: Date FOC offered: 12/10/24  Date FOC received: 12/10/24  Accepting facility: Pending  Date authorization started with reference number: N/A  Date authorization received and expires:   Follow up appointments:   DME needed: None  Transportation at discharge: BLS (severe Parkinsons)  IM/IMM Medicare/ letter given: 1st 12/8/24  Is patient a  and connected with [facility]?    If yes, was  transfer form completed and [facility] notified?   Caregiver Contact: spouse Rianna Vang [phone]  Discharge Caregiver contacted prior to discharge?   Care Conference needed?   Barriers to discharge:     CM noted that PT, OT, and the hospitalist have recommended SNF rehab. CM met with patient's wife at the bedside who agreed with the recommendation. CM offered choice of providers. Patient selected [facility].  CM sent referral via EPIC, and response is pending.

## 2024-12-10 NOTE — PLAN OF CARE
Problem: Safety - Adult  Goal: Free from fall injury  12/10/2024 1350 by Satinder Srivastava RN  Outcome: Progressing     Problem: Discharge Planning  Goal: Discharge to home or other facility with appropriate resources  Outcome: Progressing  Flowsheets (Taken 12/10/2024 0830)  Discharge to home or other facility with appropriate resources: Identify barriers to discharge with patient and caregiver     Problem: Skin/Tissue Integrity  Goal: Absence of new skin breakdown  Description: 1.  Monitor for areas of redness and/or skin breakdown  2.  Assess vascular access sites hourly  3.  Every 4-6 hours minimum:  Change oxygen saturation probe site  4.  Every 4-6 hours:  If on nasal continuous positive airway pressure, respiratory therapy assess nares and determine need for appliance change or resting period.  Outcome: Progressing

## 2024-12-10 NOTE — PROGRESS NOTES
Physical Therapy  12/10/2024    PT continues to follow patient.  Attempted to see patient for PT treatment session, however patient preparing to go off unit for CT.  Not available for PT.  Will defer at this time and follow-up as patient is medically appropriate and available for PT.    Thank you,  Marcelle Novoa, PT, DPT

## 2024-12-10 NOTE — PROGRESS NOTES
BP:    Pulse:    Resp:    Temp: 97.4 °F (36.3 °C)   SpO2:          Intake/Output Summary (Last 24 hours) at 12/10/2024 0819  Last data filed at 12/10/2024 0444  Gross per 24 hour   Intake 2748.25 ml   Output 1100 ml   Net 1648.25 ml        Physical Examination:     I had a face to face encounter with this patient and independently examined them on 12/10/2024 as outlined below:          General : awake, NAD  HEENT: right eye patch, ecchymosis noticed , moist mucus membrane  Neck: supple, no JVD, no meningeal signs  Chest: Clear to auscultation bilaterally   CVS: S1 S2 heard, Capillary refill less than 2 seconds  Abd: soft/ non tender, non distended, BS physiological,   Ext: left leg swelling, large hematoma noticed whole leg from the proximal thigh through the foot with extensive ecchymosis noted throughout the leg  Logroll of the hip is pain-free  Neuro/Psych: pleasant mood 5/5 bilat upper ext, 5/5 right leg, 5-/6 left   Skin: warm            Data Review:    Review and/or order of clinical lab test    I have independently reviewed and interpreted patient's lab and all other diagnostic data    Notes reviewed from all clinical/nonclinical/nursing services involved in patient's clinical care. Care coordination discussions were held with appropriate clinical/nonclinical/ nursing providers based on care coordination needs.     Labs:     Recent Labs     12/09/24  1520 12/10/24  0509   WBC 7.5 6.2   HGB 9.4* 8.7*   HCT 28.3* 26.2*    349     Recent Labs     12/08/24  1423 12/09/24  0533 12/10/24  0509    141 138   K 3.6 3.6 3.9    110* 107   CO2 29 28 28   BUN 25* 18 16     Recent Labs     12/08/24  1423   ALT 11*   GLOB 3.3     Recent Labs     12/08/24  1423   INR 1.0      Recent Labs     12/08/24  1423   TIBC 207*      No results found for: \"RBCF\"   No results for input(s): \"PH\", \"PCO2\", \"PO2\" in the last 72 hours.  No results for input(s): \"CPK\" in the last 72 hours.    Invalid input(s): \"CPKMB\",  SUPPLIED MEDICATION (Patient Supplied)  8 mg Oral Nightly    ascorbic acid (VITAMIN C) tablet 500 mg  500 mg Oral Daily    Vitamin D (CHOLECALCIFEROL) tablet 5,000 Units  5,000 Units Oral Daily    Melatonin-Pyridoxine ER 10 MG 1 tablet - PATIENT SUPPLIED MEDICATION (Patient Supplied)  1 tablet Oral Nightly     ______________________________________________________________________  EXPECTED LENGTH OF STAY: 5  ACTUAL LENGTH OF STAY:          2                 Jovany Mcdonald MD

## 2024-12-11 ENCOUNTER — APPOINTMENT (OUTPATIENT)
Facility: HOSPITAL | Age: 74
DRG: 535 | End: 2024-12-11
Payer: MEDICARE

## 2024-12-11 LAB
AMMONIA PLAS-SCNC: 11 UMOL/L
ANION GAP SERPL CALC-SCNC: 4 MMOL/L (ref 2–12)
BUN SERPL-MCNC: 14 MG/DL (ref 6–20)
BUN/CREAT SERPL: 21 (ref 12–20)
CALCIUM SERPL-MCNC: 8.8 MG/DL (ref 8.5–10.1)
CHLORIDE SERPL-SCNC: 107 MMOL/L (ref 97–108)
CO2 SERPL-SCNC: 26 MMOL/L (ref 21–32)
COMMENT:: NORMAL
CREAT SERPL-MCNC: 0.67 MG/DL (ref 0.7–1.3)
D DIMER PPP FEU-MCNC: 7.38 MG/L FEU (ref 0–0.65)
EKG ATRIAL RATE: 75 BPM
EKG DIAGNOSIS: NORMAL
EKG P-R INTERVAL: 180 MS
EKG Q-T INTERVAL: 404 MS
EKG QRS DURATION: 90 MS
EKG QTC CALCULATION (BAZETT): 451 MS
EKG R AXIS: 19 DEGREES
EKG T AXIS: 44 DEGREES
EKG VENTRICULAR RATE: 75 BPM
ERYTHROCYTE [DISTWIDTH] IN BLOOD BY AUTOMATED COUNT: 14.9 % (ref 11.5–14.5)
GLUCOSE BLD STRIP.AUTO-MCNC: 116 MG/DL (ref 65–117)
GLUCOSE SERPL-MCNC: 84 MG/DL (ref 65–100)
HCT VFR BLD AUTO: 28.3 % (ref 36.6–50.3)
HGB BLD-MCNC: 9.3 G/DL (ref 12.1–17)
MCH RBC QN AUTO: 31.5 PG (ref 26–34)
MCHC RBC AUTO-ENTMCNC: 32.9 G/DL (ref 30–36.5)
MCV RBC AUTO: 95.9 FL (ref 80–99)
NRBC # BLD: 0 K/UL (ref 0–0.01)
NRBC BLD-RTO: 0 PER 100 WBC
PLATELET # BLD AUTO: 431 K/UL (ref 150–400)
PMV BLD AUTO: 9.5 FL (ref 8.9–12.9)
POTASSIUM SERPL-SCNC: 3.5 MMOL/L (ref 3.5–5.1)
RBC # BLD AUTO: 2.95 M/UL (ref 4.1–5.7)
SERVICE CMNT-IMP: NORMAL
SODIUM SERPL-SCNC: 137 MMOL/L (ref 136–145)
SPECIMEN HOLD: NORMAL
TROPONIN I SERPL HS-MCNC: 13 NG/L (ref 0–76)
WBC # BLD AUTO: 7.9 K/UL (ref 4.1–11.1)

## 2024-12-11 PROCEDURE — 93010 ELECTROCARDIOGRAM REPORT: CPT | Performed by: SPECIALIST

## 2024-12-11 PROCEDURE — 82962 GLUCOSE BLOOD TEST: CPT

## 2024-12-11 PROCEDURE — 93005 ELECTROCARDIOGRAM TRACING: CPT | Performed by: FAMILY MEDICINE

## 2024-12-11 PROCEDURE — 6370000000 HC RX 637 (ALT 250 FOR IP): Performed by: INTERNAL MEDICINE

## 2024-12-11 PROCEDURE — 36415 COLL VENOUS BLD VENIPUNCTURE: CPT

## 2024-12-11 PROCEDURE — 80048 BASIC METABOLIC PNL TOTAL CA: CPT

## 2024-12-11 PROCEDURE — 6370000000 HC RX 637 (ALT 250 FOR IP): Performed by: HOSPITALIST

## 2024-12-11 PROCEDURE — 85379 FIBRIN DEGRADATION QUANT: CPT

## 2024-12-11 PROCEDURE — 6360000004 HC RX CONTRAST MEDICATION: Performed by: RADIOLOGY

## 2024-12-11 PROCEDURE — 82140 ASSAY OF AMMONIA: CPT

## 2024-12-11 PROCEDURE — 2060000000 HC ICU INTERMEDIATE R&B

## 2024-12-11 PROCEDURE — 84484 ASSAY OF TROPONIN QUANT: CPT

## 2024-12-11 PROCEDURE — 71045 X-RAY EXAM CHEST 1 VIEW: CPT

## 2024-12-11 PROCEDURE — 2580000003 HC RX 258: Performed by: INTERNAL MEDICINE

## 2024-12-11 PROCEDURE — 85027 COMPLETE CBC AUTOMATED: CPT

## 2024-12-11 PROCEDURE — 71275 CT ANGIOGRAPHY CHEST: CPT

## 2024-12-11 RX ORDER — IOPAMIDOL 755 MG/ML
100 INJECTION, SOLUTION INTRAVASCULAR
Status: COMPLETED | OUTPATIENT
Start: 2024-12-11 | End: 2024-12-11

## 2024-12-11 RX ADMIN — CARBIDOPA, LEVODOPA AND ENTACAPONE 1 TABLET: 31.25; 200; 125 TABLET, FILM COATED ORAL at 08:36

## 2024-12-11 RX ADMIN — CARBIDOPA AND LEVODOPA 1 TABLET: 25; 100 TABLET ORAL at 08:35

## 2024-12-11 RX ADMIN — SENNOSIDES AND DOCUSATE SODIUM 1 TABLET: 50; 8.6 TABLET ORAL at 23:36

## 2024-12-11 RX ADMIN — LIOTHYRONINE SODIUM 5 MCG: 5 TABLET ORAL at 23:36

## 2024-12-11 RX ADMIN — IOPAMIDOL 80 ML: 755 INJECTION, SOLUTION INTRAVENOUS at 14:32

## 2024-12-11 RX ADMIN — CARBIDOPA AND LEVODOPA 1 TABLET: 50; 200 TABLET, EXTENDED RELEASE ORAL at 02:29

## 2024-12-11 RX ADMIN — CARBIDOPA AND LEVODOPA 1 TABLET: 50; 200 TABLET, EXTENDED RELEASE ORAL at 23:41

## 2024-12-11 RX ADMIN — CARBIDOPA, LEVODOPA AND ENTACAPONE 1 TABLET: 31.25; 200; 125 TABLET, FILM COATED ORAL at 20:22

## 2024-12-11 RX ADMIN — SODIUM CHLORIDE, PRESERVATIVE FREE 10 ML: 5 INJECTION INTRAVENOUS at 20:22

## 2024-12-11 RX ADMIN — SENNOSIDES AND DOCUSATE SODIUM 1 TABLET: 50; 8.6 TABLET ORAL at 08:35

## 2024-12-11 RX ADMIN — CARBIDOPA, LEVODOPA AND ENTACAPONE 1 TABLET: 31.25; 200; 125 TABLET, FILM COATED ORAL at 12:29

## 2024-12-11 RX ADMIN — CARBIDOPA, LEVODOPA AND ENTACAPONE 1 TABLET: 31.25; 200; 125 TABLET, FILM COATED ORAL at 15:03

## 2024-12-11 RX ADMIN — LIOTHYRONINE SODIUM 5 MCG: 5 TABLET ORAL at 15:03

## 2024-12-11 RX ADMIN — CARBIDOPA AND LEVODOPA 1 TABLET: 50; 200 TABLET, EXTENDED RELEASE ORAL at 05:02

## 2024-12-11 RX ADMIN — OXYCODONE HYDROCHLORIDE AND ACETAMINOPHEN 500 MG: 500 TABLET ORAL at 08:34

## 2024-12-11 RX ADMIN — Medication 5000 UNITS: at 08:35

## 2024-12-11 RX ADMIN — CARBIDOPA, LEVODOPA AND ENTACAPONE 1 TABLET: 31.25; 200; 125 TABLET, FILM COATED ORAL at 17:06

## 2024-12-11 RX ADMIN — LEVOTHYROXINE SODIUM 25 MCG: 0.03 TABLET ORAL at 23:36

## 2024-12-11 RX ADMIN — PAROXETINE HYDROCHLORIDE 30 MG: 20 TABLET, FILM COATED ORAL at 23:35

## 2024-12-11 RX ADMIN — POLYETHYLENE GLYCOL 3350 17 G: 17 POWDER, FOR SOLUTION ORAL at 08:35

## 2024-12-11 RX ADMIN — LIOTHYRONINE SODIUM 10 MCG: 5 TABLET ORAL at 08:35

## 2024-12-11 RX ADMIN — CARBIDOPA, LEVODOPA AND ENTACAPONE 1 TABLET: 31.25; 200; 125 TABLET, FILM COATED ORAL at 18:24

## 2024-12-11 RX ADMIN — CARBIDOPA AND LEVODOPA 1 TABLET: 25; 100 TABLET ORAL at 23:36

## 2024-12-11 RX ADMIN — CARBIDOPA AND LEVODOPA 1 TABLET: 25; 100 TABLET ORAL at 15:03

## 2024-12-11 RX ADMIN — RAMELTEON 8 MG: 8 TABLET ORAL at 23:36

## 2024-12-11 RX ADMIN — SODIUM CHLORIDE, PRESERVATIVE FREE 10 ML: 5 INJECTION INTRAVENOUS at 08:38

## 2024-12-11 RX ADMIN — LEVOTHYROXINE SODIUM 25 MCG: 0.03 TABLET ORAL at 08:34

## 2024-12-11 RX ADMIN — DOCUSATE SODIUM 100 MG: 100 CAPSULE, LIQUID FILLED ORAL at 23:36

## 2024-12-11 RX ADMIN — MIRTAZAPINE 45 MG: 15 TABLET, FILM COATED ORAL at 23:35

## 2024-12-11 RX ADMIN — Medication 1 TABLET: at 23:37

## 2024-12-11 RX ADMIN — CARBIDOPA, LEVODOPA AND ENTACAPONE 1 TABLET: 31.25; 200; 125 TABLET, FILM COATED ORAL at 10:13

## 2024-12-11 RX ADMIN — DOCUSATE SODIUM 100 MG: 100 CAPSULE, LIQUID FILLED ORAL at 08:35

## 2024-12-11 NOTE — PROGRESS NOTES
1125: Nurse at bedside. Pt complaining of chest pain, pt having increased work of breathing and BP being 169/92.     1130: EKG completed. Provider made aware of situation and patients BP. Orders placed.     1157: provider at bedside. No new orders, waiting for labs to result and x -ray. Will continue to monitor.

## 2024-12-11 NOTE — PLAN OF CARE
Problem: Safety - Adult  Goal: Free from fall injury  12/10/2024 2356 by Rosenda Swann RN  Outcome: Progressing  12/10/2024 1350 by Satinder Srivastava RN  Outcome: Progressing     Problem: Discharge Planning  Goal: Discharge to home or other facility with appropriate resources  12/10/2024 2356 by Rosenda Swann RN  Outcome: Progressing  12/10/2024 1350 by Satinder Srivastava RN  Outcome: Progressing  Flowsheets (Taken 12/10/2024 0830)  Discharge to home or other facility with appropriate resources: Identify barriers to discharge with patient and caregiver     Problem: Skin/Tissue Integrity  Goal: Absence of new skin breakdown  Description: 1.  Monitor for areas of redness and/or skin breakdown  2.  Assess vascular access sites hourly  3.  Every 4-6 hours minimum:  Change oxygen saturation probe site  4.  Every 4-6 hours:  If on nasal continuous positive airway pressure, respiratory therapy assess nares and determine need for appliance change or resting period.  12/10/2024 1350 by Satinder Srivastava RN  Outcome: Progressing

## 2024-12-11 NOTE — CARE COORDINATION
Transition of Care Plan:     RUR: 19%  Prior Level of Functioning: [facility]/ Memory Care (admitted 12/8/24)  Disposition: To [facility]  MILES: 12/12/24  If SNF or IPR: Date FOC offered: 12/10/24  Date FOC received: 12/10/24  Accepting facility: 12/11/24 [facility]  Date authorization started with reference number: N/A  Date authorization received and expires:   Follow up appointments:   DME needed: None  Transportation at discharge: BLS (severe Parkinsons)  IM/IMM Medicare/ letter given: 1st 12/8/24  Is patient a [place] and connected with [facility]?               If yes, was  transfer form completed and [facility] notified?   Caregiver Contact: spouse Rianna Vang [phone]  Discharge Caregiver contacted prior to discharge?   Care Conference needed?   Barriers to discharge:     CM follow up. CM received Accepted response in EPIC to referral to [facility]. CM updated the AVS.

## 2024-12-11 NOTE — PROGRESS NOTES
Physical Therapy  12/11/2024    PT continues to follow patient.  Attempted to see patient for PT treatment, however per nursing staff, pt with increased lethargy and worsening mental status.  Undergoing further medical work-up at bedside.  Not medically appropriate for PT intervention at this time.  Will defer and follow-up as patient is medically appropriate and available for PT.    Thank you,  Marcelle Novoa, PT, DPT

## 2024-12-11 NOTE — PROGRESS NOTES
Hospitalist Progress Note  Grecia Kaufman MD  Answering service: [phone] OR [phone] from in house phone        Date of Service:  2024  NAME:  Fan Vang  :  1950  MRN:  [id number]      Admission Summary:    Fan Vang is a 73 y.o. male with arthritis, gout, Parkinson disease s/p deep brain stimulator placement, h/o thyroid cancer s/p radioactive iodine and partial thyroidectomy who was BIBEMS from [facility] unit with left-sided weakness. Pt LKN was 1215 today. Wife is at bedside. Pt fell but does not remember, and the fall was not reported by the facility staff per the wife. Pt denies CP, hip pain, SOB, cough. Wife reports he gets out of bed frequently and will lie on the floor. He was recently discharged from [facility] to [facility]. Pt is not on ASA or any blood thinners. Code Stroke was called. CT head showed no acute process. CTA head/neck and CTP prelim reports no LVO, no perfusion defect, but possible RUL lung mass. CXR showed mild pulmonary edema. NIS recommended no tNK or mechanical thrombectomy. CT LLE showed comminuted left greater trochanter avulsion fracture with asociated left lateral hamstring intramuscular hematoma. ASA was not given due to hematoma.        Interval history / Subjective:   Per nursing, patient has been more confused and delirious. No fever.   Had chest pain.       Assessment & Plan:      Comminuted left greater trochanter avulsion , left lateral hamstring intramuscular hematoma after suspected fall   - ortho consulted and recommended conservative management. No surgery  - PT/OT- WBAT left but avoid abduction maneuvers    Acute blood loss anemia  - S/P blood transfusion   - Continue trending H&H, transfuse if <7    Parkinson's disease s/p deep brain stimulator placement  - continue home dose of carbidopa/levodopa  -  reviewed and interpreted patient's lab and all other diagnostic data    Notes reviewed from all clinical/nonclinical/nursing services involved in patient's clinical care. Care coordination discussions were held with appropriate clinical/nonclinical/ nursing providers based on care coordination needs.     Labs:     Recent Labs     12/10/24  0509 12/11/24  0522   WBC 6.2 7.9   HGB 8.7* 9.3*   HCT 26.2* 28.3*    431*     Recent Labs     12/09/24  0533 12/10/24  0509 12/11/24  0522    138 137   K 3.6 3.9 3.5   * 107 107   CO2 28 28 26   BUN 18 16 14     Recent Labs     12/08/24  1423   ALT 11*   GLOB 3.3     Recent Labs     12/08/24  1423   INR 1.0      Recent Labs     12/08/24  1423   TIBC 207*      No results found for: \"RBCF\"   No results for input(s): \"PH\", \"PCO2\", \"PO2\" in the last 72 hours.  No results for input(s): \"CPK\" in the last 72 hours.    Invalid input(s): \"CPKMB\", \"CKNDX\", \"TROIQ\"  Lab Results   Component Value Date/Time    CHOL 125 12/09/2024 05:33 AM    HDL 50 12/09/2024 05:33 AM    LDL 57.6 12/09/2024 05:33 AM    LDL 79.6 03/06/2024 11:47 AM     No results found for: \"GLUCPOC\"  [unfilled]      Medications Reviewed:     Current Facility-Administered Medications   Medication Dose Route Frequency    0.9 % sodium chloride infusion   IntraVENous PRN    0.9 % sodium chloride infusion   IntraVENous PRN    sodium chloride flush 0.9 % injection 5-40 mL  5-40 mL IntraVENous 2 times per day    sodium chloride flush 0.9 % injection 5-40 mL  5-40 mL IntraVENous PRN    0.9 % sodium chloride infusion   IntraVENous PRN    ondansetron (ZOFRAN) injection 4 mg  4 mg IntraVENous Q6H PRN    sennosides-docusate sodium (SENOKOT-S) 8.6-50 MG tablet 1 tablet  1 tablet Oral BID    morphine (PF) injection 2 mg  2 mg IntraVENous Q4H PRN    naloxone (NARCAN) injection 0.4 mg  0.4 mg IntraVENous PRN    polyethylene glycol (GLYCOLAX) packet 17 g  17 g Oral Daily    acetaminophen (TYLENOL) tablet 1,000 mg  1,000 mg

## 2024-12-11 NOTE — PLAN OF CARE
Problem: Safety - Adult  Goal: Free from fall injury  12/11/2024 0841 by Remedios Brock, RN  Outcome: Progressing  12/10/2024 2356 by Rosenda Swann RN  Outcome: Progressing     Problem: Discharge Planning  Goal: Discharge to home or other facility with appropriate resources  12/11/2024 0841 by Remedios Brock, RN  Outcome: Progressing  12/10/2024 2356 by Rosenda Swann RN  Outcome: Progressing     Problem: Skin/Tissue Integrity  Goal: Absence of new skin breakdown  Description: 1.  Monitor for areas of redness and/or skin breakdown  2.  Assess vascular access sites hourly  3.  Every 4-6 hours minimum:  Change oxygen saturation probe site  4.  Every 4-6 hours:  If on nasal continuous positive airway pressure, respiratory therapy assess nares and determine need for appliance change or resting period.  Outcome: Progressing

## 2024-12-11 NOTE — PROGRESS NOTES
Occupational Therapy  12/11/2024    OT continues to follow patient.  Attempted to see patient for OT treatment, however per nursing and PT staff, pt with increased lethargy and worsening mental status. Undergoing further medical work-up at bedside. Not medically appropriate for OT intervention at this time.  Will defer and follow-up as patient is medically appropriate and available for OT.    Thank you,  Antoinette Rodriguez, OTR/L

## 2024-12-12 VITALS
TEMPERATURE: 97.6 F | HEIGHT: 70 IN | OXYGEN SATURATION: 98 % | WEIGHT: 177.69 LBS | DIASTOLIC BLOOD PRESSURE: 77 MMHG | HEART RATE: 77 BPM | RESPIRATION RATE: 28 BRPM | SYSTOLIC BLOOD PRESSURE: 157 MMHG | BODY MASS INDEX: 25.44 KG/M2

## 2024-12-12 PROCEDURE — 2580000003 HC RX 258: Performed by: INTERNAL MEDICINE

## 2024-12-12 PROCEDURE — 97530 THERAPEUTIC ACTIVITIES: CPT

## 2024-12-12 PROCEDURE — 6370000000 HC RX 637 (ALT 250 FOR IP): Performed by: INTERNAL MEDICINE

## 2024-12-12 PROCEDURE — 6370000000 HC RX 637 (ALT 250 FOR IP): Performed by: HOSPITALIST

## 2024-12-12 PROCEDURE — 97535 SELF CARE MNGMENT TRAINING: CPT

## 2024-12-12 RX ORDER — AMLODIPINE BESYLATE 5 MG/1
5 TABLET ORAL DAILY
Status: DISCONTINUED | OUTPATIENT
Start: 2024-12-12 | End: 2024-12-12 | Stop reason: HOSPADM

## 2024-12-12 RX ORDER — HYDRALAZINE HYDROCHLORIDE 20 MG/ML
15 INJECTION INTRAMUSCULAR; INTRAVENOUS EVERY 4 HOURS PRN
Status: DISCONTINUED | OUTPATIENT
Start: 2024-12-12 | End: 2024-12-12 | Stop reason: HOSPADM

## 2024-12-12 RX ORDER — AMLODIPINE BESYLATE 5 MG/1
5 TABLET ORAL DAILY
Qty: 30 TABLET | Refills: 0 | Status: SHIPPED
Start: 2024-12-13

## 2024-12-12 RX ADMIN — SODIUM CHLORIDE, PRESERVATIVE FREE 10 ML: 5 INJECTION INTRAVENOUS at 08:05

## 2024-12-12 RX ADMIN — CARBIDOPA AND LEVODOPA 1 TABLET: 25; 100 TABLET ORAL at 14:33

## 2024-12-12 RX ADMIN — CARBIDOPA, LEVODOPA AND ENTACAPONE 1 TABLET: 31.25; 200; 125 TABLET, FILM COATED ORAL at 12:27

## 2024-12-12 RX ADMIN — AMLODIPINE BESYLATE 5 MG: 5 TABLET ORAL at 10:16

## 2024-12-12 RX ADMIN — CARBIDOPA, LEVODOPA AND ENTACAPONE 1 TABLET: 31.25; 200; 125 TABLET, FILM COATED ORAL at 08:04

## 2024-12-12 RX ADMIN — POLYETHYLENE GLYCOL 3350 17 G: 17 POWDER, FOR SOLUTION ORAL at 08:05

## 2024-12-12 RX ADMIN — LEVOTHYROXINE SODIUM 25 MCG: 0.03 TABLET ORAL at 08:03

## 2024-12-12 RX ADMIN — CARBIDOPA AND LEVODOPA 1 TABLET: 50; 200 TABLET, EXTENDED RELEASE ORAL at 05:20

## 2024-12-12 RX ADMIN — SENNOSIDES AND DOCUSATE SODIUM 1 TABLET: 50; 8.6 TABLET ORAL at 08:04

## 2024-12-12 RX ADMIN — CARBIDOPA, LEVODOPA AND ENTACAPONE 1 TABLET: 31.25; 200; 125 TABLET, FILM COATED ORAL at 15:53

## 2024-12-12 RX ADMIN — DOCUSATE SODIUM 100 MG: 100 CAPSULE, LIQUID FILLED ORAL at 08:04

## 2024-12-12 RX ADMIN — CARBIDOPA, LEVODOPA AND ENTACAPONE 1 TABLET: 31.25; 200; 125 TABLET, FILM COATED ORAL at 10:16

## 2024-12-12 RX ADMIN — Medication 5000 UNITS: at 08:03

## 2024-12-12 RX ADMIN — CARBIDOPA AND LEVODOPA 1 TABLET: 25; 100 TABLET ORAL at 08:04

## 2024-12-12 RX ADMIN — LIOTHYRONINE SODIUM 10 MCG: 5 TABLET ORAL at 08:03

## 2024-12-12 RX ADMIN — LIOTHYRONINE SODIUM 5 MCG: 5 TABLET ORAL at 14:32

## 2024-12-12 RX ADMIN — CARBIDOPA AND LEVODOPA 1 TABLET: 50; 200 TABLET, EXTENDED RELEASE ORAL at 02:51

## 2024-12-12 RX ADMIN — CARBIDOPA, LEVODOPA AND ENTACAPONE 1 TABLET: 31.25; 200; 125 TABLET, FILM COATED ORAL at 14:32

## 2024-12-12 RX ADMIN — OXYCODONE HYDROCHLORIDE AND ACETAMINOPHEN 500 MG: 500 TABLET ORAL at 08:04

## 2024-12-12 NOTE — CARE COORDINATION
Transition of Care Plan:     RUR: 19%  Prior Level of Functioning: [facility]/ Memory Care (admitted 12/8/24)  Disposition: To [facility] SNF  MILES: 12/12/24  If SNF or IPR: Date FOC offered: 12/10/24  Date FOC received: 12/10/24  Accepting facility: 12/11/24 [facility] SNF  Date authorization started with reference number: N/A  Date authorization received and expires:   Follow up appointments:   DME needed: None  Transportation at discharge: BLS (severe Parkinsons)  IM/IMM Medicare/ letter given: 1st 12/8/24  Is patient a [place] and connected with [facility]?               If yes, was  transfer form completed and [facility] notified?   Caregiver Contact: spouse Rianna Vang [phone]  Discharge Caregiver contacted prior to discharge?   Care Conference needed?   Barriers to discharge:     Patient discussed during rounds, he needs to have PT/OT evaluations to ensure safety as he is orthostatic. Also was started on a new BP medication  today. The attending would like to see if new BP med has helped.     [facility] could admit patient today if medically stable per Trae ([phone]). Patient's wife would like to discuss which facility at [facility] patient will be going. CM notified Trea to call patient's wife as requested.      BAR Flores MSA, RN, CM    Transition of Care Plan to SNF/Rehab    Communication to Patient/Family:  Met with patient and family and they are agreeable to the transition plan. The Plan for Transition of Care is related to the following treatment goals:     The Patient and/or patient representative was provided with a choice of provider and agrees  with the discharge plan.      Yes [x] No []    A Freedom of choice list was provided with basic dialogue that supports the patient's individualized plan of care/goals and shares the quality data associated with the providers.       Yes [x] No []    SNF/Rehab Transition:  Patient has been accepted to [facility]

## 2024-12-12 NOTE — PROGRESS NOTES
BP readings during therapy session      12/12/24 1122 12/12/24 1129 12/12/24 1135   Vitals   BP (!) 151/84 (!) 156/89 (!) 127/39   MAP (Calculated) 106 111 68   BP Location Left upper arm Left upper arm Left upper arm   BP Method Automatic Automatic Automatic   Patient Position Supine Sitting Standing      12/12/24 1138 12/12/24 1156   Vitals   BP (!) 142/83 (!) 157/77   MAP (Calculated) 103 104   BP Location Left upper arm Left upper arm   BP Method Automatic Automatic   Patient Position Sitting Sitting  (AFTER STANDING TASK)

## 2024-12-12 NOTE — PROGRESS NOTES
1613: Pt discharged. IV removed. Site clean, dry and intact. Bandage placed.  Pt discharged teaching completed with wife and included: Medication changes, follow up appointments, s/s of stroke/heart attack, and my chart instructions. Wife has no questions. Patient medication given to the wife.     1614: Nurse attempted to call report. No answer.    1620: Nurse attempted to call report. No answer.     1629: Nurse attempted to call report. No answer. Case management made aware of situation. Case management  will attempt to call the facility and have them call me.      1645: Transport arrived. Patient left. Wife has patients belongings. .

## 2024-12-12 NOTE — DISCHARGE SUMMARY
visualized density in the right lower lobe, highly suspicious for pulmonary mass, versus masslike consolidation. Recommend dedicated CT of the chest with contrast for further evaluation. 3. 4.8 cm ascending thoracic aortic aneurysm. 4. Mild to moderate spinal canal narrowing at C3-4. Electronically signed by BRADY BARNARD    CT BRAIN PERFUSION    Result Date: 12/8/2024  *PRELIMINARY REPORT* No perfusion defect No LVO Right upper lobe mass? Preliminary report was provided by Dr. Manrique, the on-call radiologist, at 1404 Final report to follow. *END PRELIMINARY REPORT* CLINICAL HISTORY: Left-sided weakness EXAMINATION:  CT ANGIOGRAPHY HEAD AND NECK, CT PERFUSION COMPARISON: CT head October 31, 2024 TECHNIQUE:  Following the uneventful administration of iodinated contrast material, axial CT angiography of the head and neck was performed. Delayed axial images through the head were also obtained. Coronal and sagittal reconstructions were obtained. Manual postprocessing of images was performed. 3-D  Sagittal maximal intensity projection images were obtained.  3-D Coronal maximal intensity projections were obtained. CT brain perfusion was performed with generation of hemodynamic maps of multiple parameters, including cerebral blood flow, cerebral blood volume, mean transit time, and TMAX. CT dose reduction was achieved through use of a standardized protocol tailored for this examination and automatic exposure control for dose modulation. This study was analyzed by the [employer] algorithm. FINDINGS: DELAYED ENHANCEMENT HEAD CT Bilateral basal ganglia stimulators in place. No intra or extra-axial mass or collection. Ventricles are normal in size and configuration. The basal cisterns are patent. Dural venous sinuses are patent. No abnormal parenchymal or meningeal enhancement. Mastoid air cells and paranasal sinuses are clear. CTA NECK: Great vessels: 4.8 cm ascending thoracic aortic aneurysm.. Right subclavian artery: Patent Left  subclavian artery: Patent Right common carotid artery: Patent Left common carotid artery: Patent Cervical right internal carotid artery: Mild disease with no significant stenosis by NASCET criteria. Cervical left internal carotid artery: Mild disease with no significant stenosis by NASCET criteria. Right vertebral artery: Patent Left vertebral artery: Patent Partially visualized density in the right lower lobe, highly suspicious for mass, versus masslike consolidation.. The thyroid is homogeneous. Anterolisthesis of C3 on C4 causing mild to moderate spinal canal narrowing. Measurements utilize NASCET criteria. CTA HEAD: Right cavernous internal carotid artery: Patent Left cavernous internal carotid artery: Patent Anterior cerebral arteries: Patent Anterior communicating artery: Patent Right middle cerebral artery: Patent Left middle cerebral artery: Patent Posterior communicating arteries: Widely patent on the left. Diminutive on the right. Posterior cerebral arteries: Fetal origin on the left. Normal anatomy on the right. Basilar artery: Patent Distal vertebral arteries: Patent No evidence for intracranial aneurysm or hemodynamically significant stenosis. CT Perfusion: Normal CT perfusion.     1. No acute vascular abnormality, no large vessel occlusion. No hemodynamically significant stenosis. Normal perfusion. 2. Partially visualized density in the right lower lobe, highly suspicious for pulmonary mass, versus masslike consolidation. Recommend dedicated CT of the chest with contrast for further evaluation. 3. 4.8 cm ascending thoracic aortic aneurysm. 4. Mild to moderate spinal canal narrowing at C3-4. Electronically signed by BRADY BARNARD    XR CHEST PORTABLE    Result Date: 12/8/2024  EXAM: XR CHEST PORTABLE INDICATION: falls COMPARISON: Falls FINDINGS: A portable AP radiograph of the chest was obtained at 1557 hours. . Current monitoring leads. Decreased lung volumes.. Cardiomegaly. Mild pulmonary edema.  The

## 2024-12-12 NOTE — PLAN OF CARE
Problem: Occupational Therapy - Adult  Goal: By Discharge: Performs self-care activities at highest level of function for planned discharge setting.  See evaluation for individualized goals.  Description: FUNCTIONAL STATUS PRIOR TO ADMISSION:  The patient presents from [facility]. Pt is a questionable historian but reports ambulating with a RW and performing ADLs with little assistance.      Occupational Therapy Goals:  Initiated 12/9/2024  1.  Patient will perform grooming with Set-up and Supervision sitting EOB within 7 day(s).  2.  Patient will perform upper body dressing with Set-up within 7 day(s).  3.  Patient will perform toilet transfers with Moderate Assist  within 7 day(s).  4.  Patient will perform all aspects of toileting with Moderate Assist within 7 day(s).  5.  Patient will participate in upper extremity therapeutic exercise/activities with Supervision for 5 minutes within 7 day(s).    6.  Patient will utilize energy conservation techniques during functional activities with verbal cues within 7 day(s).   Outcome: Progressing   OCCUPATIONAL THERAPY TREATMENT  Patient: Fan Vang (73 y.o. male)  Date: 12/12/2024  Primary Diagnosis: Generalized weakness [R53.1]  Recurrent falls [R29.6]  Closed left hip fracture, initial encounter ([facility]) [S72.002A]  Thigh hematoma, left, initial encounter [S70.12XA]  Symptomatic anemia [D64.9]  Avulsion fracture of left hip, closed, initial encounter ([facility]) [S72.002A]       Precautions: Fall Risk, Weight Bearing (avoid abduction movements LLE per ortho surgery)   Left Lower Extremity Weight Bearing: Weight Bearing As Tolerated            Chart, occupational therapy assessment, plan of care, and goals were reviewed.    ASSESSMENT  Patient continues to benefit from skilled OT services and is progressing towards goals. Patient requires mod assistance for transfers, up to total assist for toileting and LB dressing,. He is impacted by impaired cognition, impaired

## 2024-12-12 NOTE — PLAN OF CARE
Problem: Physical Therapy - Adult  Goal: By Discharge: Performs mobility at highest level of function for planned discharge setting.  See evaluation for individualized goals.  Description: FUNCTIONAL STATUS PRIOR TO ADMISSION: Pt was an inconsistent historian this date; unclear level of A req'd for ADLs or mobility level; pt reports utilizing RW for mobility, but with waxing and waning confusion throughout; no family present for clarification    HOME SUPPORT PRIOR TO ADMISSION: Pt resided at [facility]; unclear level of assistance available for mobility/ADL activities    Physical Therapy Goals  Initiated 12/9/2024  1.  Patient will move from supine to sit and sit to supine in bed with supervision/set-up within 7 day(s).    2.  Patient will perform sit to stand with minimal assistance within 7 day(s).  3.  Patient will transfer from bed to chair and chair to bed with minimal assistance using the least restrictive device within 7 day(s).  4.  Patient will ambulate with minimal assistance for 25 feet with the least restrictive device within 7 day(s).   Outcome: Progressing   PHYSICAL THERAPY TREATMENT    Patient: Fan Vang (73 y.o. male)  Date: 12/12/2024  Diagnosis: Generalized weakness [R53.1]  Recurrent falls [R29.6]  Closed left hip fracture, initial encounter ([facility]) [S72.002A]  Thigh hematoma, left, initial encounter [S70.12XA]  Symptomatic anemia [D64.9]  Avulsion fracture of left hip, closed, initial encounter ([facility]) [S72.002A] Avulsion fracture of left hip, closed, initial encounter ([facility])      Precautions: Fall Risk, Weight Bearing (avoid abduction movements LLE per ortho surgery)   Left Lower Extremity Weight Bearing: Weight Bearing As Tolerated                  ASSESSMENT:  Patient continues to benefit from skilled PT services and is slowly progressing towards goals. Patient seen for ongoing PT intervention with improved activity tolerance and fair tolerance to PT intervention.  Focus of

## 2024-12-13 ENCOUNTER — CARE COORDINATION (OUTPATIENT)
Dept: CARE COORDINATION | Age: 74
End: 2024-12-13

## 2024-12-13 NOTE — CARE COORDINATION
SECURE email notification sent to identified IDT members at   [facility]

## 2025-01-15 ENCOUNTER — HOSPITAL ENCOUNTER (EMERGENCY)
Facility: HOSPITAL | Age: 75
Discharge: HOME | End: 2025-01-15
Attending: EMERGENCY MEDICINE
Payer: MEDICARE

## 2025-01-15 ENCOUNTER — APPOINTMENT (OUTPATIENT)
Facility: HOSPITAL | Age: 75
End: 2025-01-15
Payer: MEDICARE

## 2025-01-15 VITALS
HEIGHT: 70 IN | RESPIRATION RATE: 20 BRPM | HEART RATE: 66 BPM | TEMPERATURE: 97.6 F | DIASTOLIC BLOOD PRESSURE: 70 MMHG | SYSTOLIC BLOOD PRESSURE: 110 MMHG | OXYGEN SATURATION: 97 % | BODY MASS INDEX: 25.4 KG/M2

## 2025-01-15 DIAGNOSIS — S09.90XA INJURY OF HEAD, INITIAL ENCOUNTER: Primary | ICD-10-CM

## 2025-01-15 DIAGNOSIS — N30.00 ACUTE CYSTITIS WITHOUT HEMATURIA: ICD-10-CM

## 2025-01-15 LAB
APPEARANCE UR: ABNORMAL
BACTERIA URNS QL MICRO: ABNORMAL /HPF
BILIRUB UR QL CFM: NEGATIVE
COLOR UR: ABNORMAL
EPITH CASTS URNS QL MICRO: ABNORMAL /LPF
GLUCOSE UR STRIP.AUTO-MCNC: NEGATIVE MG/DL
HGB UR QL STRIP: ABNORMAL
KETONES UR QL STRIP.AUTO: ABNORMAL MG/DL
LEUKOCYTE ESTERASE UR QL STRIP.AUTO: ABNORMAL
NITRITE UR QL STRIP.AUTO: POSITIVE
PH UR STRIP: 5.5 (ref 5–8)
PROT UR STRIP-MCNC: 100 MG/DL
RBC #/AREA URNS HPF: ABNORMAL /HPF (ref 0–5)
SP GR UR REFRACTOMETRY: 1.02 (ref 1–1.03)
URINE CULTURE IF INDICATED: ABNORMAL
UROBILINOGEN UR QL STRIP.AUTO: 1 EU/DL (ref 0.2–1)
WBC URNS QL MICRO: >100 /HPF (ref 0–4)

## 2025-01-15 PROCEDURE — 99284 EMERGENCY DEPT VISIT MOD MDM: CPT

## 2025-01-15 PROCEDURE — 81001 URINALYSIS AUTO W/SCOPE: CPT

## 2025-01-15 PROCEDURE — 70450 CT HEAD/BRAIN W/O DYE: CPT

## 2025-01-15 PROCEDURE — 87186 SC STD MICRODIL/AGAR DIL: CPT

## 2025-01-15 PROCEDURE — 87086 URINE CULTURE/COLONY COUNT: CPT

## 2025-01-15 PROCEDURE — 6370000000 HC RX 637 (ALT 250 FOR IP): Performed by: EMERGENCY MEDICINE

## 2025-01-15 PROCEDURE — 87088 URINE BACTERIA CULTURE: CPT

## 2025-01-15 RX ORDER — CEPHALEXIN 500 MG/1
500 CAPSULE ORAL 2 TIMES DAILY
Qty: 14 CAPSULE | Refills: 0 | Status: SHIPPED | OUTPATIENT
Start: 2025-01-15 | End: 2025-01-22

## 2025-01-15 RX ORDER — ENTACAPONE 200 MG/1
200 TABLET ORAL
Status: COMPLETED | OUTPATIENT
Start: 2025-01-15 | End: 2025-01-15

## 2025-01-15 RX ORDER — CEPHALEXIN 500 MG/1
500 CAPSULE ORAL
Status: COMPLETED | OUTPATIENT
Start: 2025-01-15 | End: 2025-01-15

## 2025-01-15 RX ORDER — CARBIDOPA AND LEVODOPA 25; 100 MG/1; MG/1
1.5 TABLET ORAL
Status: COMPLETED | OUTPATIENT
Start: 2025-01-15 | End: 2025-01-15

## 2025-01-15 RX ADMIN — CARBIDOPA AND LEVODOPA 1.5 TABLET: 25; 100 TABLET ORAL at 14:03

## 2025-01-15 RX ADMIN — CEPHALEXIN 500 MG: 500 CAPSULE ORAL at 14:19

## 2025-01-15 RX ADMIN — ENTACAPONE 200 MG: 200 TABLET, FILM COATED ORAL at 14:03

## 2025-01-15 ASSESSMENT — ENCOUNTER SYMPTOMS
GASTROINTESTINAL NEGATIVE: 1
EYES NEGATIVE: 1
RESPIRATORY NEGATIVE: 1

## 2025-01-15 ASSESSMENT — PAIN - FUNCTIONAL ASSESSMENT: PAIN_FUNCTIONAL_ASSESSMENT: NONE - DENIES PAIN

## 2025-01-15 NOTE — ED PROVIDER NOTES
[facility] EMERGENCY DEPARTMENT  EMERGENCY DEPARTMENT ENCOUNTER      Pt Name: Fan Vang  MRN: [id number]  Birthdate 1950  Date of evaluation: 1/15/2025  Provider: Todd Tracy MD    CHIEF COMPLAINT       Chief Complaint   Patient presents with    Fall         HISTORY OF PRESENT ILLNESS   (Location/Symptom, Timing/Onset, Context/Setting, Quality, Duration, Modifying Factors, Severity)  Note limiting factors.   74-year-old man with PMHx of dementia, Parkinson disease status post deep brain stimulator implantation, thyroid cancer status post radioactive iodine and partial thyroidectomy, and right retinal detachment several years ago presents to the emergency department from his memory care unit for evaluation following a head injury this morning.  Patient reports that he was sitting in his wheelchair and leaned too far forward and very slowly fell forward striking the left side of his forehead against the hard ground.  Patient remembers the events and denies LOC.  He reports having a headache for approximately 20 minutes after the injury but denies any current symptoms.  He has no additional complaints at this time.    The history is provided by the patient and the spouse.         Review of External Medical Records:     Nursing Notes were reviewed.    REVIEW OF SYSTEMS    (2-9 systems for level 4, 10 or more for level 5)     Review of Systems   Constitutional: Negative.    HENT: Negative.     Eyes: Negative.    Respiratory: Negative.     Cardiovascular: Negative.    Gastrointestinal: Negative.    Genitourinary: Negative.    Musculoskeletal: Negative.    Skin: Negative.    Neurological:  Positive for headaches.   Psychiatric/Behavioral: Negative.         Except as noted above the remainder of the review of systems was reviewed and negative.       PAST MEDICAL HISTORY     Past Medical History:   Diagnosis Date    Arthritis     back and left knee    Dependence on walking stick     bilateral/single hiking

## 2025-01-15 NOTE — ED TRIAGE NOTES
Patient presents to ED via EMS with c/o leaning over too far and falling out of wheelchair. Hematoma to R forehead, no bleeding. Facility states pt is more confused than normal. Pt is in the memory care unit.     Patient alert and disoriented, skin w/p/d, in NAD, resprs easy unlabored.

## 2025-01-15 NOTE — DISCHARGE INSTRUCTIONS
You were seen in the emergency department for evaluation after a head injury.  The results of your tests were reassuring. Please follow-up with your PCP or return to the emergency department if you experience a worsening of symptoms or any new symptoms that are concerning to you.

## 2025-01-15 NOTE — ED NOTES
RICKY called ETA 1600    H2H called ETA 1515    Wife, Nupur called for update    David called for handoff report, no answer. Will try again.

## 2025-01-17 LAB
BACTERIA SPEC CULT: ABNORMAL
CC UR VC: ABNORMAL
SERVICE CMNT-IMP: ABNORMAL

## 2025-05-05 ENCOUNTER — APPOINTMENT (OUTPATIENT)
Facility: HOSPITAL | Age: 75
DRG: 871 | End: 2025-05-05
Payer: MEDICARE

## 2025-05-05 ENCOUNTER — HOSPITAL ENCOUNTER (INPATIENT)
Facility: HOSPITAL | Age: 75
LOS: 9 days | Discharge: NURSING, REHAB OR LONG-TERM CARE | DRG: 871 | End: 2025-05-14
Attending: STUDENT IN AN ORGANIZED HEALTH CARE EDUCATION/TRAINING PROGRAM | Admitting: INTERNAL MEDICINE
Payer: MEDICARE

## 2025-05-05 DIAGNOSIS — I95.9 HYPOTENSION, UNSPECIFIED HYPOTENSION TYPE: ICD-10-CM

## 2025-05-05 DIAGNOSIS — R31.0 GROSS HEMATURIA: ICD-10-CM

## 2025-05-05 DIAGNOSIS — R09.02 HYPOXEMIA: ICD-10-CM

## 2025-05-05 DIAGNOSIS — R41.82 ALTERED MENTAL STATUS, UNSPECIFIED ALTERED MENTAL STATUS TYPE: Primary | ICD-10-CM

## 2025-05-05 DIAGNOSIS — R65.10 SIRS (SYSTEMIC INFLAMMATORY RESPONSE SYNDROME) (HCC): ICD-10-CM

## 2025-05-05 PROBLEM — A41.9 SEPSIS (HCC): Status: ACTIVE | Noted: 2025-05-05

## 2025-05-05 LAB
ALBUMIN SERPL-MCNC: 3.3 G/DL (ref 3.5–5)
ALBUMIN/GLOB SERPL: 0.9 (ref 1.1–2.2)
ALP SERPL-CCNC: 58 U/L (ref 45–117)
ALT SERPL-CCNC: 8 U/L (ref 12–78)
AMMONIA PLAS-SCNC: 25 UMOL/L
ANION GAP SERPL CALC-SCNC: 4 MMOL/L (ref 2–12)
AST SERPL-CCNC: 28 U/L (ref 15–37)
BASOPHILS # BLD: 0 K/UL (ref 0–0.1)
BASOPHILS NFR BLD: 0 % (ref 0–1)
BILIRUB SERPL-MCNC: 1.8 MG/DL (ref 0.2–1)
BUN SERPL-MCNC: 22 MG/DL (ref 6–20)
BUN/CREAT SERPL: 27 (ref 12–20)
CALCIUM SERPL-MCNC: 8.9 MG/DL (ref 8.5–10.1)
CHLORIDE SERPL-SCNC: 98 MMOL/L (ref 97–108)
CO2 SERPL-SCNC: 30 MMOL/L (ref 21–32)
COMMENT:: NORMAL
CREAT SERPL-MCNC: 0.82 MG/DL (ref 0.7–1.3)
DIFFERENTIAL METHOD BLD: ABNORMAL
EKG ATRIAL RATE: 77 BPM
EKG DIAGNOSIS: NORMAL
EKG P AXIS: 9 DEGREES
EKG P-R INTERVAL: 178 MS
EKG Q-T INTERVAL: 392 MS
EKG QRS DURATION: 90 MS
EKG QTC CALCULATION (BAZETT): 443 MS
EKG R AXIS: 22 DEGREES
EKG T AXIS: 97 DEGREES
EKG VENTRICULAR RATE: 77 BPM
EOSINOPHIL # BLD: 0 K/UL (ref 0–0.4)
EOSINOPHIL NFR BLD: 0 % (ref 0–7)
ERYTHROCYTE [DISTWIDTH] IN BLOOD BY AUTOMATED COUNT: 13.9 % (ref 11.5–14.5)
ETHANOL SERPL-MCNC: <10 MG/DL (ref 0–0.08)
FLUAV RNA SPEC QL NAA+PROBE: NOT DETECTED
FLUBV RNA SPEC QL NAA+PROBE: NOT DETECTED
GLOBULIN SER CALC-MCNC: 3.7 G/DL (ref 2–4)
GLUCOSE SERPL-MCNC: 90 MG/DL (ref 65–100)
HCT VFR BLD AUTO: 28 % (ref 36.6–50.3)
HGB BLD-MCNC: 9.6 G/DL (ref 12.1–17)
IMM GRANULOCYTES # BLD AUTO: 0 K/UL
IMM GRANULOCYTES NFR BLD AUTO: 0 %
LACTATE SERPL-SCNC: 1 MMOL/L (ref 0.4–2)
LIPASE SERPL-CCNC: 13 U/L (ref 13–75)
LYMPHOCYTES # BLD: 0.51 K/UL (ref 0.8–3.5)
LYMPHOCYTES NFR BLD: 3 % (ref 12–49)
MAGNESIUM SERPL-MCNC: 1.9 MG/DL (ref 1.6–2.4)
MCH RBC QN AUTO: 31.2 PG (ref 26–34)
MCHC RBC AUTO-ENTMCNC: 34.3 G/DL (ref 30–36.5)
MCV RBC AUTO: 90.9 FL (ref 80–99)
MONOCYTES # BLD: 1.02 K/UL (ref 0–1)
MONOCYTES NFR BLD: 6 % (ref 5–13)
NEUTS BAND NFR BLD MANUAL: 8 % (ref 0–6)
NEUTS SEG # BLD: 15.47 K/UL (ref 1.8–8)
NEUTS SEG NFR BLD: 83 % (ref 32–75)
NRBC # BLD: 0 K/UL (ref 0–0.01)
NRBC BLD-RTO: 0 PER 100 WBC
PLATELET # BLD AUTO: 337 K/UL (ref 150–400)
PMV BLD AUTO: 9 FL (ref 8.9–12.9)
POTASSIUM SERPL-SCNC: 3.3 MMOL/L (ref 3.5–5.1)
PROT SERPL-MCNC: 7 G/DL (ref 6.4–8.2)
RBC # BLD AUTO: 3.08 M/UL (ref 4.1–5.7)
RBC MORPH BLD: ABNORMAL
SARS-COV-2 RNA RESP QL NAA+PROBE: NOT DETECTED
SODIUM SERPL-SCNC: 132 MMOL/L (ref 136–145)
SOURCE: NORMAL
SPECIMEN HOLD: NORMAL
TROPONIN I SERPL HS-MCNC: 10 NG/L (ref 0–76)
WBC # BLD AUTO: 17 K/UL (ref 4.1–11.1)

## 2025-05-05 PROCEDURE — 93005 ELECTROCARDIOGRAM TRACING: CPT | Performed by: STUDENT IN AN ORGANIZED HEALTH CARE EDUCATION/TRAINING PROGRAM

## 2025-05-05 PROCEDURE — 84484 ASSAY OF TROPONIN QUANT: CPT

## 2025-05-05 PROCEDURE — 2500000003 HC RX 250 WO HCPCS: Performed by: STUDENT IN AN ORGANIZED HEALTH CARE EDUCATION/TRAINING PROGRAM

## 2025-05-05 PROCEDURE — 87186 SC STD MICRODIL/AGAR DIL: CPT

## 2025-05-05 PROCEDURE — 2580000003 HC RX 258: Performed by: STUDENT IN AN ORGANIZED HEALTH CARE EDUCATION/TRAINING PROGRAM

## 2025-05-05 PROCEDURE — 87077 CULTURE AEROBIC IDENTIFY: CPT

## 2025-05-05 PROCEDURE — 96361 HYDRATE IV INFUSION ADD-ON: CPT

## 2025-05-05 PROCEDURE — 82140 ASSAY OF AMMONIA: CPT

## 2025-05-05 PROCEDURE — 82077 ASSAY SPEC XCP UR&BREATH IA: CPT

## 2025-05-05 PROCEDURE — 70450 CT HEAD/BRAIN W/O DYE: CPT

## 2025-05-05 PROCEDURE — 2500000003 HC RX 250 WO HCPCS: Performed by: INTERNAL MEDICINE

## 2025-05-05 PROCEDURE — 6360000004 HC RX CONTRAST MEDICATION: Performed by: RADIOLOGY

## 2025-05-05 PROCEDURE — 87040 BLOOD CULTURE FOR BACTERIA: CPT

## 2025-05-05 PROCEDURE — 71045 X-RAY EXAM CHEST 1 VIEW: CPT

## 2025-05-05 PROCEDURE — 2060000000 HC ICU INTERMEDIATE R&B

## 2025-05-05 PROCEDURE — 6370000000 HC RX 637 (ALT 250 FOR IP): Performed by: STUDENT IN AN ORGANIZED HEALTH CARE EDUCATION/TRAINING PROGRAM

## 2025-05-05 PROCEDURE — 6360000002 HC RX W HCPCS: Performed by: STUDENT IN AN ORGANIZED HEALTH CARE EDUCATION/TRAINING PROGRAM

## 2025-05-05 PROCEDURE — 83605 ASSAY OF LACTIC ACID: CPT

## 2025-05-05 PROCEDURE — 83735 ASSAY OF MAGNESIUM: CPT

## 2025-05-05 PROCEDURE — 85025 COMPLETE CBC W/AUTO DIFF WBC: CPT

## 2025-05-05 PROCEDURE — 6360000002 HC RX W HCPCS: Performed by: INTERNAL MEDICINE

## 2025-05-05 PROCEDURE — 87150 DNA/RNA AMPLIFIED PROBE: CPT

## 2025-05-05 PROCEDURE — 87636 SARSCOV2 & INF A&B AMP PRB: CPT

## 2025-05-05 PROCEDURE — 80053 COMPREHEN METABOLIC PANEL: CPT

## 2025-05-05 PROCEDURE — 51700 IRRIGATION OF BLADDER: CPT

## 2025-05-05 PROCEDURE — 6360000004 HC RX CONTRAST MEDICATION: Performed by: STUDENT IN AN ORGANIZED HEALTH CARE EDUCATION/TRAINING PROGRAM

## 2025-05-05 PROCEDURE — 83690 ASSAY OF LIPASE: CPT

## 2025-05-05 PROCEDURE — 99285 EMERGENCY DEPT VISIT HI MDM: CPT

## 2025-05-05 PROCEDURE — 96374 THER/PROPH/DIAG INJ IV PUSH: CPT

## 2025-05-05 PROCEDURE — 71275 CT ANGIOGRAPHY CHEST: CPT

## 2025-05-05 RX ORDER — IOPAMIDOL 755 MG/ML
100 INJECTION, SOLUTION INTRAVASCULAR
Status: COMPLETED | OUTPATIENT
Start: 2025-05-05 | End: 2025-05-05

## 2025-05-05 RX ORDER — LIDOCAINE HYDROCHLORIDE 20 MG/ML
JELLY TOPICAL PRN
Status: DISCONTINUED | OUTPATIENT
Start: 2025-05-05 | End: 2025-05-14 | Stop reason: HOSPADM

## 2025-05-05 RX ORDER — ACETAMINOPHEN 500 MG
1000 TABLET ORAL ONCE
Status: DISCONTINUED | OUTPATIENT
Start: 2025-05-05 | End: 2025-05-05

## 2025-05-05 RX ORDER — SODIUM CHLORIDE, SODIUM LACTATE, POTASSIUM CHLORIDE, AND CALCIUM CHLORIDE .6; .31; .03; .02 G/100ML; G/100ML; G/100ML; G/100ML
30 INJECTION, SOLUTION INTRAVENOUS
Status: COMPLETED | OUTPATIENT
Start: 2025-05-05 | End: 2025-05-05

## 2025-05-05 RX ORDER — ACETAMINOPHEN 650 MG/1
650 SUPPOSITORY RECTAL
Status: COMPLETED | OUTPATIENT
Start: 2025-05-05 | End: 2025-05-05

## 2025-05-05 RX ADMIN — SODIUM CHLORIDE, SODIUM LACTATE, POTASSIUM CHLORIDE, AND CALCIUM CHLORIDE 2268 ML: .6; .31; .03; .02 INJECTION, SOLUTION INTRAVENOUS at 19:06

## 2025-05-05 RX ADMIN — ACETAMINOPHEN 650 MG: 650 SUPPOSITORY RECTAL at 18:17

## 2025-05-05 RX ADMIN — IOPAMIDOL 80 ML: 755 INJECTION, SOLUTION INTRAVENOUS at 19:59

## 2025-05-05 RX ADMIN — WATER 1000 MG: 1 INJECTION INTRAMUSCULAR; INTRAVENOUS; SUBCUTANEOUS at 18:17

## 2025-05-05 RX ADMIN — WATER 2.5 MG: 1 INJECTION INTRAMUSCULAR; INTRAVENOUS; SUBCUTANEOUS at 23:36

## 2025-05-05 ASSESSMENT — PAIN - FUNCTIONAL ASSESSMENT: PAIN_FUNCTIONAL_ASSESSMENT: 0-10

## 2025-05-05 ASSESSMENT — PAIN SCALES - GENERAL: PAINLEVEL_OUTOF10: 0

## 2025-05-05 NOTE — ED NOTES
Code sepsis called at this time. Dr. Tomlinson notified of patients consistent low blood pressure and hypoxic state.     MD @ bedside. Orders for fluid resuscitation given.

## 2025-05-05 NOTE — ED TRIAGE NOTES
Patient arrives to the ED by EMS from [facility] for complaints of altered mental status and lethargy. Patient lives at [facility] in the memory care unit. Staff at the facility report patient is more altered than normal and became lethargic yesterday evening.     . Patient AOX2 on arrival to ED.

## 2025-05-06 ENCOUNTER — APPOINTMENT (OUTPATIENT)
Facility: HOSPITAL | Age: 75
DRG: 871 | End: 2025-05-06
Payer: MEDICARE

## 2025-05-06 LAB
ACB COMPLEX DNA BLD POS QL NAA+NON-PROBE: NOT DETECTED
ACCESSION NUMBER, LLC1M: ABNORMAL
ALBUMIN SERPL-MCNC: <0.6 G/DL (ref 3.5–5)
ALBUMIN/GLOB SERPL: ABNORMAL (ref 1.1–2.2)
ALP SERPL-CCNC: 70 U/L (ref 45–117)
ALT SERPL-CCNC: 17 U/L (ref 12–78)
ANION GAP SERPL CALC-SCNC: 6 MMOL/L (ref 2–12)
APAP SERPL-MCNC: <2 UG/ML (ref 10–30)
AST SERPL-CCNC: 29 U/L (ref 15–37)
B FRAGILIS DNA BLD POS QL NAA+NON-PROBE: NOT DETECTED
BASOPHILS # BLD: 0 K/UL (ref 0–0.1)
BASOPHILS NFR BLD: 0 % (ref 0–1)
BILIRUB SERPL-MCNC: 1.2 MG/DL (ref 0.2–1)
BIOFIRE TEST COMMENT: ABNORMAL
BLACTX-M ISLT/SPM QL: NOT DETECTED
BLAIMP ISLT/SPM QL: NOT DETECTED
BLAKPC ISLT/SPM QL: NOT DETECTED
BLAOXA-48-LIKE ISLT/SPM QL: NOT DETECTED
BLAVIM ISLT/SPM QL: NOT DETECTED
BUN SERPL-MCNC: <1 MG/DL (ref 6–20)
BUN/CREAT SERPL: ABNORMAL (ref 12–20)
C ALBICANS DNA BLD POS QL NAA+NON-PROBE: NOT DETECTED
C AURIS DNA BLD POS QL NAA+NON-PROBE: NOT DETECTED
C GATTII+NEOFOR DNA BLD POS QL NAA+N-PRB: NOT DETECTED
C GLABRATA DNA BLD POS QL NAA+NON-PROBE: NOT DETECTED
C KRUSEI DNA BLD POS QL NAA+NON-PROBE: NOT DETECTED
C PARAP DNA BLD POS QL NAA+NON-PROBE: NOT DETECTED
C TROPICLS DNA BLD POS QL NAA+NON-PROBE: NOT DETECTED
CALCIUM SERPL-MCNC: 8.4 MG/DL (ref 8.5–10.1)
CHLORIDE SERPL-SCNC: 102 MMOL/L (ref 97–108)
CO2 SERPL-SCNC: 27 MMOL/L (ref 21–32)
COLISTIN RES MCR-1 ISLT/SPM QL: NOT DETECTED
COMMENT:: NORMAL
CREAT SERPL-MCNC: 1 MG/DL (ref 0.7–1.3)
DIFFERENTIAL METHOD BLD: ABNORMAL
E CLOAC COMP DNA BLD POS NAA+NON-PROBE: NOT DETECTED
E COLI DNA BLD POS QL NAA+NON-PROBE: DETECTED
E FAECALIS DNA BLD POS QL NAA+NON-PROBE: NOT DETECTED
E FAECIUM DNA BLD POS QL NAA+NON-PROBE: NOT DETECTED
ENTEROBACTERALES DNA BLD POS NAA+N-PRB: DETECTED
EOSINOPHIL # BLD: 0 K/UL (ref 0–0.4)
EOSINOPHIL NFR BLD: 0 % (ref 0–7)
ERYTHROCYTE [DISTWIDTH] IN BLOOD BY AUTOMATED COUNT: 14.6 % (ref 11.5–14.5)
FERRITIN SERPL-MCNC: 291 NG/ML (ref 26–388)
FOLATE SERPL-MCNC: 13.9 NG/ML (ref 5–21)
GLOBULIN SER CALC-MCNC: ABNORMAL G/DL (ref 2–4)
GLUCOSE SERPL-MCNC: 83 MG/DL (ref 65–100)
GP B STREP DNA BLD POS QL NAA+NON-PROBE: NOT DETECTED
HAEM INFLU DNA BLD POS QL NAA+NON-PROBE: NOT DETECTED
HCT VFR BLD AUTO: 24.9 % (ref 36.6–50.3)
HGB BLD-MCNC: 8.5 G/DL (ref 12.1–17)
IMM GRANULOCYTES # BLD AUTO: 0 K/UL
IMM GRANULOCYTES NFR BLD AUTO: 0 %
IRON SATN MFR SERPL: ABNORMAL % (ref 20–50)
IRON SERPL-MCNC: 8 UG/DL (ref 35–150)
K OXYTOCA DNA BLD POS QL NAA+NON-PROBE: NOT DETECTED
KLEBSIELLA SP DNA BLD POS QL NAA+NON-PRB: NOT DETECTED
KLEBSIELLA SP DNA BLD POS QL NAA+NON-PRB: NOT DETECTED
L MONOCYTOG DNA BLD POS QL NAA+NON-PROBE: NOT DETECTED
LYMPHOCYTES # BLD: 0 K/UL (ref 0.8–3.5)
LYMPHOCYTES NFR BLD: 0 % (ref 12–49)
MAGNESIUM SERPL-MCNC: 1.7 MG/DL (ref 1.6–2.4)
MCH RBC QN AUTO: 31.7 PG (ref 26–34)
MCHC RBC AUTO-ENTMCNC: 34.1 G/DL (ref 30–36.5)
MCV RBC AUTO: 92.9 FL (ref 80–99)
METAMYELOCYTES NFR BLD MANUAL: 5 %
MONOCYTES # BLD: 0.53 K/UL (ref 0–1)
MONOCYTES NFR BLD: 2 % (ref 5–13)
N MEN DNA BLD POS QL NAA+NON-PROBE: NOT DETECTED
NEUTS BAND NFR BLD MANUAL: 2 % (ref 0–6)
NEUTS SEG # BLD: 24.65 K/UL (ref 1.8–8)
NEUTS SEG NFR BLD: 91 % (ref 32–75)
NRBC # BLD: 0 K/UL (ref 0–0.01)
NRBC BLD-RTO: 0 PER 100 WBC
P AERUGINOSA DNA BLD POS NAA+NON-PROBE: NOT DETECTED
PHOSPHATE SERPL-MCNC: 1.8 MG/DL (ref 2.6–4.7)
PLATELET # BLD AUTO: 265 K/UL (ref 150–400)
PMV BLD AUTO: 9.4 FL (ref 8.9–12.9)
POTASSIUM SERPL-SCNC: 3.5 MMOL/L (ref 3.5–5.1)
PROCALCITONIN SERPL-MCNC: 37.14 NG/ML
PROT SERPL-MCNC: 6.4 G/DL (ref 6.4–8.2)
PROTEUS SP DNA BLD POS QL NAA+NON-PROBE: NOT DETECTED
RBC # BLD AUTO: 2.68 M/UL (ref 4.1–5.7)
RBC MORPH BLD: ABNORMAL
RESISTANT GENE NDM BY PCR: NOT DETECTED
RESISTANT GENE TARGETS: ABNORMAL
S AUREUS DNA BLD POS QL NAA+NON-PROBE: NOT DETECTED
S AUREUS+CONS DNA BLD POS NAA+NON-PROBE: NOT DETECTED
S EPIDERMIDIS DNA BLD POS QL NAA+NON-PRB: NOT DETECTED
S LUGDUNENSIS DNA BLD POS QL NAA+NON-PRB: NOT DETECTED
S MALTOPHILIA DNA BLD POS QL NAA+NON-PRB: NOT DETECTED
S MARCESCENS DNA BLD POS NAA+NON-PROBE: NOT DETECTED
S PNEUM DNA BLD POS QL NAA+NON-PROBE: NOT DETECTED
S PYO DNA BLD POS QL NAA+NON-PROBE: NOT DETECTED
SALMONELLA DNA BLD POS QL NAA+NON-PROBE: NOT DETECTED
SODIUM SERPL-SCNC: 135 MMOL/L (ref 136–145)
SPECIMEN HOLD: NORMAL
SPECIMEN HOLD: NORMAL
STREPTOCOCCUS DNA BLD POS NAA+NON-PROBE: NOT DETECTED
TIBC SERPL-MCNC: <36 UG/DL (ref 250–450)
TROPONIN I SERPL HS-MCNC: 39 NG/L (ref 0–76)
TROPONIN I SERPL HS-MCNC: 58 NG/L (ref 0–76)
TSH SERPL DL<=0.05 MIU/L-ACNC: 20.5 UIU/ML (ref 0.36–3.74)
VIT B12 SERPL-MCNC: 319 PG/ML (ref 193–986)
WBC # BLD AUTO: 26.5 K/UL (ref 4.1–11.1)

## 2025-05-06 PROCEDURE — 2580000003 HC RX 258: Performed by: INTERNAL MEDICINE

## 2025-05-06 PROCEDURE — 92610 EVALUATE SWALLOWING FUNCTION: CPT

## 2025-05-06 PROCEDURE — 80053 COMPREHEN METABOLIC PANEL: CPT

## 2025-05-06 PROCEDURE — 80143 DRUG ASSAY ACETAMINOPHEN: CPT

## 2025-05-06 PROCEDURE — 82607 VITAMIN B-12: CPT

## 2025-05-06 PROCEDURE — 2060000000 HC ICU INTERMEDIATE R&B

## 2025-05-06 PROCEDURE — 2500000003 HC RX 250 WO HCPCS: Performed by: INTERNAL MEDICINE

## 2025-05-06 PROCEDURE — 83550 IRON BINDING TEST: CPT

## 2025-05-06 PROCEDURE — 82746 ASSAY OF FOLIC ACID SERUM: CPT

## 2025-05-06 PROCEDURE — 6360000002 HC RX W HCPCS: Performed by: INTERNAL MEDICINE

## 2025-05-06 PROCEDURE — 84100 ASSAY OF PHOSPHORUS: CPT

## 2025-05-06 PROCEDURE — 87086 URINE CULTURE/COLONY COUNT: CPT

## 2025-05-06 PROCEDURE — 84443 ASSAY THYROID STIM HORMONE: CPT

## 2025-05-06 PROCEDURE — 84484 ASSAY OF TROPONIN QUANT: CPT

## 2025-05-06 PROCEDURE — 84145 PROCALCITONIN (PCT): CPT

## 2025-05-06 PROCEDURE — 99223 1ST HOSP IP/OBS HIGH 75: CPT

## 2025-05-06 PROCEDURE — 6370000000 HC RX 637 (ALT 250 FOR IP): Performed by: INTERNAL MEDICINE

## 2025-05-06 PROCEDURE — 74177 CT ABD & PELVIS W/CONTRAST: CPT

## 2025-05-06 PROCEDURE — 82728 ASSAY OF FERRITIN: CPT

## 2025-05-06 PROCEDURE — 85025 COMPLETE CBC W/AUTO DIFF WBC: CPT

## 2025-05-06 PROCEDURE — 6360000004 HC RX CONTRAST MEDICATION: Performed by: RADIOLOGY

## 2025-05-06 PROCEDURE — 83735 ASSAY OF MAGNESIUM: CPT

## 2025-05-06 PROCEDURE — 83540 ASSAY OF IRON: CPT

## 2025-05-06 RX ORDER — ENTACAPONE 200 MG/1
200 TABLET ORAL 3 TIMES DAILY
Status: DISCONTINUED | OUTPATIENT
Start: 2025-05-06 | End: 2025-05-08 | Stop reason: ALTCHOICE

## 2025-05-06 RX ORDER — SODIUM CHLORIDE 9 MG/ML
INJECTION, SOLUTION INTRAVENOUS CONTINUOUS
Status: DISCONTINUED | OUTPATIENT
Start: 2025-05-06 | End: 2025-05-09

## 2025-05-06 RX ORDER — CARBIDOPA AND LEVODOPA 50; 200 MG/1; MG/1
1 TABLET, EXTENDED RELEASE ORAL 3 TIMES DAILY
Status: DISCONTINUED | OUTPATIENT
Start: 2025-05-06 | End: 2025-05-08 | Stop reason: ALTCHOICE

## 2025-05-06 RX ORDER — POTASSIUM CHLORIDE 750 MG/1
40 TABLET, EXTENDED RELEASE ORAL ONCE
Status: DISCONTINUED | OUTPATIENT
Start: 2025-05-06 | End: 2025-05-06

## 2025-05-06 RX ORDER — SODIUM CHLORIDE 0.9 % (FLUSH) 0.9 %
5-40 SYRINGE (ML) INJECTION PRN
Status: DISCONTINUED | OUTPATIENT
Start: 2025-05-06 | End: 2025-05-14 | Stop reason: HOSPADM

## 2025-05-06 RX ORDER — SODIUM CHLORIDE 9 MG/ML
INJECTION, SOLUTION INTRAVENOUS PRN
Status: DISCONTINUED | OUTPATIENT
Start: 2025-05-06 | End: 2025-05-14 | Stop reason: HOSPADM

## 2025-05-06 RX ORDER — ACETAMINOPHEN 325 MG/1
650 TABLET ORAL EVERY 6 HOURS PRN
Status: DISCONTINUED | OUTPATIENT
Start: 2025-05-06 | End: 2025-05-14 | Stop reason: HOSPADM

## 2025-05-06 RX ORDER — IOPAMIDOL 755 MG/ML
100 INJECTION, SOLUTION INTRAVASCULAR
Status: COMPLETED | OUTPATIENT
Start: 2025-05-06 | End: 2025-05-06

## 2025-05-06 RX ORDER — LIOTHYRONINE SODIUM 5 UG/1
5 TABLET ORAL 2 TIMES DAILY
Status: DISCONTINUED | OUTPATIENT
Start: 2025-05-06 | End: 2025-05-08

## 2025-05-06 RX ORDER — SODIUM CHLORIDE 0.9 % (FLUSH) 0.9 %
5-40 SYRINGE (ML) INJECTION EVERY 12 HOURS SCHEDULED
Status: DISCONTINUED | OUTPATIENT
Start: 2025-05-06 | End: 2025-05-14 | Stop reason: HOSPADM

## 2025-05-06 RX ORDER — ACETAMINOPHEN 650 MG/1
650 SUPPOSITORY RECTAL EVERY 6 HOURS PRN
Status: DISCONTINUED | OUTPATIENT
Start: 2025-05-06 | End: 2025-05-14 | Stop reason: HOSPADM

## 2025-05-06 RX ORDER — CARBIDOPA AND LEVODOPA 25; 100 MG/1; MG/1
2 TABLET ORAL
Status: DISCONTINUED | OUTPATIENT
Start: 2025-05-06 | End: 2025-05-08 | Stop reason: ALTCHOICE

## 2025-05-06 RX ORDER — LEVOTHYROXINE SODIUM 25 UG/1
25 TABLET ORAL 2 TIMES DAILY
Status: DISCONTINUED | OUTPATIENT
Start: 2025-05-06 | End: 2025-05-06

## 2025-05-06 RX ORDER — PAROXETINE 20 MG/1
30 TABLET, FILM COATED ORAL NIGHTLY
Status: DISCONTINUED | OUTPATIENT
Start: 2025-05-06 | End: 2025-05-08

## 2025-05-06 RX ORDER — CARBIDOPA AND LEVODOPA 25; 100 MG/1; MG/1
1 TABLET ORAL 3 TIMES DAILY
Status: DISCONTINUED | OUTPATIENT
Start: 2025-05-06 | End: 2025-05-06 | Stop reason: SDUPTHER

## 2025-05-06 RX ORDER — LEVOTHYROXINE SODIUM 50 UG/1
100 TABLET ORAL DAILY
Status: DISCONTINUED | OUTPATIENT
Start: 2025-05-07 | End: 2025-05-08

## 2025-05-06 RX ORDER — LIOTHYRONINE SODIUM 5 UG/1
10 TABLET ORAL DAILY
Status: DISCONTINUED | OUTPATIENT
Start: 2025-05-06 | End: 2025-05-06 | Stop reason: SDUPTHER

## 2025-05-06 RX ORDER — ENOXAPARIN SODIUM 100 MG/ML
40 INJECTION SUBCUTANEOUS DAILY
Status: DISCONTINUED | OUTPATIENT
Start: 2025-05-06 | End: 2025-05-07

## 2025-05-06 RX ADMIN — VANCOMYCIN HYDROCHLORIDE 1750 MG: 10 INJECTION, POWDER, LYOPHILIZED, FOR SOLUTION INTRAVENOUS at 05:50

## 2025-05-06 RX ADMIN — PIPERACILLIN AND TAZOBACTAM 3375 MG: 3; .375 INJECTION, POWDER, LYOPHILIZED, FOR SOLUTION INTRAVENOUS at 08:17

## 2025-05-06 RX ADMIN — SODIUM CHLORIDE, PRESERVATIVE FREE 10 ML: 5 INJECTION INTRAVENOUS at 23:23

## 2025-05-06 RX ADMIN — ACETAMINOPHEN 650 MG: 650 SUPPOSITORY RECTAL at 08:00

## 2025-05-06 RX ADMIN — IRON SUCROSE 100 MG: 20 INJECTION, SOLUTION INTRAVENOUS at 10:22

## 2025-05-06 RX ADMIN — IOPAMIDOL 100 ML: 755 INJECTION, SOLUTION INTRAVENOUS at 17:42

## 2025-05-06 RX ADMIN — POTASSIUM PHOSPHATE, MONOBASIC AND POTASSIUM PHOSPHATE, DIBASIC 30 MMOL: 224; 236 INJECTION, SOLUTION, CONCENTRATE INTRAVENOUS at 10:25

## 2025-05-06 RX ADMIN — SODIUM CHLORIDE: 0.9 INJECTION, SOLUTION INTRAVENOUS at 05:49

## 2025-05-06 RX ADMIN — WATER 2000 MG: 1 INJECTION INTRAMUSCULAR; INTRAVENOUS; SUBCUTANEOUS at 17:00

## 2025-05-06 RX ADMIN — ENOXAPARIN SODIUM 40 MG: 100 INJECTION SUBCUTANEOUS at 08:17

## 2025-05-06 NOTE — ED NOTES
Pt wife called RN into room stating that pt feels \"full\". No fluid appears to have drained from second bag of CBI and urine in tubing is isabel blood. This RN attempted to irrigate remy to dislodge possible clot with  no success. Additional staff called for assistance. Irrigation remained unsuccessful. When remy balloon was deflated, pt was urinating blood around remy. 18g 3 way was removed and an attempt was made to reinsert a 22g 3 way remy but it was unable to be placed. Dr. Esteban was made aware and consult to urology was placed. Pt currently has no remy in place.

## 2025-05-06 NOTE — H&P
which requires the highest level of preparedness to intervene urgently. I participated in the decision-making and personally managed or directed the management of the following life and organ supporting interventions that required my frequent assessment to treat or prevent imminent deterioration.  I personally spent 45 minutes of critical care time.  This is time spent at this critically ill patient's bedside actively involved in patient care as well as the coordination of care and discussions with the patient's family.  This does not include any procedural time which has been billed separately.  Code sepsis was called on Fan Vang    Sepsis present due to SIRS at least 2/4 WBC>12 or<4   Sepsis Source  Other unknown source  Lactic Acid Greater > 2, Repeat Lactic Acid ordered within 4 hrs NO because initial lactic acid level was normal  Severe? No  Shock present? No   Sepsis OrderSet Used? abnormal skin pigmentation    Sepsis Re-Assessment Documentation:     Date: 5/6/2025   Time: 8:59 AM    The sepsis reassessment was performed at 2040 time          Signed By: Kylee Esteban MD     May 6, 2025         Please note that this dictation may have been completed with Dragon, the computer voice recognition software.  Quite often unanticipated grammatical, syntax, homophones, and other interpretive errors are inadvertently transcribed by the computer software.  Please disregard these errors.  Please excuse any errors that have escaped final proofreading.

## 2025-05-06 NOTE — ED PROVIDER NOTES
[facility] EMERGENCY DEPARTMENT  EMERGENCY DEPARTMENT ENCOUNTER      Pt Name: Fan Vang  MRN: [id number]  Birthdate 1950  Date of evaluation: 5/5/2025  Provider: Kayley Tomlinson DO    CHIEF COMPLAINT       Chief Complaint   Patient presents with   • Altered Mental Status       PMH   Past Medical History:   Diagnosis Date   • Arthritis     back and left knee   • Dependence on walking stick     bilateral/single hiking stick if walking distance   • Fractures left greater trochanter, chronic right ribs 4-7   • Gout    • Osteoarthritis    • Parkinson's disease (HCC)    • S/P deep brain stimulator placement     pt has a remote for this device   • Thyroid cancer (HCC)     radioactive iodine 4/2020, and partial thyroidectomy   • Uses brace     back brace         MDM:   Vitals:    Vitals:    05/05/25 1920   BP: (!) 89/56   Pulse: 69   Resp: 12   Temp:    SpO2: 93%           This is a 74 y.o. male with pmhx parkinson's, thyroid cancer s/p thyroidectomy, dementia baseline aox0-1 who presents today for cc of AMS . Per family patient started appearing more fatigued yesterday, and today refused to get out of bed and was difficult to awaken. Patient unable to give history due to acuity of condition, but does deny any current pain.     On arrival VS includes fever 100.7F, hypoxia to 88% on RA and 93% on 2L NC, otherwise stable.     Physical Exam  General: Somnolent, arouses to voice  HEENT: Normocephalic, atraumatic. EOMI, dry oral mucosa, no conjunctival injection. R pupil 6mm minimally responsive, L pupil 4mm responsive  Neck: ROM normal, supple  Cardio: Heart regular rate and regular rhythm, cap refill <2seconds  Lungs: no respiratory distress, lungs CTAB, no wheezes, rhonchi or rales   Abdomen: abdomen soft, nontender  MSK:ROM normal, no LE edema  Skin: Warm, dry, no rash  Neuro: No focal neurodeficits, GCS 12 E3V3M6    Tylenol given for fever.     Labs notable for elevated WBC, concern for sepsis and rocephin

## 2025-05-06 NOTE — ED NOTES
Pino continues draining. Urine appears rust colored which the wife states is normal d/t his one medication. There are minimal clots

## 2025-05-06 NOTE — ED NOTES
S/w  urology. Will set up cystoscope and difficult remy cart. Pt is agitated. Dr. Esteban paged for possible meds.

## 2025-05-06 NOTE — ED NOTES
Urology was able to place a new remy using cystoscopy. States original remy was in a false passage in the prostate. Urine is avila but not clots noted. Will monitor for clots as CBI is not possible with remy that was able to be inserted.

## 2025-05-07 ENCOUNTER — APPOINTMENT (OUTPATIENT)
Facility: HOSPITAL | Age: 75
DRG: 871 | End: 2025-05-07
Payer: MEDICARE

## 2025-05-07 PROBLEM — B96.20 BACTEREMIA DUE TO ESCHERICHIA COLI: Status: ACTIVE | Noted: 2025-05-07

## 2025-05-07 PROBLEM — R65.20 SEVERE SEPSIS (HCC): Status: ACTIVE | Noted: 2025-05-05

## 2025-05-07 PROBLEM — J69.0 ASPIRATION PNEUMONIA OF RIGHT LOWER LOBE DUE TO GASTRIC SECRETIONS (HCC): Status: ACTIVE | Noted: 2025-05-07

## 2025-05-07 PROBLEM — N12 PYELONEPHRITIS: Status: ACTIVE | Noted: 2025-05-07

## 2025-05-07 PROBLEM — R78.81 BACTEREMIA DUE TO ESCHERICHIA COLI: Status: ACTIVE | Noted: 2025-05-07

## 2025-05-07 LAB
BACTERIA SPEC CULT: NORMAL
COMMENT:: NORMAL
SERVICE CMNT-IMP: NORMAL
SPECIMEN HOLD: NORMAL
TROPONIN I SERPL HS-MCNC: 45 NG/L (ref 0–76)

## 2025-05-07 PROCEDURE — 84484 ASSAY OF TROPONIN QUANT: CPT

## 2025-05-07 PROCEDURE — 94760 N-INVAS EAR/PLS OXIMETRY 1: CPT

## 2025-05-07 PROCEDURE — 99223 1ST HOSP IP/OBS HIGH 75: CPT | Performed by: INTERNAL MEDICINE

## 2025-05-07 PROCEDURE — APPNB30 APP NON BILLABLE TIME 0-30 MINS

## 2025-05-07 PROCEDURE — 2060000000 HC ICU INTERMEDIATE R&B

## 2025-05-07 PROCEDURE — 74018 RADEX ABDOMEN 1 VIEW: CPT

## 2025-05-07 PROCEDURE — 99232 SBSQ HOSP IP/OBS MODERATE 35: CPT

## 2025-05-07 PROCEDURE — 2580000003 HC RX 258

## 2025-05-07 PROCEDURE — 2700000000 HC OXYGEN THERAPY PER DAY

## 2025-05-07 PROCEDURE — 2500000003 HC RX 250 WO HCPCS: Performed by: INTERNAL MEDICINE

## 2025-05-07 PROCEDURE — 6370000000 HC RX 637 (ALT 250 FOR IP): Performed by: INTERNAL MEDICINE

## 2025-05-07 PROCEDURE — 6360000002 HC RX W HCPCS

## 2025-05-07 PROCEDURE — 2580000003 HC RX 258: Performed by: INTERNAL MEDICINE

## 2025-05-07 PROCEDURE — G0545 PR INHERENT VISIT TO INPT: HCPCS | Performed by: INTERNAL MEDICINE

## 2025-05-07 RX ORDER — CASTOR OIL AND BALSAM, PERU 788; 87 MG/G; MG/G
OINTMENT TOPICAL 2 TIMES DAILY
Status: DISCONTINUED | OUTPATIENT
Start: 2025-05-07 | End: 2025-05-14 | Stop reason: HOSPADM

## 2025-05-07 RX ADMIN — Medication: at 20:37

## 2025-05-07 RX ADMIN — SODIUM CHLORIDE: 0.9 INJECTION, SOLUTION INTRAVENOUS at 13:28

## 2025-05-07 RX ADMIN — SODIUM CHLORIDE: 0.9 INJECTION, SOLUTION INTRAVENOUS at 02:25

## 2025-05-07 RX ADMIN — PIPERACILLIN AND TAZOBACTAM 3375 MG: 3; .375 INJECTION, POWDER, LYOPHILIZED, FOR SOLUTION INTRAVENOUS at 20:25

## 2025-05-07 RX ADMIN — SODIUM CHLORIDE, PRESERVATIVE FREE 10 ML: 5 INJECTION INTRAVENOUS at 13:11

## 2025-05-07 RX ADMIN — PIPERACILLIN AND TAZOBACTAM 4500 MG: 4; .5 INJECTION, POWDER, LYOPHILIZED, FOR SOLUTION INTRAVENOUS at 15:10

## 2025-05-07 RX ADMIN — Medication: at 13:10

## 2025-05-07 ASSESSMENT — PAIN SCALES - GENERAL: PAINLEVEL_OUTOF10: 0

## 2025-05-07 NOTE — CARE COORDINATION
Cm attempted to complete initial assessment. Patient is asleep at the bedside. CM called and left a voicemail for patient's wife Rianna Vang. Per chart review patient is from memory care at [facility]. CM sent referral in Epic. They will be able to accept patient pending bed availability. Patient currently has an NGT and is receiving tube feeds.    CM to continue to follow for LAURIE needs.    Nupur Lorenzo, BSN, RN, ONC, CMSRN  Nurse Care Manager, [phone]

## 2025-05-08 ENCOUNTER — APPOINTMENT (OUTPATIENT)
Facility: HOSPITAL | Age: 75
DRG: 871 | End: 2025-05-08
Payer: MEDICARE

## 2025-05-08 PROBLEM — A41.9 SEPSIS (HCC): Status: ACTIVE | Noted: 2025-05-08

## 2025-05-08 LAB
ANION GAP SERPL CALC-SCNC: 6 MMOL/L (ref 2–12)
BACTERIA SPEC CULT: ABNORMAL
BACTERIA SPEC CULT: NORMAL
BACTERIA SPEC CULT: NORMAL
BUN SERPL-MCNC: 22 MG/DL (ref 6–20)
BUN/CREAT SERPL: 31 (ref 12–20)
CALCIUM SERPL-MCNC: 8.9 MG/DL (ref 8.5–10.1)
CHLORIDE SERPL-SCNC: 112 MMOL/L (ref 97–108)
CO2 SERPL-SCNC: 26 MMOL/L (ref 21–32)
CREAT SERPL-MCNC: 0.71 MG/DL (ref 0.7–1.3)
GLUCOSE SERPL-MCNC: 112 MG/DL (ref 65–100)
MAGNESIUM SERPL-MCNC: 2.2 MG/DL (ref 1.6–2.4)
PHOSPHATE SERPL-MCNC: 2.4 MG/DL (ref 2.6–4.7)
POTASSIUM SERPL-SCNC: 3.1 MMOL/L (ref 3.5–5.1)
SERVICE CMNT-IMP: ABNORMAL
SERVICE CMNT-IMP: ABNORMAL
SERVICE CMNT-IMP: NORMAL
SODIUM SERPL-SCNC: 144 MMOL/L (ref 136–145)

## 2025-05-08 PROCEDURE — 2500000003 HC RX 250 WO HCPCS: Performed by: INTERNAL MEDICINE

## 2025-05-08 PROCEDURE — 6360000002 HC RX W HCPCS

## 2025-05-08 PROCEDURE — 6360000002 HC RX W HCPCS: Performed by: NURSE PRACTITIONER

## 2025-05-08 PROCEDURE — G0545 PR INHERENT VISIT TO INPT: HCPCS | Performed by: INTERNAL MEDICINE

## 2025-05-08 PROCEDURE — 80048 BASIC METABOLIC PNL TOTAL CA: CPT

## 2025-05-08 PROCEDURE — 99233 SBSQ HOSP IP/OBS HIGH 50: CPT | Performed by: INTERNAL MEDICINE

## 2025-05-08 PROCEDURE — 2580000003 HC RX 258

## 2025-05-08 PROCEDURE — 83735 ASSAY OF MAGNESIUM: CPT

## 2025-05-08 PROCEDURE — 2700000000 HC OXYGEN THERAPY PER DAY

## 2025-05-08 PROCEDURE — 97535 SELF CARE MNGMENT TRAINING: CPT

## 2025-05-08 PROCEDURE — 6360000002 HC RX W HCPCS: Performed by: INTERNAL MEDICINE

## 2025-05-08 PROCEDURE — 87040 BLOOD CULTURE FOR BACTERIA: CPT

## 2025-05-08 PROCEDURE — 97165 OT EVAL LOW COMPLEX 30 MIN: CPT

## 2025-05-08 PROCEDURE — 2580000003 HC RX 258: Performed by: INTERNAL MEDICINE

## 2025-05-08 PROCEDURE — 97161 PT EVAL LOW COMPLEX 20 MIN: CPT

## 2025-05-08 PROCEDURE — 74018 RADEX ABDOMEN 1 VIEW: CPT

## 2025-05-08 PROCEDURE — 99232 SBSQ HOSP IP/OBS MODERATE 35: CPT

## 2025-05-08 PROCEDURE — APPNB30 APP NON BILLABLE TIME 0-30 MINS

## 2025-05-08 PROCEDURE — 6370000000 HC RX 637 (ALT 250 FOR IP): Performed by: HOSPITALIST

## 2025-05-08 PROCEDURE — 84100 ASSAY OF PHOSPHORUS: CPT

## 2025-05-08 PROCEDURE — 94760 N-INVAS EAR/PLS OXIMETRY 1: CPT

## 2025-05-08 PROCEDURE — 2060000000 HC ICU INTERMEDIATE R&B

## 2025-05-08 PROCEDURE — 92526 ORAL FUNCTION THERAPY: CPT

## 2025-05-08 PROCEDURE — 97530 THERAPEUTIC ACTIVITIES: CPT

## 2025-05-08 RX ORDER — LEVOTHYROXINE SODIUM 50 UG/1
50 TABLET ORAL DAILY
COMMUNITY

## 2025-05-08 RX ORDER — METRONIDAZOLE 500 MG/100ML
500 INJECTION, SOLUTION INTRAVENOUS EVERY 8 HOURS
Status: DISCONTINUED | OUTPATIENT
Start: 2025-05-08 | End: 2025-05-12

## 2025-05-08 RX ORDER — ENTACAPONE 200 MG/1
200 TABLET ORAL
Status: DISCONTINUED | OUTPATIENT
Start: 2025-05-08 | End: 2025-05-14 | Stop reason: HOSPADM

## 2025-05-08 RX ORDER — HYDRALAZINE HYDROCHLORIDE 20 MG/ML
10 INJECTION INTRAMUSCULAR; INTRAVENOUS EVERY 6 HOURS PRN
Status: DISCONTINUED | OUTPATIENT
Start: 2025-05-08 | End: 2025-05-14 | Stop reason: HOSPADM

## 2025-05-08 RX ORDER — CARBIDOPA AND LEVODOPA 25; 100 MG/1; MG/1
2 TABLET ORAL
Status: DISCONTINUED | OUTPATIENT
Start: 2025-05-08 | End: 2025-05-14 | Stop reason: HOSPADM

## 2025-05-08 RX ORDER — PAROXETINE 20 MG/1
30 TABLET, FILM COATED ORAL NIGHTLY
Status: DISCONTINUED | OUTPATIENT
Start: 2025-05-08 | End: 2025-05-08

## 2025-05-08 RX ORDER — LIOTHYRONINE SODIUM 5 UG/1
5 TABLET ORAL 2 TIMES DAILY
Status: DISCONTINUED | OUTPATIENT
Start: 2025-05-08 | End: 2025-05-14 | Stop reason: HOSPADM

## 2025-05-08 RX ORDER — ENTACAPONE 200 MG/1
200 TABLET ORAL
Status: DISCONTINUED | OUTPATIENT
Start: 2025-05-08 | End: 2025-05-08

## 2025-05-08 RX ORDER — CARBIDOPA AND LEVODOPA 25; 100 MG/1; MG/1
2 TABLET ORAL
Status: DISCONTINUED | OUTPATIENT
Start: 2025-05-08 | End: 2025-05-08

## 2025-05-08 RX ORDER — LIOTHYRONINE SODIUM 5 UG/1
5 TABLET ORAL 2 TIMES DAILY
Status: DISCONTINUED | OUTPATIENT
Start: 2025-05-08 | End: 2025-05-08

## 2025-05-08 RX ORDER — PAROXETINE 20 MG/1
30 TABLET, FILM COATED ORAL NIGHTLY
Status: DISCONTINUED | OUTPATIENT
Start: 2025-05-08 | End: 2025-05-14 | Stop reason: HOSPADM

## 2025-05-08 RX ORDER — LEVOTHYROXINE SODIUM 125 UG/1
62.5 TABLET ORAL DAILY
Status: DISCONTINUED | OUTPATIENT
Start: 2025-05-09 | End: 2025-05-08

## 2025-05-08 RX ORDER — MIRTAZAPINE 15 MG/1
45 TABLET, FILM COATED ORAL NIGHTLY
Status: DISCONTINUED | OUTPATIENT
Start: 2025-05-08 | End: 2025-05-08

## 2025-05-08 RX ORDER — SENNA AND DOCUSATE SODIUM 50; 8.6 MG/1; MG/1
1 TABLET, FILM COATED ORAL DAILY PRN
COMMUNITY

## 2025-05-08 RX ORDER — DICLOFENAC SODIUM 75 MG/1
75 TABLET, DELAYED RELEASE ORAL 2 TIMES DAILY
COMMUNITY

## 2025-05-08 RX ORDER — MIRTAZAPINE 15 MG/1
45 TABLET, FILM COATED ORAL NIGHTLY
Status: DISCONTINUED | OUTPATIENT
Start: 2025-05-08 | End: 2025-05-14 | Stop reason: HOSPADM

## 2025-05-08 RX ORDER — CARBIDOPA, LEVODOPA AND ENTACAPONE 50; 200; 200 MG/1; MG/1; MG/1
1 TABLET, FILM COATED ORAL SEE ADMIN INSTRUCTIONS
COMMUNITY

## 2025-05-08 RX ORDER — LEVOTHYROXINE SODIUM 125 UG/1
62.5 TABLET ORAL DAILY
Status: DISCONTINUED | OUTPATIENT
Start: 2025-05-09 | End: 2025-05-14 | Stop reason: HOSPADM

## 2025-05-08 RX ORDER — HYDROXYZINE HYDROCHLORIDE 25 MG/1
25 TABLET, FILM COATED ORAL 2 TIMES DAILY PRN
COMMUNITY

## 2025-05-08 RX ADMIN — SODIUM CHLORIDE: 0.9 INJECTION, SOLUTION INTRAVENOUS at 10:33

## 2025-05-08 RX ADMIN — SODIUM CHLORIDE: 0.9 INJECTION, SOLUTION INTRAVENOUS at 00:28

## 2025-05-08 RX ADMIN — ENTACAPONE 200 MG: 200 TABLET, FILM COATED ORAL at 16:39

## 2025-05-08 RX ADMIN — HYDRALAZINE HYDROCHLORIDE 10 MG: 20 INJECTION INTRAMUSCULAR; INTRAVENOUS at 00:22

## 2025-05-08 RX ADMIN — CARBIDOPA AND LEVODOPA 2 TABLET: 25; 100 TABLET ORAL at 13:49

## 2025-05-08 RX ADMIN — METRONIDAZOLE 500 MG: 500 INJECTION, SOLUTION INTRAVENOUS at 14:15

## 2025-05-08 RX ADMIN — MIRTAZAPINE 45 MG: 15 TABLET, FILM COATED ORAL at 20:45

## 2025-05-08 RX ADMIN — WATER 2000 MG: 1 INJECTION INTRAMUSCULAR; INTRAVENOUS; SUBCUTANEOUS at 13:49

## 2025-05-08 RX ADMIN — SODIUM CHLORIDE: 0.9 INJECTION, SOLUTION INTRAVENOUS at 21:04

## 2025-05-08 RX ADMIN — Medication: at 10:28

## 2025-05-08 RX ADMIN — METRONIDAZOLE 500 MG: 500 INJECTION, SOLUTION INTRAVENOUS at 20:51

## 2025-05-08 RX ADMIN — PAROXETINE HYDROCHLORIDE 30 MG: 20 TABLET, FILM COATED ORAL at 21:30

## 2025-05-08 RX ADMIN — HYDRALAZINE HYDROCHLORIDE 10 MG: 20 INJECTION INTRAMUSCULAR; INTRAVENOUS at 10:27

## 2025-05-08 RX ADMIN — CARBIDOPA AND LEVODOPA 2 TABLET: 25; 100 TABLET ORAL at 20:45

## 2025-05-08 RX ADMIN — PIPERACILLIN AND TAZOBACTAM 3375 MG: 3; .375 INJECTION, POWDER, LYOPHILIZED, FOR SOLUTION INTRAVENOUS at 04:12

## 2025-05-08 RX ADMIN — CARBIDOPA AND LEVODOPA 2 TABLET: 25; 100 TABLET ORAL at 16:38

## 2025-05-08 RX ADMIN — Medication: at 20:51

## 2025-05-08 RX ADMIN — ENTACAPONE 200 MG: 200 TABLET, FILM COATED ORAL at 20:45

## 2025-05-08 RX ADMIN — ENTACAPONE 200 MG: 200 TABLET, FILM COATED ORAL at 13:49

## 2025-05-08 RX ADMIN — ENTACAPONE 200 MG: 200 TABLET, FILM COATED ORAL at 18:09

## 2025-05-08 RX ADMIN — SODIUM CHLORIDE, PRESERVATIVE FREE 10 ML: 5 INJECTION INTRAVENOUS at 20:46

## 2025-05-08 RX ADMIN — CARBIDOPA AND LEVODOPA 2 TABLET: 25; 100 TABLET ORAL at 18:09

## 2025-05-08 RX ADMIN — SODIUM CHLORIDE, PRESERVATIVE FREE 10 ML: 5 INJECTION INTRAVENOUS at 10:28

## 2025-05-08 RX ADMIN — LIOTHYRONINE SODIUM 5 MCG: 5 TABLET ORAL at 20:51

## 2025-05-08 ASSESSMENT — PAIN SCALES - GENERAL
PAINLEVEL_OUTOF10: 0

## 2025-05-08 NOTE — CARE COORDINATION
Care Management Initial Assessment       RUR: 17%  Readmission? No  1st IM letter given? Yes - 5/5/25  1st  letter given: No        05/08/25 0927   Service Assessment   Patient Orientation Alert and Oriented   Cognition Alert   History Provided By Spouse   Primary Caregiver Other (Comment)  (resides in memory care at [facility])   Support Systems Spouse/Significant Other   Patient's Healthcare Decision Maker is: Named in Scanned ACP Document   PCP Verified by CM Yes   Last Visit to PCP Within last 3 months   Prior Functional Level Assistance with the following:;Bathing;Dressing;Toileting;Feeding;Cooking;Housework;Shopping;Mobility   Current Functional Level Assistance with the following:;Bathing;Dressing;Toileting;Feeding;Cooking;Housework;Shopping;Mobility   Can patient return to prior living arrangement Yes   Ability to make needs known: Poor   Family able to assist with home care needs: No   Financial Resources Medicare   Social/Functional History   Lives With Other (Comment)  (Memory Care at [facility])   Type of Home Assisted living  (Memory Care [facility])   Home Layout One level   Home Access Level entry   Bathroom Shower/Tub Walk-in shower   Bathroom Toilet Handicap height   Home Equipment Wheelchair - Manual   Receives Help From Other (Comment)  (memory care at [facility])   Prior Level of Assist for ADLs Needs assistance   Toileting Needs assistance   Prior Level of Assist for Homemaking Needs assistance   Homemaking Responsibilities No   Ambulation Assistance Needs assistance   Prior Level of Assist for Transfers Needs assistance   Active  No   Services At/After Discharge   Mode of Transport at Discharge BLS   Confirm Follow Up Transport Other (see comment)  (S)   Condition of Participation: Discharge Planning   The Plan for Transition of Care is related to the following treatment goals: Return to [facility]   The Patient and/or Patient Representative was provided

## 2025-05-08 NOTE — CONSULTS
Comprehensive Nutrition Assessment    Type and Reason for Visit: Initial, Consult    Nutrition Recommendations/Plan:     Initiate TF via DHT of Jevity 1.5 @ 25 mL/hr.  Advance by 10 mL q 24 hours to goal of 55 mL/hr.  Monitor lytes.  If better tomorrow, may be able to advance TF faster.  If lytes Phos still low, could be d/t lack of nutrition, but given likely PCM and risk of refeeding, recommend starting daily thiamine.   Flush with 50 mL H2o q 4 hours.  Adjust PRN.  Without IVF, consider 150 mL H2o q 4 hours.         Malnutrition Assessment:  Malnutrition Status:  Insufficient data (but he is @risk if he does not already meet PCM criteria) (05/07/25 1526)    Context:  Acute Illness and chronically    Findings of the 6 clinical characteristics of malnutrition:  Energy Intake:  50% or less of estimated energy requirements for 5 or more days  Weight Loss:  Unable to assess     Body Fat Loss:  Unable to assess     Muscle Mass Loss:  Unable to assess (visible muscle wasting, but can be attributed to his disease)    Fluid Accumulation:  No fluid accumulation     Strength:  Not Performed       Nutrition Assessment:    Past Medical History:   Diagnosis Date    Arthritis     back and left knee    Dependence on walking stick     bilateral/single hiking stick if walking distance    Fractures left greater trochanter, chronic right ribs 4-7    Gout     Osteoarthritis     Parkinson's disease (HCC)     S/P deep brain stimulator placement     pt has a remote for this device    Thyroid cancer (HCC)     radioactive iodine 4/2020, and partial thyroidectomy    Uses brace     back brace       Pt admitted with Hypoxemia [R09.02]  Gross hematuria [R31.0]  SIRS (systemic inflammatory response syndrome) (HCC) [R65.10]  Sepsis (HCC) [A41.9]  Hypotension, unspecified hypotension type [I95.9]  Altered mental status, unspecified altered mental status type [R41.82]    RD consulted for tube feeding management.  Chart reviewed.  Spoke with 
Department of Urology  Attending Consult Note      Reason for Consult:  difficult remy hematuria  Requesting Physician:  ER, Dr. Esteban    CHIEF COMPLAINT:  hematuria    History Obtained From:  patient, electronic medical record, nurse    HISTORY OF PRESENT ILLNESS:                The patient is a 74 y.o. male . This is a 74 y.o. male with pmhx parkinson's, thyroid cancer s/p thyroidectomy, dementia baseline aox0-1 who presents today for cc of AMS . Per family patient started appearing more fatigued yesterday, and today refused to get out of bed and was difficult to awaken. Patient unable to give history due to acuity of condition, but does deny any current pain.   Per wife pt has not had hematuria or difficulty voiding. Per nursing hematuria started when ER attempted I&O cath unsuccessfully. 18 3 way placed and clogged with cots and ER attempted remplacement of another remy but could not and called urology.        Past Medical History:        Diagnosis Date    Arthritis     back and left knee    Dependence on walking stick     bilateral/single hiking stick if walking distance    Fractures left greater trochanter, chronic right ribs 4-7    Gout     Osteoarthritis     Parkinson's disease (HCC)     S/P deep brain stimulator placement     pt has a remote for this device    Thyroid cancer (HCC)     radioactive iodine 4/2020, and partial thyroidectomy    Uses brace     back brace     Past Surgical History:        Procedure Laterality Date    CATARACT EXTRACTION W/ INTRAOCULAR LENS IMPLANT Right     JOINT REPLACEMENT  6-    R knee    NASAL TURBINATE REDUCTION Bilateral     NEUROLOGICAL SURGERY      deep brain stimulator, with remote    OTHER SURGICAL HISTORY      epidural steroid injections     RETINAL DETACHMENT SURGERY Right     THYROIDECTOMY, PARTIAL Left 01/2020    THYROIDECTOMY, PARTIAL Right 10/2019    TOTAL KNEE ARTHROPLASTY Right 2013     Current Medications:   Current Facility-Administered Medications: 
Infectious Disease Consult    INFECTIOUS DISEASE Attending:     I agree with the above infectious disease daily progress note in its entirety as authored by and discussed in detail with the nurse practitioner.   I have reviewed pertinent laboratory studies, microbiology cultures, radiologic reports with review of the consultations and progress notes as appropriate.   I agree with today's subjective findings, physical examination, assessment and plan of care as described above and discussed extensively with the nurse practitioner.       Lethargic, temp to 100.0, pending dobhoff placement, not very responsive.  Seen for severe sepsis from UTI/Pyelonephritis and E coli bacteremia with concern for aspiration.    Exam: lethargic, not opening eyes, mouth breathing, + increased WOB, no abdominal distention, + remy with yellow and blood tinged urine, deep brain stimulator RCW clean, not inflamed    Plan:    Start Zosyn for not only E coli bacteremia and urosepsis but also for potential aspiration PNA coverage.    Nebs for airway clearance.    Repeat blood cultures x2 sets.    Trend WBC.    Aspiration precautions.    A total time of 80 minutes was spent on today's encounter.  Greater than 50% of the time was spent on the following:  Preparing for visit and chart review.  Obtaining and/or reviewing separately obtained history  Performing a medically appropriate exam and/or evaluation  Counseling and educating a patient/family/caregiver as noted above  Placing relevant orders  Referring and communicating with other professionals (not separately reported)  Independently interpreting results (not separately reported) and communicating results to the patient/family/caregiver  Care coordination (not separately reported) as noted above  Documenting clinical information in the electronic health records (e.g. problem list, visit note) on the day of the encounter      Impression/Plan     74 y.o. male with past medical Hx of Parkinson 
Palliative Medicine  Patient Name: Fan Vang  YOB: 1950  MRN: [id number]  Age: 74 y.o.  Gender: male    Date of Initial Consult: 5/6/25  Date of Service: 5/8/2025  Time: 10:23 AM  Provider: SOFYA Scott CNP  Hospital Day: 4  Admit Date: 5/5/2025  Referring Provider: Dr. Mathis       Reasons for Consultation:  Goals of Care    HISTORY OF PRESENT ILLNESS (HPI):   Fan Vang is a 74 y.o. male with a past medical history of Parkinson's disease s/p deep brain stimulator placement (2018) and associated dementia, prior thyroid cancer s/p partial thyroidectomy, HTN, anxiety disorder who was admitted on 5/5/2025 from [facility] with progressively worsening fatigue, lethargy, altered mental status. In ED, found to be hypotensive, hypoxic, febrile. Abnormal labs with leukocytosis (17), elevated procal (37), also with electrolyte disarray. CT head negative. CT chest negative for PE but did reveal subacute fracture right 10th rib. UA positive, blood cultures with GNRs, likely E. coli. Ongoing hypotension responsive to fluid resuscitation, admitted for further workup with concern of sepsis, started on broad antibiotics. Given progressive chronic conditions, palliative consulted to assist with goals of care.     Psychosocial:  to spouse, Rianna, who goes by \"Nicole\".  46 years. Originally from [place], moved to [place] in 2020 to be closer to their daughter, who has a son with muscular dystrophy. Pt has lived in memory care unit at [facility] since 2024, after several stents of rehabilitation after previous hospitalizations and needing around the clock care. 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 Very active prior to Parkinson's progression, used to be into body building.     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    PALLIATIVE DIAGNOSES:    Sepsis, suspected E. Coli bacteremia   Acute cystitis   Hypotension - improving   Altered mental status   Lethargy   High 
Palliative Medicine  Patient Name: Fan Vang  YOB: 1950  MRN: [id number]  Age: 74 y.o.  Gender: male    Date of Initial Consult: 5/6/25  Date of Service: 5/6/2025  Time: 3:42 PM  Provider: SOFYA Scott CNP  Hospital Day: 2  Admit Date: 5/5/2025  Referring Provider: Dr. Mathis       Reasons for Consultation:  Goals of Care    HISTORY OF PRESENT ILLNESS (HPI):   Fan Vang is a 74 y.o. male with a past medical history of Parkinson's disease s/p deep brain stimulator placement (2018) and associated dementia, prior thyroid cancer s/p partial thyroidectomy, HTN, anxiety disorder who was admitted on 5/5/2025 from [facility] with progressively worsening fatigue, lethargy, altered mental status. In ED, found to be hypotensive, hypoxic, febrile. Abnormal labs with leukocytosis (17), elevated procal (37), also with electrolyte disarray. CT head negative. CT chest negative for PE but did reveal subacute fracture right 10th rib. UA positive, blood cultures with GNRs, likely E. coli. Ongoing hypotension responsive to fluid resuscitation, admitted for further workup with concern of sepsis, started on broad antibiotics. Given progressive chronic conditions, palliative consulted to assist with goals of care.     Psychosocial:  to spouse, Rianna, who goes by \"Nicole\".  46 years. Originally from [place], moved to [place] in 2020 to be closer to their daughter, who has a son with muscular dystrophy. Pt has lived in memory care unit at [facility] since 2024, after several stents of rehabilitation after previous hospitalizations and needing around the clock care. 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 Very active prior to Parkinson's progression, used to be into body building.     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    PALLIATIVE DIAGNOSES:    Sepsis, suspected E. Coli bacteremia   Acute cystitis   Hypotension - improving   Altered mental status   Lethargy   High 
Palliative Medicine Note:     Consult noted, chart reviewed. Pt seen in ED, minimally responsive. No family at bedside. Call placed to spouse, Rianna. She stayed overnight and had gone home, will be back ~1:30 - 2:00 pm. Plan to meet at bedside and will introduce palliative role in person, assist with navigating goals of care.   
care --   Continue current treatment for now, if unable to show signs of improvement/recovery and decompensates further, anticipate further care goals discussions and Nicole opting for a more comfort approach   Code status --   DNR/DNI  Advance care planning --   AMD on file naming Rianna \"Nicole\" as surrogate decision maker  Symptom management --   Primary team currently managing, no current overwhelming symptoms   Initial consult note routed to primary continuity provider and/or primary health care team members  Discussed management with SLP team at bedside, Dr. Mathis   Please call with any palliative questions or concerns.  Palliative Care Team is available via perfect serve or via phone.    Referrals to:   [] Outpatient Palliative Care  [] Home Based Palliative Care  [] Home Based Primary Care  [] Hospice       ADVANCE CARE PLANNING:   [] [facility] Interdisciplinary Team has updated the ACP Navigator with Health Care Decision Maker and Patient Capacity      Primary Decision Maker: Rianna Vang - [facility] - [phone]  Confirm Advance Directive: Yes, on file    Current Code Status: DNR     Goals of Care: Goals of Care and Interventions  Patient/Health Care Proxy Stated Goals: Recovery from acute illness  Medical Interventions:  (DNR/DNI)  Life Goals  Patient and Family Personal Goals: Attempt recovery from acute conditions    Please refer to Palliative Medicine ACP notes for further details.    PALLIATIVE ASSESSMENT:      Palliative Performance Scale (PPS):  PPS: 20    ECOG:   ECOG Status : Completely disabled [4]    Modified ESAS:  Modified-Mona Symptom Assessment Scale (ESAS)  Pain Score: No pain  Dyspnea Score: No shortness of breath    Clinical Pain Assessment (nonverbal scale for severity on nonverbal patients):   Clinical Pain Assessment  Severity: 0  Location: TAMI  Character: TAMI  Duration: TAMI  Factors: TAMI  Frequency: TAMI       NVPS:  Adult Nonverbal Pain Scale (NVPS)  Face: No particular expression or

## 2025-05-09 PROBLEM — N30.90 CYSTITIS: Status: ACTIVE | Noted: 2025-05-09

## 2025-05-09 LAB
BASOPHILS # BLD: 0 K/UL (ref 0–0.1)
BASOPHILS NFR BLD: 0 % (ref 0–1)
COMMENT:: NORMAL
DIFFERENTIAL METHOD BLD: ABNORMAL
EOSINOPHIL # BLD: 0.08 K/UL (ref 0–0.4)
EOSINOPHIL NFR BLD: 1 % (ref 0–7)
ERYTHROCYTE [DISTWIDTH] IN BLOOD BY AUTOMATED COUNT: 14.2 % (ref 11.5–14.5)
HCT VFR BLD AUTO: 28.3 % (ref 36.6–50.3)
HGB BLD-MCNC: 8.8 G/DL (ref 12.1–17)
IMM GRANULOCYTES # BLD AUTO: 0 K/UL
IMM GRANULOCYTES NFR BLD AUTO: 0 %
LYMPHOCYTES # BLD: 0.5 K/UL (ref 0.8–3.5)
LYMPHOCYTES NFR BLD: 6 % (ref 12–49)
MCH RBC QN AUTO: 30 PG (ref 26–34)
MCHC RBC AUTO-ENTMCNC: 31.1 G/DL (ref 30–36.5)
MCV RBC AUTO: 96.6 FL (ref 80–99)
MONOCYTES # BLD: 0.17 K/UL (ref 0–1)
MONOCYTES NFR BLD: 2 % (ref 5–13)
NEUTS SEG # BLD: 7.65 K/UL (ref 1.8–8)
NEUTS SEG NFR BLD: 91 % (ref 32–75)
NRBC # BLD: 0 K/UL (ref 0–0.01)
NRBC BLD-RTO: 0 PER 100 WBC
PLATELET # BLD AUTO: 263 K/UL (ref 150–400)
PMV BLD AUTO: 9.8 FL (ref 8.9–12.9)
RBC # BLD AUTO: 2.93 M/UL (ref 4.1–5.7)
RBC MORPH BLD: ABNORMAL
SPECIMEN HOLD: NORMAL
WBC # BLD AUTO: 8.4 K/UL (ref 4.1–11.1)

## 2025-05-09 PROCEDURE — 6370000000 HC RX 637 (ALT 250 FOR IP): Performed by: HOSPITALIST

## 2025-05-09 PROCEDURE — 2500000003 HC RX 250 WO HCPCS: Performed by: INTERNAL MEDICINE

## 2025-05-09 PROCEDURE — 94760 N-INVAS EAR/PLS OXIMETRY 1: CPT

## 2025-05-09 PROCEDURE — 94660 CPAP INITIATION&MGMT: CPT

## 2025-05-09 PROCEDURE — 1100000000 HC RM PRIVATE

## 2025-05-09 PROCEDURE — 97530 THERAPEUTIC ACTIVITIES: CPT

## 2025-05-09 PROCEDURE — 6360000002 HC RX W HCPCS: Performed by: INTERNAL MEDICINE

## 2025-05-09 PROCEDURE — 85025 COMPLETE CBC W/AUTO DIFF WBC: CPT

## 2025-05-09 PROCEDURE — 92526 ORAL FUNCTION THERAPY: CPT

## 2025-05-09 PROCEDURE — 6370000000 HC RX 637 (ALT 250 FOR IP): Performed by: INTERNAL MEDICINE

## 2025-05-09 PROCEDURE — 6360000002 HC RX W HCPCS: Performed by: NURSE PRACTITIONER

## 2025-05-09 PROCEDURE — 2580000003 HC RX 258: Performed by: INTERNAL MEDICINE

## 2025-05-09 PROCEDURE — 2700000000 HC OXYGEN THERAPY PER DAY

## 2025-05-09 PROCEDURE — 99233 SBSQ HOSP IP/OBS HIGH 50: CPT

## 2025-05-09 RX ADMIN — LEVOTHYROXINE SODIUM 62.5 MCG: 0.12 TABLET ORAL at 07:08

## 2025-05-09 RX ADMIN — LIOTHYRONINE SODIUM 5 MCG: 5 TABLET ORAL at 10:25

## 2025-05-09 RX ADMIN — ENTACAPONE 200 MG: 200 TABLET, FILM COATED ORAL at 13:05

## 2025-05-09 RX ADMIN — METRONIDAZOLE 500 MG: 500 INJECTION, SOLUTION INTRAVENOUS at 04:27

## 2025-05-09 RX ADMIN — POTASSIUM BICARBONATE 40 MEQ: 782 TABLET, EFFERVESCENT ORAL at 13:05

## 2025-05-09 RX ADMIN — ENTACAPONE 200 MG: 200 TABLET, FILM COATED ORAL at 20:31

## 2025-05-09 RX ADMIN — ENTACAPONE 200 MG: 200 TABLET, FILM COATED ORAL at 22:31

## 2025-05-09 RX ADMIN — ENTACAPONE 200 MG: 200 TABLET, FILM COATED ORAL at 15:20

## 2025-05-09 RX ADMIN — CARBIDOPA AND LEVODOPA 2 TABLET: 25; 100 TABLET ORAL at 10:25

## 2025-05-09 RX ADMIN — CARBIDOPA AND LEVODOPA 2 TABLET: 25; 100 TABLET ORAL at 22:09

## 2025-05-09 RX ADMIN — ENTACAPONE 200 MG: 200 TABLET, FILM COATED ORAL at 18:00

## 2025-05-09 RX ADMIN — CARBIDOPA AND LEVODOPA 2 TABLET: 25; 100 TABLET ORAL at 04:20

## 2025-05-09 RX ADMIN — WATER 2000 MG: 1 INJECTION INTRAMUSCULAR; INTRAVENOUS; SUBCUTANEOUS at 13:04

## 2025-05-09 RX ADMIN — METRONIDAZOLE 500 MG: 500 INJECTION, SOLUTION INTRAVENOUS at 22:08

## 2025-05-09 RX ADMIN — ENTACAPONE 200 MG: 200 TABLET, FILM COATED ORAL at 07:08

## 2025-05-09 RX ADMIN — ENTACAPONE 200 MG: 200 TABLET, FILM COATED ORAL at 10:26

## 2025-05-09 RX ADMIN — METRONIDAZOLE 500 MG: 500 INJECTION, SOLUTION INTRAVENOUS at 13:26

## 2025-05-09 RX ADMIN — Medication: at 23:34

## 2025-05-09 RX ADMIN — CARBIDOPA AND LEVODOPA 2 TABLET: 25; 100 TABLET ORAL at 07:08

## 2025-05-09 RX ADMIN — PAROXETINE HYDROCHLORIDE 30 MG: 20 TABLET, FILM COATED ORAL at 22:09

## 2025-05-09 RX ADMIN — SODIUM CHLORIDE, PRESERVATIVE FREE 10 ML: 5 INJECTION INTRAVENOUS at 10:38

## 2025-05-09 RX ADMIN — MIRTAZAPINE 45 MG: 15 TABLET, FILM COATED ORAL at 22:10

## 2025-05-09 RX ADMIN — CARBIDOPA AND LEVODOPA 2 TABLET: 25; 100 TABLET ORAL at 15:20

## 2025-05-09 RX ADMIN — LIOTHYRONINE SODIUM 5 MCG: 5 TABLET ORAL at 22:09

## 2025-05-09 RX ADMIN — CARBIDOPA AND LEVODOPA 2 TABLET: 25; 100 TABLET ORAL at 20:31

## 2025-05-09 RX ADMIN — CARBIDOPA AND LEVODOPA 2 TABLET: 25; 100 TABLET ORAL at 18:00

## 2025-05-09 RX ADMIN — HYDRALAZINE HYDROCHLORIDE 10 MG: 20 INJECTION INTRAMUSCULAR; INTRAVENOUS at 00:17

## 2025-05-09 RX ADMIN — SODIUM CHLORIDE: 0.9 INJECTION, SOLUTION INTRAVENOUS at 10:32

## 2025-05-09 RX ADMIN — ENTACAPONE 200 MG: 200 TABLET, FILM COATED ORAL at 04:20

## 2025-05-09 RX ADMIN — CARBIDOPA AND LEVODOPA 2 TABLET: 25; 100 TABLET ORAL at 13:05

## 2025-05-09 RX ADMIN — POTASSIUM & SODIUM PHOSPHATES POWDER PACK 280-160-250 MG 500 MG: 280-160-250 PACK at 15:20

## 2025-05-09 ASSESSMENT — PAIN SCALES - GENERAL
PAINLEVEL_OUTOF10: 0
PAINLEVEL_OUTOF10: 0

## 2025-05-10 ENCOUNTER — APPOINTMENT (OUTPATIENT)
Facility: HOSPITAL | Age: 75
DRG: 871 | End: 2025-05-10
Payer: MEDICARE

## 2025-05-10 LAB
ANION GAP SERPL CALC-SCNC: 5 MMOL/L (ref 2–12)
BASOPHILS # BLD: 0 K/UL (ref 0–0.1)
BASOPHILS NFR BLD: 0 % (ref 0–1)
BUN SERPL-MCNC: 12 MG/DL (ref 6–20)
BUN/CREAT SERPL: 17 (ref 12–20)
CALCIUM SERPL-MCNC: 8.8 MG/DL (ref 8.5–10.1)
CHLORIDE SERPL-SCNC: 109 MMOL/L (ref 97–108)
CO2 SERPL-SCNC: 30 MMOL/L (ref 21–32)
CREAT SERPL-MCNC: 0.69 MG/DL (ref 0.7–1.3)
D DIMER PPP FEU-MCNC: 4.35 MG/L FEU (ref 0–0.65)
DIFFERENTIAL METHOD BLD: ABNORMAL
EOSINOPHIL # BLD: 0.09 K/UL (ref 0–0.4)
EOSINOPHIL NFR BLD: 1 % (ref 0–7)
ERYTHROCYTE [DISTWIDTH] IN BLOOD BY AUTOMATED COUNT: 14.1 % (ref 11.5–14.5)
GLUCOSE SERPL-MCNC: 98 MG/DL (ref 65–100)
HCT VFR BLD AUTO: 28.6 % (ref 36.6–50.3)
HGB BLD-MCNC: 9.3 G/DL (ref 12.1–17)
IMM GRANULOCYTES # BLD AUTO: 0 K/UL
IMM GRANULOCYTES NFR BLD AUTO: 0 %
LYMPHOCYTES # BLD: 0.62 K/UL (ref 0.8–3.5)
LYMPHOCYTES NFR BLD: 7 % (ref 12–49)
MAGNESIUM SERPL-MCNC: 2 MG/DL (ref 1.6–2.4)
MCH RBC QN AUTO: 30.8 PG (ref 26–34)
MCHC RBC AUTO-ENTMCNC: 32.5 G/DL (ref 30–36.5)
MCV RBC AUTO: 94.7 FL (ref 80–99)
MONOCYTES # BLD: 0.27 K/UL (ref 0–1)
MONOCYTES NFR BLD: 3 % (ref 5–13)
NEUTS SEG # BLD: 7.92 K/UL (ref 1.8–8)
NEUTS SEG NFR BLD: 89 % (ref 32–75)
NRBC # BLD: 0 K/UL (ref 0–0.01)
NRBC BLD-RTO: 0 PER 100 WBC
NT PRO BNP: 5216 PG/ML
PHOSPHATE SERPL-MCNC: 2.4 MG/DL (ref 2.6–4.7)
PLATELET # BLD AUTO: 278 K/UL (ref 150–400)
PMV BLD AUTO: 9.3 FL (ref 8.9–12.9)
POTASSIUM SERPL-SCNC: 3 MMOL/L (ref 3.5–5.1)
RBC # BLD AUTO: 3.02 M/UL (ref 4.1–5.7)
RBC MORPH BLD: ABNORMAL
SODIUM SERPL-SCNC: 144 MMOL/L (ref 136–145)
WBC # BLD AUTO: 8.9 K/UL (ref 4.1–11.1)

## 2025-05-10 PROCEDURE — 6370000000 HC RX 637 (ALT 250 FOR IP): Performed by: NURSE PRACTITIONER

## 2025-05-10 PROCEDURE — 6370000000 HC RX 637 (ALT 250 FOR IP): Performed by: HOSPITALIST

## 2025-05-10 PROCEDURE — 83735 ASSAY OF MAGNESIUM: CPT

## 2025-05-10 PROCEDURE — 84100 ASSAY OF PHOSPHORUS: CPT

## 2025-05-10 PROCEDURE — 71045 X-RAY EXAM CHEST 1 VIEW: CPT

## 2025-05-10 PROCEDURE — 85379 FIBRIN DEGRADATION QUANT: CPT

## 2025-05-10 PROCEDURE — 2500000003 HC RX 250 WO HCPCS: Performed by: INTERNAL MEDICINE

## 2025-05-10 PROCEDURE — 6360000002 HC RX W HCPCS: Performed by: INTERNAL MEDICINE

## 2025-05-10 PROCEDURE — 94760 N-INVAS EAR/PLS OXIMETRY 1: CPT

## 2025-05-10 PROCEDURE — 83880 ASSAY OF NATRIURETIC PEPTIDE: CPT

## 2025-05-10 PROCEDURE — 85025 COMPLETE CBC W/AUTO DIFF WBC: CPT

## 2025-05-10 PROCEDURE — 1100000000 HC RM PRIVATE

## 2025-05-10 PROCEDURE — 80048 BASIC METABOLIC PNL TOTAL CA: CPT

## 2025-05-10 RX ORDER — IOPAMIDOL 755 MG/ML
100 INJECTION, SOLUTION INTRAVASCULAR
Status: COMPLETED | OUTPATIENT
Start: 2025-05-10 | End: 2025-05-11

## 2025-05-10 RX ORDER — IPRATROPIUM BROMIDE AND ALBUTEROL SULFATE 2.5; .5 MG/3ML; MG/3ML
1 SOLUTION RESPIRATORY (INHALATION) EVERY 4 HOURS PRN
Status: DISCONTINUED | OUTPATIENT
Start: 2025-05-10 | End: 2025-05-14 | Stop reason: HOSPADM

## 2025-05-10 RX ORDER — ACETAMINOPHEN 500 MG
1000 TABLET ORAL EVERY 8 HOURS SCHEDULED
Status: DISCONTINUED | OUTPATIENT
Start: 2025-05-10 | End: 2025-05-14 | Stop reason: HOSPADM

## 2025-05-10 RX ADMIN — ACETAMINOPHEN 1000 MG: 500 TABLET ORAL at 14:11

## 2025-05-10 RX ADMIN — CARBIDOPA AND LEVODOPA 2 TABLET: 25; 100 TABLET ORAL at 16:20

## 2025-05-10 RX ADMIN — CARBIDOPA AND LEVODOPA 2 TABLET: 25; 100 TABLET ORAL at 08:39

## 2025-05-10 RX ADMIN — METRONIDAZOLE 500 MG: 500 INJECTION, SOLUTION INTRAVENOUS at 19:42

## 2025-05-10 RX ADMIN — ENTACAPONE 200 MG: 200 TABLET, FILM COATED ORAL at 14:11

## 2025-05-10 RX ADMIN — CARBIDOPA AND LEVODOPA 2 TABLET: 25; 100 TABLET ORAL at 21:37

## 2025-05-10 RX ADMIN — ENTACAPONE 200 MG: 200 TABLET, FILM COATED ORAL at 07:02

## 2025-05-10 RX ADMIN — CARBIDOPA AND LEVODOPA 2 TABLET: 25; 100 TABLET ORAL at 04:37

## 2025-05-10 RX ADMIN — ENTACAPONE 200 MG: 200 TABLET, FILM COATED ORAL at 12:23

## 2025-05-10 RX ADMIN — LEVOTHYROXINE SODIUM 62.5 MCG: 0.12 TABLET ORAL at 07:02

## 2025-05-10 RX ADMIN — ENTACAPONE 200 MG: 200 TABLET, FILM COATED ORAL at 16:20

## 2025-05-10 RX ADMIN — PAROXETINE HYDROCHLORIDE 30 MG: 20 TABLET, FILM COATED ORAL at 21:36

## 2025-05-10 RX ADMIN — ENTACAPONE 200 MG: 200 TABLET, FILM COATED ORAL at 19:37

## 2025-05-10 RX ADMIN — DIBASIC SODIUM PHOSPHATE, MONOBASIC POTASSIUM PHOSPHATE AND MONOBASIC SODIUM PHOSPHATE 1 TABLET: 852; 155; 130 TABLET ORAL at 08:39

## 2025-05-10 RX ADMIN — SODIUM CHLORIDE, PRESERVATIVE FREE 10 ML: 5 INJECTION INTRAVENOUS at 21:38

## 2025-05-10 RX ADMIN — DIBASIC SODIUM PHOSPHATE, MONOBASIC POTASSIUM PHOSPHATE AND MONOBASIC SODIUM PHOSPHATE 1 TABLET: 852; 155; 130 TABLET ORAL at 21:37

## 2025-05-10 RX ADMIN — Medication: at 21:38

## 2025-05-10 RX ADMIN — ACETAMINOPHEN 1000 MG: 500 TABLET ORAL at 21:37

## 2025-05-10 RX ADMIN — WATER 2000 MG: 1 INJECTION INTRAMUSCULAR; INTRAVENOUS; SUBCUTANEOUS at 12:24

## 2025-05-10 RX ADMIN — ENTACAPONE 200 MG: 200 TABLET, FILM COATED ORAL at 08:39

## 2025-05-10 RX ADMIN — METRONIDAZOLE 500 MG: 500 INJECTION, SOLUTION INTRAVENOUS at 12:55

## 2025-05-10 RX ADMIN — Medication 3 MG: at 21:36

## 2025-05-10 RX ADMIN — MIRTAZAPINE 45 MG: 15 TABLET, FILM COATED ORAL at 21:36

## 2025-05-10 RX ADMIN — ENTACAPONE 200 MG: 200 TABLET, FILM COATED ORAL at 04:37

## 2025-05-10 RX ADMIN — ENTACAPONE 200 MG: 200 TABLET, FILM COATED ORAL at 21:37

## 2025-05-10 RX ADMIN — CARBIDOPA AND LEVODOPA 2 TABLET: 25; 100 TABLET ORAL at 18:30

## 2025-05-10 RX ADMIN — SODIUM CHLORIDE, PRESERVATIVE FREE 10 ML: 5 INJECTION INTRAVENOUS at 08:49

## 2025-05-10 RX ADMIN — LIOTHYRONINE SODIUM 5 MCG: 5 TABLET ORAL at 21:36

## 2025-05-10 RX ADMIN — CARBIDOPA AND LEVODOPA 2 TABLET: 25; 100 TABLET ORAL at 12:23

## 2025-05-10 RX ADMIN — CARBIDOPA AND LEVODOPA 2 TABLET: 25; 100 TABLET ORAL at 07:02

## 2025-05-10 RX ADMIN — CARBIDOPA AND LEVODOPA 2 TABLET: 25; 100 TABLET ORAL at 14:11

## 2025-05-10 RX ADMIN — METRONIDAZOLE 500 MG: 500 INJECTION, SOLUTION INTRAVENOUS at 04:45

## 2025-05-10 RX ADMIN — CARBIDOPA AND LEVODOPA 2 TABLET: 25; 100 TABLET ORAL at 19:37

## 2025-05-10 RX ADMIN — LIOTHYRONINE SODIUM 5 MCG: 5 TABLET ORAL at 08:39

## 2025-05-10 RX ADMIN — Medication: at 12:24

## 2025-05-10 RX ADMIN — ENTACAPONE 200 MG: 200 TABLET, FILM COATED ORAL at 18:30

## 2025-05-10 RX ADMIN — POTASSIUM BICARBONATE 40 MEQ: 782 TABLET, EFFERVESCENT ORAL at 08:37

## 2025-05-11 ENCOUNTER — APPOINTMENT (OUTPATIENT)
Facility: HOSPITAL | Age: 75
DRG: 871 | End: 2025-05-11
Payer: MEDICARE

## 2025-05-11 LAB
ANION GAP SERPL CALC-SCNC: 5 MMOL/L (ref 2–12)
ARTERIAL PATENCY WRIST A: POSITIVE
ARTERIAL PATENCY WRIST A: POSITIVE
BASE EXCESS BLD CALC-SCNC: 5.3 MMOL/L
BASE EXCESS BLDV CALC-SCNC: 5.2 MMOL/L
BASOPHILS # BLD: 0.04 K/UL (ref 0–0.1)
BASOPHILS NFR BLD: 0.3 % (ref 0–1)
BDY SITE: ABNORMAL
BDY SITE: ABNORMAL
BUN SERPL-MCNC: 14 MG/DL (ref 6–20)
BUN/CREAT SERPL: 22 (ref 12–20)
CALCIUM SERPL-MCNC: 8.7 MG/DL (ref 8.5–10.1)
CHLORIDE SERPL-SCNC: 108 MMOL/L (ref 97–108)
CO2 SERPL-SCNC: 30 MMOL/L (ref 21–32)
CREAT SERPL-MCNC: 0.63 MG/DL (ref 0.7–1.3)
DIFFERENTIAL METHOD BLD: ABNORMAL
EOSINOPHIL # BLD: 0.13 K/UL (ref 0–0.4)
EOSINOPHIL NFR BLD: 1.1 % (ref 0–7)
ERYTHROCYTE [DISTWIDTH] IN BLOOD BY AUTOMATED COUNT: 13.9 % (ref 11.5–14.5)
GAS FLOW.O2 O2 DELIVERY SYS: ABNORMAL
GLUCOSE SERPL-MCNC: 102 MG/DL (ref 65–100)
HCO3 BLD-SCNC: 32.9 MMOL/L (ref 21–28)
HCO3 BLDV-SCNC: 30.4 MMOL/L (ref 23–28)
HCT VFR BLD AUTO: 27.3 % (ref 36.6–50.3)
HGB BLD-MCNC: 8.9 G/DL (ref 12.1–17)
IMM GRANULOCYTES # BLD AUTO: 0.12 K/UL (ref 0–0.04)
IMM GRANULOCYTES NFR BLD AUTO: 1 % (ref 0–0.5)
LYMPHOCYTES # BLD: 0.71 K/UL (ref 0.8–3.5)
LYMPHOCYTES NFR BLD: 5.9 % (ref 12–49)
MAGNESIUM SERPL-MCNC: 1.9 MG/DL (ref 1.6–2.4)
MCH RBC QN AUTO: 30.5 PG (ref 26–34)
MCHC RBC AUTO-ENTMCNC: 32.6 G/DL (ref 30–36.5)
MCV RBC AUTO: 93.5 FL (ref 80–99)
MONOCYTES # BLD: 0.64 K/UL (ref 0–1)
MONOCYTES NFR BLD: 5.3 % (ref 5–13)
NEUTS SEG # BLD: 10.37 K/UL (ref 1.8–8)
NEUTS SEG NFR BLD: 86.4 % (ref 32–75)
NRBC # BLD: 0 K/UL (ref 0–0.01)
NRBC BLD-RTO: 0 PER 100 WBC
PCO2 BLD: 65.8 MMHG (ref 35–48)
PCO2 BLDV: 46.2 MMHG (ref 41–51)
PH BLD: 7.31 (ref 7.35–7.45)
PH BLDV: 7.43 (ref 7.32–7.42)
PHOSPHATE SERPL-MCNC: 4.1 MG/DL (ref 2.6–4.7)
PLATELET # BLD AUTO: 249 K/UL (ref 150–400)
PMV BLD AUTO: 9.4 FL (ref 8.9–12.9)
PO2 BLD: 177 MMHG (ref 83–108)
PO2 BLDV: 113 MMHG (ref 25–40)
POTASSIUM SERPL-SCNC: 3.1 MMOL/L (ref 3.5–5.1)
RBC # BLD AUTO: 2.92 M/UL (ref 4.1–5.7)
RBC MORPH BLD: ABNORMAL
SAO2 % BLD: 99.4 % (ref 92–97)
SAO2 % BLDV: 98.5 % (ref 65–88)
SERVICE CMNT-IMP: ABNORMAL
SODIUM SERPL-SCNC: 143 MMOL/L (ref 136–145)
SPECIMEN TYPE: ABNORMAL
SPECIMEN TYPE: ABNORMAL
WBC # BLD AUTO: 12 K/UL (ref 4.1–11.1)

## 2025-05-11 PROCEDURE — 2580000003 HC RX 258: Performed by: INTERNAL MEDICINE

## 2025-05-11 PROCEDURE — 2060000000 HC ICU INTERMEDIATE R&B

## 2025-05-11 PROCEDURE — 6360000002 HC RX W HCPCS: Performed by: NURSE PRACTITIONER

## 2025-05-11 PROCEDURE — 6370000000 HC RX 637 (ALT 250 FOR IP): Performed by: NURSE PRACTITIONER

## 2025-05-11 PROCEDURE — 6370000000 HC RX 637 (ALT 250 FOR IP): Performed by: HOSPITALIST

## 2025-05-11 PROCEDURE — 94640 AIRWAY INHALATION TREATMENT: CPT

## 2025-05-11 PROCEDURE — 82803 BLOOD GASES ANY COMBINATION: CPT

## 2025-05-11 PROCEDURE — 6370000000 HC RX 637 (ALT 250 FOR IP): Performed by: INTERNAL MEDICINE

## 2025-05-11 PROCEDURE — 36600 WITHDRAWAL OF ARTERIAL BLOOD: CPT

## 2025-05-11 PROCEDURE — 84100 ASSAY OF PHOSPHORUS: CPT

## 2025-05-11 PROCEDURE — 83735 ASSAY OF MAGNESIUM: CPT

## 2025-05-11 PROCEDURE — 94760 N-INVAS EAR/PLS OXIMETRY 1: CPT

## 2025-05-11 PROCEDURE — 85025 COMPLETE CBC W/AUTO DIFF WBC: CPT

## 2025-05-11 PROCEDURE — 94660 CPAP INITIATION&MGMT: CPT

## 2025-05-11 PROCEDURE — 5A09357 ASSISTANCE WITH RESPIRATORY VENTILATION, LESS THAN 24 CONSECUTIVE HOURS, CONTINUOUS POSITIVE AIRWAY PRESSURE: ICD-10-PCS | Performed by: HOSPITALIST

## 2025-05-11 PROCEDURE — 2500000003 HC RX 250 WO HCPCS: Performed by: INTERNAL MEDICINE

## 2025-05-11 PROCEDURE — 6360000004 HC RX CONTRAST MEDICATION: Performed by: RADIOLOGY

## 2025-05-11 PROCEDURE — 80048 BASIC METABOLIC PNL TOTAL CA: CPT

## 2025-05-11 PROCEDURE — 6360000002 HC RX W HCPCS: Performed by: INTERNAL MEDICINE

## 2025-05-11 PROCEDURE — 71275 CT ANGIOGRAPHY CHEST: CPT

## 2025-05-11 RX ORDER — LORAZEPAM 2 MG/ML
1 INJECTION INTRAMUSCULAR ONCE
Status: COMPLETED | OUTPATIENT
Start: 2025-05-11 | End: 2025-05-11

## 2025-05-11 RX ORDER — LORAZEPAM 2 MG/ML
1 INJECTION INTRAMUSCULAR ONCE
Status: DISCONTINUED | OUTPATIENT
Start: 2025-05-11 | End: 2025-05-11

## 2025-05-11 RX ORDER — POTASSIUM CHLORIDE 7.45 MG/ML
10 INJECTION INTRAVENOUS
Status: COMPLETED | OUTPATIENT
Start: 2025-05-11 | End: 2025-05-11

## 2025-05-11 RX ADMIN — POTASSIUM CHLORIDE 10 MEQ: 7.46 INJECTION, SOLUTION INTRAVENOUS at 09:29

## 2025-05-11 RX ADMIN — ENTACAPONE 200 MG: 200 TABLET, FILM COATED ORAL at 20:54

## 2025-05-11 RX ADMIN — METRONIDAZOLE 500 MG: 500 INJECTION, SOLUTION INTRAVENOUS at 20:57

## 2025-05-11 RX ADMIN — IPRATROPIUM BROMIDE AND ALBUTEROL SULFATE 1 DOSE: .5; 3 SOLUTION RESPIRATORY (INHALATION) at 19:04

## 2025-05-11 RX ADMIN — SODIUM CHLORIDE, PRESERVATIVE FREE 10 ML: 5 INJECTION INTRAVENOUS at 17:22

## 2025-05-11 RX ADMIN — CARBIDOPA AND LEVODOPA 2 TABLET: 25; 100 TABLET ORAL at 20:13

## 2025-05-11 RX ADMIN — DIBASIC SODIUM PHOSPHATE, MONOBASIC POTASSIUM PHOSPHATE AND MONOBASIC SODIUM PHOSPHATE 1 TABLET: 852; 155; 130 TABLET ORAL at 10:24

## 2025-05-11 RX ADMIN — Medication: at 10:19

## 2025-05-11 RX ADMIN — MIRTAZAPINE 45 MG: 15 TABLET, FILM COATED ORAL at 20:54

## 2025-05-11 RX ADMIN — METRONIDAZOLE 500 MG: 500 INJECTION, SOLUTION INTRAVENOUS at 04:10

## 2025-05-11 RX ADMIN — POTASSIUM CHLORIDE 10 MEQ: 7.46 INJECTION, SOLUTION INTRAVENOUS at 08:18

## 2025-05-11 RX ADMIN — ENTACAPONE 200 MG: 200 TABLET, FILM COATED ORAL at 10:24

## 2025-05-11 RX ADMIN — ENTACAPONE 200 MG: 200 TABLET, FILM COATED ORAL at 15:31

## 2025-05-11 RX ADMIN — ENTACAPONE 200 MG: 200 TABLET, FILM COATED ORAL at 17:21

## 2025-05-11 RX ADMIN — LIOTHYRONINE SODIUM 5 MCG: 5 TABLET ORAL at 20:54

## 2025-05-11 RX ADMIN — SODIUM CHLORIDE: 0.9 INJECTION, SOLUTION INTRAVENOUS at 15:32

## 2025-05-11 RX ADMIN — SODIUM CHLORIDE, PRESERVATIVE FREE 10 ML: 5 INJECTION INTRAVENOUS at 20:54

## 2025-05-11 RX ADMIN — PAROXETINE HYDROCHLORIDE 30 MG: 20 TABLET, FILM COATED ORAL at 20:53

## 2025-05-11 RX ADMIN — WATER 2000 MG: 1 INJECTION INTRAMUSCULAR; INTRAVENOUS; SUBCUTANEOUS at 12:45

## 2025-05-11 RX ADMIN — IOPAMIDOL 100 ML: 755 INJECTION, SOLUTION INTRAVENOUS at 16:26

## 2025-05-11 RX ADMIN — Medication 3 MG: at 20:54

## 2025-05-11 RX ADMIN — ACETAMINOPHEN 1000 MG: 500 TABLET ORAL at 15:30

## 2025-05-11 RX ADMIN — POTASSIUM CHLORIDE 10 MEQ: 7.46 INJECTION, SOLUTION INTRAVENOUS at 06:58

## 2025-05-11 RX ADMIN — ENTACAPONE 200 MG: 200 TABLET, FILM COATED ORAL at 12:46

## 2025-05-11 RX ADMIN — SODIUM CHLORIDE, PRESERVATIVE FREE 10 ML: 5 INJECTION INTRAVENOUS at 09:29

## 2025-05-11 RX ADMIN — CARBIDOPA AND LEVODOPA 2 TABLET: 25; 100 TABLET ORAL at 10:24

## 2025-05-11 RX ADMIN — LORAZEPAM 1 MG: 2 INJECTION INTRAMUSCULAR; INTRAVENOUS at 00:28

## 2025-05-11 RX ADMIN — Medication: at 20:55

## 2025-05-11 RX ADMIN — CARBIDOPA AND LEVODOPA 2 TABLET: 25; 100 TABLET ORAL at 12:45

## 2025-05-11 RX ADMIN — LIOTHYRONINE SODIUM 5 MCG: 5 TABLET ORAL at 10:24

## 2025-05-11 RX ADMIN — CARBIDOPA AND LEVODOPA 2 TABLET: 25; 100 TABLET ORAL at 17:21

## 2025-05-11 RX ADMIN — CARBIDOPA AND LEVODOPA 2 TABLET: 25; 100 TABLET ORAL at 20:54

## 2025-05-11 RX ADMIN — DIBASIC SODIUM PHOSPHATE, MONOBASIC POTASSIUM PHOSPHATE AND MONOBASIC SODIUM PHOSPHATE 1 TABLET: 852; 155; 130 TABLET ORAL at 20:54

## 2025-05-11 RX ADMIN — METRONIDAZOLE 500 MG: 500 INJECTION, SOLUTION INTRAVENOUS at 12:54

## 2025-05-11 RX ADMIN — POTASSIUM CHLORIDE 10 MEQ: 7.46 INJECTION, SOLUTION INTRAVENOUS at 10:19

## 2025-05-11 RX ADMIN — ENTACAPONE 200 MG: 200 TABLET, FILM COATED ORAL at 20:14

## 2025-05-11 RX ADMIN — CARBIDOPA AND LEVODOPA 2 TABLET: 25; 100 TABLET ORAL at 15:31

## 2025-05-11 RX ADMIN — HYDRALAZINE HYDROCHLORIDE 10 MG: 20 INJECTION INTRAMUSCULAR; INTRAVENOUS at 08:15

## 2025-05-11 ASSESSMENT — PAIN SCALES - GENERAL
PAINLEVEL_OUTOF10: 0

## 2025-05-11 NOTE — SIGNIFICANT EVENT
Rapid Response  Rapid response room 561 called overhead at 0156. RRT responding.    Rapid response called for  Desaturation    Upon arrival patient saturation displayed mid 80%'s. Patient orientation is baseline Aox0, thus we were unable to determine AMS. A non-rebreather was placed at 15L with efficacy demonstrated at 98%. An ABG was conducted showing CO2 retention and acidosis. Due to this a decision was made to transfer to stepdown for BI-pap therapy.    Vitals are stable at this point, awaiting report to be given and transfer.    Checked in with primary RN prior to leaving. Opportunity for questions and concerns provided.      Rapid Response Team Sepsis Screening  Is the patient's history suggestive of a new infection? No    Are two or more SIRS criteria present? No    Is there evidence of Organ Dysfunction? [facility] Sepsis OD: None    Communication with provider: No    Was a Code Sepsis called at this encounter? No. Why? NA

## 2025-05-12 LAB
ALBUMIN SERPL-MCNC: 2.8 G/DL (ref 3.5–5)
ALBUMIN/GLOB SERPL: 0.8 (ref 1.1–2.2)
ALP SERPL-CCNC: 54 U/L (ref 45–117)
ALT SERPL-CCNC: 7 U/L (ref 12–78)
ANION GAP SERPL CALC-SCNC: 5 MMOL/L (ref 2–12)
ANION GAP SERPL CALC-SCNC: 6 MMOL/L (ref 2–12)
AST SERPL-CCNC: 20 U/L (ref 15–37)
BASOPHILS # BLD: 0.06 K/UL (ref 0–0.1)
BASOPHILS # BLD: 0.07 K/UL (ref 0–0.1)
BASOPHILS NFR BLD: 0.7 % (ref 0–1)
BASOPHILS NFR BLD: 0.8 % (ref 0–1)
BILIRUB SERPL-MCNC: 0.7 MG/DL (ref 0.2–1)
BUN SERPL-MCNC: 10 MG/DL (ref 6–20)
BUN SERPL-MCNC: 10 MG/DL (ref 6–20)
BUN/CREAT SERPL: 16 (ref 12–20)
BUN/CREAT SERPL: 16 (ref 12–20)
CALCIUM SERPL-MCNC: 8.7 MG/DL (ref 8.5–10.1)
CALCIUM SERPL-MCNC: 8.7 MG/DL (ref 8.5–10.1)
CHLORIDE SERPL-SCNC: 106 MMOL/L (ref 97–108)
CHLORIDE SERPL-SCNC: 106 MMOL/L (ref 97–108)
CO2 SERPL-SCNC: 27 MMOL/L (ref 21–32)
CO2 SERPL-SCNC: 28 MMOL/L (ref 21–32)
CREAT SERPL-MCNC: 0.62 MG/DL (ref 0.7–1.3)
CREAT SERPL-MCNC: 0.63 MG/DL (ref 0.7–1.3)
DIFFERENTIAL METHOD BLD: ABNORMAL
DIFFERENTIAL METHOD BLD: ABNORMAL
EOSINOPHIL # BLD: 0.14 K/UL (ref 0–0.4)
EOSINOPHIL # BLD: 0.21 K/UL (ref 0–0.4)
EOSINOPHIL NFR BLD: 1.7 % (ref 0–7)
EOSINOPHIL NFR BLD: 2.3 % (ref 0–7)
ERYTHROCYTE [DISTWIDTH] IN BLOOD BY AUTOMATED COUNT: 14.1 % (ref 11.5–14.5)
ERYTHROCYTE [DISTWIDTH] IN BLOOD BY AUTOMATED COUNT: 14.2 % (ref 11.5–14.5)
GLOBULIN SER CALC-MCNC: 3.6 G/DL (ref 2–4)
GLUCOSE SERPL-MCNC: 100 MG/DL (ref 65–100)
GLUCOSE SERPL-MCNC: 98 MG/DL (ref 65–100)
HCT VFR BLD AUTO: 27.5 % (ref 36.6–50.3)
HCT VFR BLD AUTO: 29.3 % (ref 36.6–50.3)
HGB BLD-MCNC: 9 G/DL (ref 12.1–17)
HGB BLD-MCNC: 9.5 G/DL (ref 12.1–17)
IMM GRANULOCYTES # BLD AUTO: 0.06 K/UL (ref 0–0.04)
IMM GRANULOCYTES # BLD AUTO: 0.1 K/UL (ref 0–0.04)
IMM GRANULOCYTES NFR BLD AUTO: 0.7 % (ref 0–0.5)
IMM GRANULOCYTES NFR BLD AUTO: 1.1 % (ref 0–0.5)
LYMPHOCYTES # BLD: 0.7 K/UL (ref 0.8–3.5)
LYMPHOCYTES # BLD: 0.85 K/UL (ref 0.8–3.5)
LYMPHOCYTES NFR BLD: 8.4 % (ref 12–49)
LYMPHOCYTES NFR BLD: 9.3 % (ref 12–49)
MAGNESIUM SERPL-MCNC: 1.9 MG/DL (ref 1.6–2.4)
MAGNESIUM SERPL-MCNC: 2 MG/DL (ref 1.6–2.4)
MCH RBC QN AUTO: 30.4 PG (ref 26–34)
MCH RBC QN AUTO: 30.4 PG (ref 26–34)
MCHC RBC AUTO-ENTMCNC: 32.4 G/DL (ref 30–36.5)
MCHC RBC AUTO-ENTMCNC: 32.7 G/DL (ref 30–36.5)
MCV RBC AUTO: 92.9 FL (ref 80–99)
MCV RBC AUTO: 93.6 FL (ref 80–99)
MONOCYTES # BLD: 0.53 K/UL (ref 0–1)
MONOCYTES # BLD: 0.56 K/UL (ref 0–1)
MONOCYTES NFR BLD: 6.1 % (ref 5–13)
MONOCYTES NFR BLD: 6.4 % (ref 5–13)
NEUTS SEG # BLD: 6.81 K/UL (ref 1.8–8)
NEUTS SEG # BLD: 7.38 K/UL (ref 1.8–8)
NEUTS SEG NFR BLD: 80.4 % (ref 32–75)
NEUTS SEG NFR BLD: 82.1 % (ref 32–75)
NRBC # BLD: 0 K/UL (ref 0–0.01)
NRBC # BLD: 0 K/UL (ref 0–0.01)
NRBC BLD-RTO: 0 PER 100 WBC
NRBC BLD-RTO: 0 PER 100 WBC
PHOSPHATE SERPL-MCNC: 3.5 MG/DL (ref 2.6–4.7)
PLATELET # BLD AUTO: 295 K/UL (ref 150–400)
PLATELET # BLD AUTO: 307 K/UL (ref 150–400)
PMV BLD AUTO: 9.1 FL (ref 8.9–12.9)
PMV BLD AUTO: 9.3 FL (ref 8.9–12.9)
POTASSIUM SERPL-SCNC: 3 MMOL/L (ref 3.5–5.1)
POTASSIUM SERPL-SCNC: 3 MMOL/L (ref 3.5–5.1)
PROT SERPL-MCNC: 6.4 G/DL (ref 6.4–8.2)
RBC # BLD AUTO: 2.96 M/UL (ref 4.1–5.7)
RBC # BLD AUTO: 3.13 M/UL (ref 4.1–5.7)
RBC MORPH BLD: ABNORMAL
SODIUM SERPL-SCNC: 138 MMOL/L (ref 136–145)
SODIUM SERPL-SCNC: 140 MMOL/L (ref 136–145)
WBC # BLD AUTO: 8.3 K/UL (ref 4.1–11.1)
WBC # BLD AUTO: 9.2 K/UL (ref 4.1–11.1)

## 2025-05-12 PROCEDURE — 6370000000 HC RX 637 (ALT 250 FOR IP): Performed by: INTERNAL MEDICINE

## 2025-05-12 PROCEDURE — 83735 ASSAY OF MAGNESIUM: CPT

## 2025-05-12 PROCEDURE — 2060000000 HC ICU INTERMEDIATE R&B

## 2025-05-12 PROCEDURE — APPNB30 APP NON BILLABLE TIME 0-30 MINS

## 2025-05-12 PROCEDURE — G0545 PR INHERENT VISIT TO INPT: HCPCS | Performed by: INTERNAL MEDICINE

## 2025-05-12 PROCEDURE — 6370000000 HC RX 637 (ALT 250 FOR IP): Performed by: NURSE PRACTITIONER

## 2025-05-12 PROCEDURE — 6360000002 HC RX W HCPCS: Performed by: NURSE PRACTITIONER

## 2025-05-12 PROCEDURE — 97530 THERAPEUTIC ACTIVITIES: CPT

## 2025-05-12 PROCEDURE — 84100 ASSAY OF PHOSPHORUS: CPT

## 2025-05-12 PROCEDURE — 2500000003 HC RX 250 WO HCPCS: Performed by: INTERNAL MEDICINE

## 2025-05-12 PROCEDURE — 80053 COMPREHEN METABOLIC PANEL: CPT

## 2025-05-12 PROCEDURE — 99232 SBSQ HOSP IP/OBS MODERATE 35: CPT | Performed by: INTERNAL MEDICINE

## 2025-05-12 PROCEDURE — 92526 ORAL FUNCTION THERAPY: CPT

## 2025-05-12 PROCEDURE — 6360000002 HC RX W HCPCS: Performed by: INTERNAL MEDICINE

## 2025-05-12 PROCEDURE — 6370000000 HC RX 637 (ALT 250 FOR IP): Performed by: HOSPITALIST

## 2025-05-12 PROCEDURE — 85025 COMPLETE CBC W/AUTO DIFF WBC: CPT

## 2025-05-12 RX ORDER — POTASSIUM CHLORIDE 750 MG/1
40 TABLET, EXTENDED RELEASE ORAL EVERY 6 HOURS
Status: DISCONTINUED | OUTPATIENT
Start: 2025-05-12 | End: 2025-05-12

## 2025-05-12 RX ORDER — POTASSIUM CHLORIDE 750 MG/1
40 TABLET, EXTENDED RELEASE ORAL ONCE
Status: COMPLETED | OUTPATIENT
Start: 2025-05-12 | End: 2025-05-12

## 2025-05-12 RX ADMIN — PAROXETINE HYDROCHLORIDE 30 MG: 20 TABLET, FILM COATED ORAL at 21:03

## 2025-05-12 RX ADMIN — WATER 2000 MG: 1 INJECTION INTRAMUSCULAR; INTRAVENOUS; SUBCUTANEOUS at 11:30

## 2025-05-12 RX ADMIN — ENTACAPONE 200 MG: 200 TABLET, FILM COATED ORAL at 21:02

## 2025-05-12 RX ADMIN — ENTACAPONE 200 MG: 200 TABLET, FILM COATED ORAL at 19:08

## 2025-05-12 RX ADMIN — ENTACAPONE 200 MG: 200 TABLET, FILM COATED ORAL at 06:38

## 2025-05-12 RX ADMIN — Medication: at 20:50

## 2025-05-12 RX ADMIN — SODIUM CHLORIDE, PRESERVATIVE FREE 10 ML: 5 INJECTION INTRAVENOUS at 08:47

## 2025-05-12 RX ADMIN — DIBASIC SODIUM PHOSPHATE, MONOBASIC POTASSIUM PHOSPHATE AND MONOBASIC SODIUM PHOSPHATE 1 TABLET: 852; 155; 130 TABLET ORAL at 21:03

## 2025-05-12 RX ADMIN — CARBIDOPA AND LEVODOPA 2 TABLET: 25; 100 TABLET ORAL at 13:40

## 2025-05-12 RX ADMIN — CARBIDOPA AND LEVODOPA 2 TABLET: 25; 100 TABLET ORAL at 08:40

## 2025-05-12 RX ADMIN — CARBIDOPA AND LEVODOPA 2 TABLET: 25; 100 TABLET ORAL at 16:03

## 2025-05-12 RX ADMIN — HYDRALAZINE HYDROCHLORIDE 10 MG: 20 INJECTION INTRAMUSCULAR; INTRAVENOUS at 03:54

## 2025-05-12 RX ADMIN — ENTACAPONE 200 MG: 200 TABLET, FILM COATED ORAL at 16:03

## 2025-05-12 RX ADMIN — CARBIDOPA AND LEVODOPA 2 TABLET: 25; 100 TABLET ORAL at 11:26

## 2025-05-12 RX ADMIN — POTASSIUM BICARBONATE 40 MEQ: 782 TABLET, EFFERVESCENT ORAL at 11:25

## 2025-05-12 RX ADMIN — DIBASIC SODIUM PHOSPHATE, MONOBASIC POTASSIUM PHOSPHATE AND MONOBASIC SODIUM PHOSPHATE 1 TABLET: 852; 155; 130 TABLET ORAL at 08:40

## 2025-05-12 RX ADMIN — LIOTHYRONINE SODIUM 5 MCG: 5 TABLET ORAL at 08:40

## 2025-05-12 RX ADMIN — CARBIDOPA AND LEVODOPA 2 TABLET: 25; 100 TABLET ORAL at 19:08

## 2025-05-12 RX ADMIN — ACETAMINOPHEN 1000 MG: 500 TABLET ORAL at 21:01

## 2025-05-12 RX ADMIN — CARBIDOPA AND LEVODOPA 2 TABLET: 25; 100 TABLET ORAL at 03:50

## 2025-05-12 RX ADMIN — SODIUM CHLORIDE, PRESERVATIVE FREE 10 ML: 5 INJECTION INTRAVENOUS at 20:51

## 2025-05-12 RX ADMIN — ACETAMINOPHEN 1000 MG: 500 TABLET ORAL at 13:49

## 2025-05-12 RX ADMIN — HYDRALAZINE HYDROCHLORIDE 10 MG: 20 INJECTION INTRAMUSCULAR; INTRAVENOUS at 11:22

## 2025-05-12 RX ADMIN — Medication: at 08:40

## 2025-05-12 RX ADMIN — CARBIDOPA AND LEVODOPA 2 TABLET: 25; 100 TABLET ORAL at 17:22

## 2025-05-12 RX ADMIN — ENTACAPONE 200 MG: 200 TABLET, FILM COATED ORAL at 13:49

## 2025-05-12 RX ADMIN — ENTACAPONE 200 MG: 200 TABLET, FILM COATED ORAL at 03:50

## 2025-05-12 RX ADMIN — LEVOTHYROXINE SODIUM 62.5 MCG: 0.12 TABLET ORAL at 06:39

## 2025-05-12 RX ADMIN — CARBIDOPA AND LEVODOPA 2 TABLET: 25; 100 TABLET ORAL at 06:38

## 2025-05-12 RX ADMIN — POTASSIUM CHLORIDE 40 MEQ: 750 TABLET, FILM COATED, EXTENDED RELEASE ORAL at 17:22

## 2025-05-12 RX ADMIN — ACETAMINOPHEN 650 MG: 325 TABLET ORAL at 01:52

## 2025-05-12 RX ADMIN — METRONIDAZOLE 500 MG: 500 INJECTION, SOLUTION INTRAVENOUS at 03:52

## 2025-05-12 RX ADMIN — Medication 3 MG: at 21:03

## 2025-05-12 RX ADMIN — ENTACAPONE 200 MG: 200 TABLET, FILM COATED ORAL at 08:40

## 2025-05-12 RX ADMIN — ENTACAPONE 200 MG: 200 TABLET, FILM COATED ORAL at 17:23

## 2025-05-12 RX ADMIN — ENTACAPONE 200 MG: 200 TABLET, FILM COATED ORAL at 11:26

## 2025-05-12 RX ADMIN — CARBIDOPA AND LEVODOPA 2 TABLET: 25; 100 TABLET ORAL at 21:02

## 2025-05-12 RX ADMIN — LIOTHYRONINE SODIUM 5 MCG: 5 TABLET ORAL at 21:02

## 2025-05-12 RX ADMIN — POTASSIUM BICARBONATE 40 MEQ: 782 TABLET, EFFERVESCENT ORAL at 05:44

## 2025-05-12 RX ADMIN — MIRTAZAPINE 45 MG: 15 TABLET, FILM COATED ORAL at 21:03

## 2025-05-12 NOTE — CARE COORDINATION
Transition of Care Plan:  D/C to [facility] with  IV abx.      RUR: 18%  Prior Level of Functioning:   Disposition: return to [facility] SNF  MILES: Tuesday 5/13/25.  If SNF or IPR: Date FOC offered:   Date FOC received:   Accepting facility:     Transportation at discharge: likely BLS  IM/IMM Medicare/Angelo letter given:   1st letter 5/5/25  Caregiver Contact: wife  Discharge Caregiver contacted prior to discharge?   Care Conference needed?   Barriers to discharge:  needs PICC/Midline line orders/placement    CM sent updates to [facility] via [employer].  ANAND Herr, CRM  [phone]

## 2025-05-13 ENCOUNTER — APPOINTMENT (OUTPATIENT)
Facility: HOSPITAL | Age: 75
DRG: 871 | End: 2025-05-13
Payer: MEDICARE

## 2025-05-13 LAB
ALBUMIN SERPL-MCNC: 3.1 G/DL (ref 3.5–5)
ALBUMIN/GLOB SERPL: 0.9 (ref 1.1–2.2)
ALP SERPL-CCNC: 59 U/L (ref 45–117)
ALT SERPL-CCNC: 9 U/L (ref 12–78)
ANION GAP SERPL CALC-SCNC: 7 MMOL/L (ref 2–12)
AST SERPL-CCNC: 29 U/L (ref 15–37)
BACTERIA SPEC CULT: NORMAL
BACTERIA SPEC CULT: NORMAL
BILIRUB SERPL-MCNC: 0.7 MG/DL (ref 0.2–1)
BUN SERPL-MCNC: 9 MG/DL (ref 6–20)
BUN/CREAT SERPL: 15 (ref 12–20)
CALCIUM SERPL-MCNC: 9 MG/DL (ref 8.5–10.1)
CHLORIDE SERPL-SCNC: 106 MMOL/L (ref 97–108)
CO2 SERPL-SCNC: 26 MMOL/L (ref 21–32)
COMMENT:: NORMAL
CREAT SERPL-MCNC: 0.6 MG/DL (ref 0.7–1.3)
GLOBULIN SER CALC-MCNC: 3.6 G/DL (ref 2–4)
GLUCOSE SERPL-MCNC: 103 MG/DL (ref 65–100)
POTASSIUM SERPL-SCNC: 3.7 MMOL/L (ref 3.5–5.1)
PROT SERPL-MCNC: 6.7 G/DL (ref 6.4–8.2)
SERVICE CMNT-IMP: NORMAL
SERVICE CMNT-IMP: NORMAL
SODIUM SERPL-SCNC: 139 MMOL/L (ref 136–145)
SPECIMEN HOLD: NORMAL

## 2025-05-13 PROCEDURE — 74018 RADEX ABDOMEN 1 VIEW: CPT

## 2025-05-13 PROCEDURE — 2500000003 HC RX 250 WO HCPCS: Performed by: INTERNAL MEDICINE

## 2025-05-13 PROCEDURE — 05HC33Z INSERTION OF INFUSION DEVICE INTO LEFT BASILIC VEIN, PERCUTANEOUS APPROACH: ICD-10-PCS | Performed by: HOSPITALIST

## 2025-05-13 PROCEDURE — 6370000000 HC RX 637 (ALT 250 FOR IP): Performed by: NURSE PRACTITIONER

## 2025-05-13 PROCEDURE — 6360000002 HC RX W HCPCS: Performed by: NURSE PRACTITIONER

## 2025-05-13 PROCEDURE — 80053 COMPREHEN METABOLIC PANEL: CPT

## 2025-05-13 PROCEDURE — 92526 ORAL FUNCTION THERAPY: CPT

## 2025-05-13 PROCEDURE — 36410 VNPNXR 3YR/> PHY/QHP DX/THER: CPT

## 2025-05-13 PROCEDURE — 6370000000 HC RX 637 (ALT 250 FOR IP): Performed by: HOSPITALIST

## 2025-05-13 PROCEDURE — B54NZZA ULTRASONOGRAPHY OF LEFT UPPER EXTREMITY VEINS, GUIDANCE: ICD-10-PCS | Performed by: HOSPITALIST

## 2025-05-13 PROCEDURE — 6360000002 HC RX W HCPCS: Performed by: INTERNAL MEDICINE

## 2025-05-13 PROCEDURE — 2060000000 HC ICU INTERMEDIATE R&B

## 2025-05-13 RX ORDER — HYDROXYZINE HYDROCHLORIDE 25 MG/1
25 TABLET, FILM COATED ORAL NIGHTLY
Status: DISCONTINUED | OUTPATIENT
Start: 2025-05-13 | End: 2025-05-14 | Stop reason: HOSPADM

## 2025-05-13 RX ORDER — CLONAZEPAM 0.5 MG/1
0.5 TABLET ORAL ONCE
Status: COMPLETED | OUTPATIENT
Start: 2025-05-13 | End: 2025-05-13

## 2025-05-13 RX ORDER — RAMELTEON 8 MG/1
8 TABLET ORAL NIGHTLY
Status: DISCONTINUED | OUTPATIENT
Start: 2025-05-13 | End: 2025-05-14 | Stop reason: HOSPADM

## 2025-05-13 RX ADMIN — CARBIDOPA AND LEVODOPA 2 TABLET: 25; 100 TABLET ORAL at 06:51

## 2025-05-13 RX ADMIN — CARBIDOPA AND LEVODOPA 2 TABLET: 25; 100 TABLET ORAL at 15:27

## 2025-05-13 RX ADMIN — WATER 2000 MG: 1 INJECTION INTRAMUSCULAR; INTRAVENOUS; SUBCUTANEOUS at 11:07

## 2025-05-13 RX ADMIN — ENTACAPONE 200 MG: 200 TABLET, FILM COATED ORAL at 08:18

## 2025-05-13 RX ADMIN — MIRTAZAPINE 45 MG: 15 TABLET, FILM COATED ORAL at 20:12

## 2025-05-13 RX ADMIN — DIBASIC SODIUM PHOSPHATE, MONOBASIC POTASSIUM PHOSPHATE AND MONOBASIC SODIUM PHOSPHATE 1 TABLET: 852; 155; 130 TABLET ORAL at 08:18

## 2025-05-13 RX ADMIN — HYDROXYZINE HYDROCHLORIDE 25 MG: 25 TABLET, FILM COATED ORAL at 20:13

## 2025-05-13 RX ADMIN — CARBIDOPA AND LEVODOPA 2 TABLET: 25; 100 TABLET ORAL at 19:29

## 2025-05-13 RX ADMIN — ENTACAPONE 200 MG: 200 TABLET, FILM COATED ORAL at 20:12

## 2025-05-13 RX ADMIN — LIOTHYRONINE SODIUM 5 MCG: 5 TABLET ORAL at 20:13

## 2025-05-13 RX ADMIN — LIOTHYRONINE SODIUM 5 MCG: 5 TABLET ORAL at 08:18

## 2025-05-13 RX ADMIN — RAMELTEON 8 MG: 8 TABLET ORAL at 21:51

## 2025-05-13 RX ADMIN — HYDRALAZINE HYDROCHLORIDE 10 MG: 20 INJECTION INTRAMUSCULAR; INTRAVENOUS at 08:37

## 2025-05-13 RX ADMIN — CARBIDOPA AND LEVODOPA 2 TABLET: 25; 100 TABLET ORAL at 08:19

## 2025-05-13 RX ADMIN — ENTACAPONE 200 MG: 200 TABLET, FILM COATED ORAL at 19:29

## 2025-05-13 RX ADMIN — ACETAMINOPHEN 1000 MG: 500 TABLET ORAL at 06:50

## 2025-05-13 RX ADMIN — LEVOTHYROXINE SODIUM 62.5 MCG: 0.12 TABLET ORAL at 06:50

## 2025-05-13 RX ADMIN — PAROXETINE HYDROCHLORIDE 30 MG: 20 TABLET, FILM COATED ORAL at 20:13

## 2025-05-13 RX ADMIN — ENTACAPONE 200 MG: 200 TABLET, FILM COATED ORAL at 11:07

## 2025-05-13 RX ADMIN — Medication: at 08:19

## 2025-05-13 RX ADMIN — ENTACAPONE 200 MG: 200 TABLET, FILM COATED ORAL at 03:57

## 2025-05-13 RX ADMIN — ENTACAPONE 200 MG: 200 TABLET, FILM COATED ORAL at 17:10

## 2025-05-13 RX ADMIN — ENTACAPONE 200 MG: 200 TABLET, FILM COATED ORAL at 15:27

## 2025-05-13 RX ADMIN — CARBIDOPA AND LEVODOPA 2 TABLET: 25; 100 TABLET ORAL at 20:13

## 2025-05-13 RX ADMIN — Medication: at 20:13

## 2025-05-13 RX ADMIN — Medication 3 MG: at 20:13

## 2025-05-13 RX ADMIN — CARBIDOPA AND LEVODOPA 2 TABLET: 25; 100 TABLET ORAL at 03:57

## 2025-05-13 RX ADMIN — HYDRALAZINE HYDROCHLORIDE 10 MG: 20 INJECTION INTRAMUSCULAR; INTRAVENOUS at 04:03

## 2025-05-13 RX ADMIN — CARBIDOPA AND LEVODOPA 2 TABLET: 25; 100 TABLET ORAL at 11:07

## 2025-05-13 RX ADMIN — SODIUM CHLORIDE, PRESERVATIVE FREE 10 ML: 5 INJECTION INTRAVENOUS at 20:14

## 2025-05-13 RX ADMIN — SODIUM CHLORIDE, PRESERVATIVE FREE 10 ML: 5 INJECTION INTRAVENOUS at 08:19

## 2025-05-13 RX ADMIN — ACETAMINOPHEN 1000 MG: 500 TABLET ORAL at 15:27

## 2025-05-13 RX ADMIN — ENTACAPONE 200 MG: 200 TABLET, FILM COATED ORAL at 12:48

## 2025-05-13 RX ADMIN — DIBASIC SODIUM PHOSPHATE, MONOBASIC POTASSIUM PHOSPHATE AND MONOBASIC SODIUM PHOSPHATE 1 TABLET: 852; 155; 130 TABLET ORAL at 20:16

## 2025-05-13 RX ADMIN — ACETAMINOPHEN 1000 MG: 500 TABLET ORAL at 21:51

## 2025-05-13 RX ADMIN — CLONAZEPAM 0.5 MG: 0.5 TABLET ORAL at 22:08

## 2025-05-13 RX ADMIN — ENTACAPONE 200 MG: 200 TABLET, FILM COATED ORAL at 06:50

## 2025-05-13 RX ADMIN — CARBIDOPA AND LEVODOPA 2 TABLET: 25; 100 TABLET ORAL at 12:49

## 2025-05-13 RX ADMIN — CARBIDOPA AND LEVODOPA 2 TABLET: 25; 100 TABLET ORAL at 17:10

## 2025-05-13 NOTE — PROCEDURES
Midline Insertion Procedure Note    Procedure: Insertion of #4 FR/18G Midline    Indications:  Home IV therapy    Procedure Details    Risks of hemorrhage, and adverse drug reaction were discussed.  Vessel assessment revealed compressible well defined vessels.  Multiple sticks noted to BUE prior to PICC RN arrival. UE Midline placed without complication for patient.  2ml of Lidocaine 1% administered via infiltration prior to Midline insertion.  Time-Out performed at bedside with KATYA duron.      #4 FR/18G Midline inserted to the L Basilic vein per hospital protocol.   Blood return:  yes    Catheter length 12 cm, with 0 cm exposed. Mid upper arm circumference is 27 cm.   Catheter was flushed with 20 cc NS. Patient did tolerate procedure well. Upon completion of procedure, all MIDLINE kit components accounted for and intact.  Post Procedure hand-off given to KATYA duron.      Midline Brochure given to patient with teaching instruction. Bed returned to locked position with call bell in reach. PROCEDURE NOTE  Date: 5/13/2025   Name: Fan Vang  YOB: 1950    Procedures

## 2025-05-14 VITALS
DIASTOLIC BLOOD PRESSURE: 74 MMHG | RESPIRATION RATE: 24 BRPM | HEIGHT: 70 IN | OXYGEN SATURATION: 94 % | HEART RATE: 57 BPM | SYSTOLIC BLOOD PRESSURE: 128 MMHG | TEMPERATURE: 97.1 F | BODY MASS INDEX: 19.47 KG/M2 | WEIGHT: 136.02 LBS

## 2025-05-14 PROBLEM — E43 SEVERE PROTEIN-CALORIE MALNUTRITION: Status: ACTIVE | Noted: 2025-05-14

## 2025-05-14 PROCEDURE — 2500000003 HC RX 250 WO HCPCS: Performed by: INTERNAL MEDICINE

## 2025-05-14 PROCEDURE — 6370000000 HC RX 637 (ALT 250 FOR IP): Performed by: NURSE PRACTITIONER

## 2025-05-14 PROCEDURE — 6370000000 HC RX 637 (ALT 250 FOR IP): Performed by: HOSPITALIST

## 2025-05-14 PROCEDURE — 6360000002 HC RX W HCPCS: Performed by: INTERNAL MEDICINE

## 2025-05-14 RX ORDER — CEFTRIAXONE 500 MG/1
2000 INJECTION, POWDER, FOR SOLUTION INTRAMUSCULAR; INTRAVENOUS EVERY 24 HOURS
Qty: 12 EACH | Refills: 0 | Status: SHIPPED
Start: 2025-05-14 | End: 2025-05-17

## 2025-05-14 RX ADMIN — DIBASIC SODIUM PHOSPHATE, MONOBASIC POTASSIUM PHOSPHATE AND MONOBASIC SODIUM PHOSPHATE 1 TABLET: 852; 155; 130 TABLET ORAL at 08:58

## 2025-05-14 RX ADMIN — CARBIDOPA AND LEVODOPA 2 TABLET: 25; 100 TABLET ORAL at 08:57

## 2025-05-14 RX ADMIN — ACETAMINOPHEN 1000 MG: 500 TABLET ORAL at 07:24

## 2025-05-14 RX ADMIN — LEVOTHYROXINE SODIUM 62.5 MCG: 0.12 TABLET ORAL at 07:24

## 2025-05-14 RX ADMIN — SODIUM CHLORIDE, PRESERVATIVE FREE 10 ML: 5 INJECTION INTRAVENOUS at 08:58

## 2025-05-14 RX ADMIN — CARBIDOPA AND LEVODOPA 2 TABLET: 25; 100 TABLET ORAL at 14:14

## 2025-05-14 RX ADMIN — ENTACAPONE 200 MG: 200 TABLET, FILM COATED ORAL at 15:26

## 2025-05-14 RX ADMIN — ENTACAPONE 200 MG: 200 TABLET, FILM COATED ORAL at 08:57

## 2025-05-14 RX ADMIN — CARBIDOPA AND LEVODOPA 2 TABLET: 25; 100 TABLET ORAL at 11:43

## 2025-05-14 RX ADMIN — CARBIDOPA AND LEVODOPA 2 TABLET: 25; 100 TABLET ORAL at 15:26

## 2025-05-14 RX ADMIN — ENTACAPONE 200 MG: 200 TABLET, FILM COATED ORAL at 14:14

## 2025-05-14 RX ADMIN — Medication: at 08:58

## 2025-05-14 RX ADMIN — ENTACAPONE 200 MG: 200 TABLET, FILM COATED ORAL at 07:24

## 2025-05-14 RX ADMIN — LIOTHYRONINE SODIUM 5 MCG: 5 TABLET ORAL at 08:58

## 2025-05-14 RX ADMIN — WATER 2000 MG: 1 INJECTION INTRAMUSCULAR; INTRAVENOUS; SUBCUTANEOUS at 11:43

## 2025-05-14 RX ADMIN — ACETAMINOPHEN 1000 MG: 500 TABLET ORAL at 14:14

## 2025-05-14 RX ADMIN — ENTACAPONE 200 MG: 200 TABLET, FILM COATED ORAL at 03:57

## 2025-05-14 RX ADMIN — ENTACAPONE 200 MG: 200 TABLET, FILM COATED ORAL at 11:43

## 2025-05-14 RX ADMIN — CARBIDOPA AND LEVODOPA 2 TABLET: 25; 100 TABLET ORAL at 07:24

## 2025-05-14 RX ADMIN — CARBIDOPA AND LEVODOPA 2 TABLET: 25; 100 TABLET ORAL at 03:57

## 2025-05-14 NOTE — PROGRESS NOTES
Hospitalist Night Cover     Name: Fan Vang  YOB: 1950      Overnight update:        Notified of K+ 3.0  VS: /81, HR 57, RR 17, O2 sat 96%     - Effer-K 40 meq PO x 2 doses     Emily Puga, PADDY   
                                                 Hospitalist Night Cover     Name: Fan Vang  YOB: 1950      Overnight update:        Notified of wheezing and increase WOB  VS: /91, HR 71, RR 20, O2 sat 93% on RA  - Duoneb q 4 PRN  - CXR:  A portable AP radiograph of the chest was obtained at 1953 hours. .  Elevated right hemidiaphragm with right basilar atelectasis.. Cardiomegaly.  The  bones and soft tissues are grossly within normal limits.  - D Dimer: 4.35, pro BNP : 5,216  - CTA chest: unable to complete secondary to patient being uncooperative and agitated. Was given 1 mg IV Ativan    0156: RRT called for hypoxic event while attempting 2nd time for CTA  Noted persistent increased WOB, patient confused and has been requiring  at bedside.  ABG: ph 7.31, pCO2 65.8, pO2 177, HCO3 32.9 on NRM    - transfer to [facility] for BIPAP initiation  - NIPPV 12/8, with RT to tirate setting for O2 >905  - repeat ABG at 0600    0620: Noted K+ 3.1 with AM labs    - give KCL 10meq IV x 4 doses     Critical Care Attestation:  This patient is unstable and critically ill. Due to a high probability of clinically significant, life threatening deterioration, the patient required my highest level of preparedness to intervene emergently and I personally spent this critical care time directly and personally managing the patient. This critical care time included obtaining a history; examining the patient; pulse oximetry; ordering and review of studies; arranging urgent treatment with development of a management plan; evaluation of patient's response to treatment; frequent reassessment; and, discussions with other providers and/or family. This critical care time was performed to assess and manage the high probability of imminent, life-threatening deterioration that could result in multi-organ failure and death. It was exclusive of separately billable procedures, treating other patients, and teaching 
        [facility] Adult  Hospitalist Group                                                                                          Hospitalist Progress Note  Poli Ribeiro MD  Answering service: [phone] OR [phone] from in house phone        Date of Service:  2025  NAME:  Fan Vang  :  1950  MRN:  [id number]       Admission Summary:   Fan Vang is a 74 y.o. male with past medical history significant for Parkinson disease with resultant deep brain stimulator placement, dementia, hypothyroidism, hypertension, thyroid cancer with resultant partial thyroidectomy presented at the emergency room with change in mental status.  Patient symptoms started yesterday.  It was reported at the facility that the patient is more lethargic than usual.  Patient is unable to provide good history because of his underlying dementia and the acuity of his condition.  He was last admitted to this hospital from 2024 to 2024, was admitted for evaluation and treatment of left greater trochanter avulsion secondary to fall.  CTA of the chest done in the emergency room was negative for PE but did show rib fracture of right 10th rib.  He presented with temperature 100.7, leukocytosis and became hypotensive in the emergency room raising concern for sepsis.  Sepsis protocol was initiated and patient was referred to the Hospitalist service for admission.       Interval history / Subjective:      Patient more alert  On room air      Assessment & Plan:     Severe sepsis/septic shock w/ Hypotension (improved)  E. coli Bacteremia  Pyelonephritis secondary to Acute cystitis w/ Hematuria  - Patient came hypotensive, tachycardic, febrile, with significant leukocytosis, and altered mental status;  - Procalcitonin 37  - Blood culture  bottles E coli  - repeat blood culture  no growth so far  - Voiding trial done   - Empirically started vancomycin/Zosyn, now switched on Ceftriaxone/Metronidazole  - Will 
        [facility] Adult  Hospitalist Group                                                                                          Hospitalist Progress Note  Poli Ribeiro MD  Answering service: [phone] OR [phone] from in house phone        Date of Service:  2025  NAME:  Fan Vang  :  1950  MRN:  [id number]       Admission Summary:   Fan Vang is a 74 y.o. male with past medical history significant for Parkinson disease with resultant deep brain stimulator placement, dementia, hypothyroidism, hypertension, thyroid cancer with resultant partial thyroidectomy presented at the emergency room with change in mental status.  Patient symptoms started yesterday.  It was reported at the facility that the patient is more lethargic than usual.  Patient is unable to provide good history because of his underlying dementia and the acuity of his condition.  He was last admitted to this hospital from 2024 to 2024, was admitted for evaluation and treatment of left greater trochanter avulsion secondary to fall.  CTA of the chest done in the emergency room was negative for PE but did show rib fracture of right 10th rib.  He presented with temperature 100.7, leukocytosis and became hypotensive in the emergency room raising concern for sepsis.  Sepsis protocol was initiated and patient was referred to the Hospitalist service for admission.       Interval history / Subjective:   Follow up severe sepsis   Looks much better than yesterday  Wife feeding him, occasional cough while feeding (baseline)  Wife wants to talk to CM (CM informed)  No labs today  Assessment & Plan:     Severe sepsis/septic shock;  Acute cystitis  Hematuria  Hypotension  -Patient came hypotensive, tachycardic, febrile, with significant leukocytosis, and altered mental status;  -Procalcitonin 37  -Blood culture  bottles E coli  -repeat blood culture  no growth so far  Voiding trial done   -Empirically started 
        [facility] Adult  Hospitalist Group                                                                                          Hospitalist Progress Note  Gary Cha MD  Answering service: [phone] OR [phone] from in house phone        Date of Service:  2025  NAME:  Fan Vang  :  1950  MRN:  [id number]       Admission Summary:   Fan Vang is a 74 y.o. male with past medical history significant for Parkinson disease with resultant deep brain stimulator placement, dementia, hypothyroidism, hypertension, thyroid cancer with resultant partial thyroidectomy presented at the emergency room with change in mental status.  Patient symptoms started yesterday.  It was reported at the facility that the patient is more lethargic than usual.  Patient is unable to provide good history because of his underlying dementia and the acuity of his condition.  He was last admitted to this hospital from 2024 to 2024, was admitted for evaluation and treatment of left greater trochanter avulsion secondary to fall.  CTA of the chest done in the emergency room was negative for PE but did show rib fracture of right 10th rib.  He presented with temperature 100.7, leukocytosis and became hypotensive in the emergency room raising concern for sepsis.  Sepsis protocol was initiated and patient was referred to the Hospitalist service for admission.       Interval history / Subjective:      Patient more alert  Off BiPAP      Assessment & Plan:     Severe sepsis/septic shock w/ Hypotension (improved)  E. coli Bacteremia  Pyelonephritis secondary to Acute cystitis w/ Hematuria  - Patient came hypotensive, tachycardic, febrile, with significant leukocytosis, and altered mental status;  - Procalcitonin 37  - Blood culture  bottles E coli  - repeat blood culture  no growth so far  - Voiding trial done   - Empirically started vancomycin/Zosyn, now switched on Ceftriaxone/Metronidazole  - Will 
        [facility] Adult  Hospitalist Group                                                                                          Hospitalist Progress Note  Sukhwinder Mathis MD  Answering service: [phone] OR [phone] from in house phone        Date of Service:  2025  NAME:  Fan Vang  :  1950  MRN:  [id number]       Admission Summary:   Fan Vang is a 74 y.o. male with past medical history significant for Parkinson disease with resultant deep brain stimulator placement, dementia, hypothyroidism, hypertension, thyroid cancer with resultant partial thyroidectomy presented at the emergency room with change in mental status.  Patient symptoms started yesterday.  It was reported at the facility that the patient is more lethargic than usual.  Patient is unable to provide good history because of his underlying dementia and the acuity of his condition.  He was last admitted to this hospital from 2024 to 2024, was admitted for evaluation and treatment of left greater trochanter avulsion secondary to fall.  CTA of the chest done in the emergency room was negative for PE but did show rib fracture of right 10th rib.  He presented with temperature 100.7, leukocytosis and became hypotensive in the emergency room raising concern for sepsis.  Sepsis protocol was initiated and patient was referred to the Hospitalist service for admission.       Interval history / Subjective:   Patient seen and examined this morning, discussed with wife.  Patient was doing push-ups on .  This is a significant change in his condition.  He remains barely responsive.   NG tube ordered for medications and tube feeds, nursing staff unable to insert, radiology consulted.     Assessment & Plan:          Severe sepsis/septic shock;  Acute cystitis;  Hematuria;  -Patient came hypotensive, tachycardic, febrile, with significant leukocytosis, and altered mental status;  -Procalcitonin 37;   -Empirically started 
        [facility] Adult  Hospitalist Group                                                                                          Hospitalist Progress Note  Poli Ribeiro MD  Answering service: [phone] OR [phone] from in house phone        Date of Service:  2025  NAME:  Fan Vang  :  1950  MRN:  [id number]       Admission Summary:   Fan Vang is a 74 y.o. male with past medical history significant for Parkinson disease with resultant deep brain stimulator placement, dementia, hypothyroidism, hypertension, thyroid cancer with resultant partial thyroidectomy presented at the emergency room with change in mental status.  Patient symptoms started yesterday.  It was reported at the facility that the patient is more lethargic than usual.  Patient is unable to provide good history because of his underlying dementia and the acuity of his condition.  He was last admitted to this hospital from 2024 to 2024, was admitted for evaluation and treatment of left greater trochanter avulsion secondary to fall.  CTA of the chest done in the emergency room was negative for PE but did show rib fracture of right 10th rib.  He presented with temperature 100.7, leukocytosis and became hypotensive in the emergency room raising concern for sepsis.  Sepsis protocol was initiated and patient was referred to the Hospitalist service for admission.       Interval history / Subjective:   Patient seen and examined this morning, discussed with wife.  Patient was doing push-ups on .  This is a significant change in his condition.  He remains barely responsive.   NG tube ordered for medications and tube feeds, nursing staff unable to insert, radiology consulted.     Assessment & Plan:     Severe sepsis/septic shock;  Acute cystitis  Hematuria  Hypotension  -Patient came hypotensive, tachycardic, febrile, with significant leukocytosis, and altered mental status;  -Procalcitonin 37  -Blood culture  
        [facility] Adult  Hospitalist Group                                                                                          Hospitalist Progress Note  Poli Ribeiro MD  Answering service: [phone] OR [phone] from in house phone        Date of Service:  2025  NAME:  Fan Vang  :  1950  MRN:  [id number]       Admission Summary:   Fna Vang is a 74 y.o. male with past medical history significant for Parkinson disease with resultant deep brain stimulator placement, dementia, hypothyroidism, hypertension, thyroid cancer with resultant partial thyroidectomy presented at the emergency room with change in mental status.  Patient symptoms started yesterday.  It was reported at the facility that the patient is more lethargic than usual.  Patient is unable to provide good history because of his underlying dementia and the acuity of his condition.  He was last admitted to this hospital from 2024 to 2024, was admitted for evaluation and treatment of left greater trochanter avulsion secondary to fall.  CTA of the chest done in the emergency room was negative for PE but did show rib fracture of right 10th rib.  He presented with temperature 100.7, leukocytosis and became hypotensive in the emergency room raising concern for sepsis.  Sepsis protocol was initiated and patient was referred to the Hospitalist service for admission.       Interval history / Subjective:      Patient more alert  On room air  Overnight was agitated and sitter put on  Wife was upset earlier as she was not expecting a discharge today (had told her last Friday)  Then she mentioned that she was hoping that the patient could go somewhere else other than [facility] because they don't have medicare bed  Assessment & Plan:     Severe sepsis/septic shock w/ Hypotension (improved)  E. coli Bacteremia  Pyelonephritis secondary to Acute cystitis w/ Hematuria  - Patient came hypotensive, tachycardic, febrile, with 
        [facility] Adult  Hospitalist Group                                                                                          Hospitalist Progress Note  SOFYA Boyd NP  Answering service: [phone] OR [phone] from in house phone        Date of Service:  5/10/2025  NAME:  Fan Vang  :  1950  MRN:  [id number]       Admission Summary:   Fan Vang is a 74 y.o. male with past medical history significant for Parkinson disease with resultant deep brain stimulator placement, dementia, hypothyroidism, hypertension, thyroid cancer with resultant partial thyroidectomy presented at the emergency room with change in mental status.  Patient symptoms started yesterday.  It was reported at the facility that the patient is more lethargic than usual.  Patient is unable to provide good history because of his underlying dementia and the acuity of his condition.  He was last admitted to this hospital from 2024 to 2024, was admitted for evaluation and treatment of left greater trochanter avulsion secondary to fall.  CTA of the chest done in the emergency room was negative for PE but did show rib fracture of right 10th rib.  He presented with temperature 100.7, leukocytosis and became hypotensive in the emergency room raising concern for sepsis.  Sepsis protocol was initiated and patient was referred to the Hospitalist service for admission.       Interval history / Subjective:      K 3.0 & Phos 2.4 replete  C/w Ceftriaxone/flagyl  SNF on Monday?  Easily arouseable, nonsensical speech in setting of Parkinson's and Dementia    Addendum: 1300 Met with wife at bedside, tearful, emotional support provided. Scheduled tylenol added    Assessment & Plan:     Severe sepsis/septic shock w/ Hypotension (improved)  E. coli Bacteremia  Pyelonephritis secondary to Acute cystitis w/ Hematuria  - Patient came hypotensive, tachycardic, febrile, with significant leukocytosis, and altered mental 
        [facility] Adult  Hospitalist Group                                                                                          Hospitalist Progress Note  Poli Ribeiro MD  Answering service: [phone] OR [phone] from in house phone        Date of Service:  2025  NAME:  Fan Vang  :  1950  MRN:  [id number]       Admission Summary:   Fan Vang is a 74 y.o. male with past medical history significant for Parkinson disease with resultant deep brain stimulator placement, dementia, hypothyroidism, hypertension, thyroid cancer with resultant partial thyroidectomy presented at the emergency room with change in mental status.  Patient symptoms started yesterday.  It was reported at the facility that the patient is more lethargic than usual.  Patient is unable to provide good history because of his underlying dementia and the acuity of his condition.  He was last admitted to this hospital from 2024 to 2024, was admitted for evaluation and treatment of left greater trochanter avulsion secondary to fall.  CTA of the chest done in the emergency room was negative for PE but did show rib fracture of right 10th rib.  He presented with temperature 100.7, leukocytosis and became hypotensive in the emergency room raising concern for sepsis.  Sepsis protocol was initiated and patient was referred to the Hospitalist service for admission.       Interval history / Subjective:      Patient more alert  On room air  Overnight was agitated and sitter put on  Wife was upset earlier as she was not expecting a discharge today (had told her last Friday)  Then she mentioned that she was hoping that the patient could go somewhere else other than [facility] because they don't have medicare bed  Assessment & Plan:     Severe sepsis/septic shock w/ Hypotension (improved)  E. coli Bacteremia  Pyelonephritis secondary to Acute cystitis w/ Hematuria  - Patient came hypotensive, tachycardic, febrile, with 
    Patient: Fan Vang MRN: [id number]  SSN: [id number]    YOB: 1950  Age: 74 y.o.  Sex: male        ADMITTED: 2025 to Sukhwinder Mathis MD by Kylee Esteban MD for Sepsis ([facility]) [A41.9]    Fan Vang is confused. Unable to provide history. Urine bright clear yellow.       Vitals: Temp (24hrs), Av.1 °F (38.4 °C), Min:99.2 °F (37.3 °C), Max:103.6 °F (39.8 °C)    Blood pressure 122/67, pulse 77, temperature (!) 100.7 °F (38.2 °C), temperature source Rectal, resp. rate 15, height 1.778 m (5' 10\"), weight 75.6 kg (166 lb 10.7 oz), SpO2 93%.    Intake and Output:  1901 -  0700  In: -   Out: 2800 [Urine:2800]   07 -  190  In: -   Out: 800 [Urine:800]  FATOU Output lats 24 hrs: No data found.   FATOU Output last 8 hrs: No data found.  BM over last 24 hrs: No data found.    Physical Exam  General: NAD, confused   Respiratory: no distress, breathing easily, NC, wet lung sounds   Abdomen: soft, no distention; non-tender to palpation  : 22 F [place] catheter with clear bright yellow UA  Neuro: responds to voice, confused   Skin: warm, dry  Extremities: moves all, full ROM    Labs:  CBC:   Lab Results   Component Value Date/Time    WBC 26.5 2025 05:45 AM    HCT 24.9 2025 05:45 AM     2025 05:45 AM     BMP:   Lab Results   Component Value Date/Time     2025 05:45 AM    K 3.5 2025 05:45 AM     2025 05:45 AM    CO2 27 2025 05:45 AM    BUN <1 2025 05:45 AM      Assessment/Plan:   73 yo M admitted for AMS, sepsis, bacteremia, gross hematuria 2/2 catheter trauma, difficult remy placement 2/2 false passage    Urine now clear yellow    - Maintain remy until patient has improved medically and AMS has resolved, then can attempt voiding trial. Please do not remove the remy on a weekend, and remove early in the AM at the time of removal.   - Send UA, treat as indicated.   - Broad spectrum empiric IV abx per primary team 
0643- Pt's wife Rianna Vang called with update regarding patient status and upgrade/transfer to intermediate level floor.   
1020:  pt woke up long enough to take his medication. Oriented to self and location. Pt reports the scar on top of his head was to reduce/stop the tremors to his hands. Other orientation questionable as pt mumbles and cannot always understand sounds patient made.pt made a thumbs up to acknowledge he likes water with his medication    Abou 1830, noted expiratory wheezing, RT called for treatment.  
1030  Pt currently resting. Visible chest rise and fall.  
2100 - Due medications given to the patient. Patient was informed about the bipap to be placed tonight while he sleeps but refused for that. I told him I'll be before bed time to try again.    2120 - Patient tried to remove all his lines and cords and get out of the bed; reoriented and kept situated back to bed. Side rails up and bed at the lowest height.    2143 - Tried to get out of the bed again with his legs over the side rails. Safety maintained. Checked order for the sitter and it is still active; might use tonight if he still try to get ou of the bed.    2337 - Patient removed the tele cord and again tried to get out of the bed. Informed supervisor that I will let the pct to sit at the bedside as he is very agitated at the moment. Sitter placed. Explained the bipap that should be worn when sleeping but he removed it and refused to put it back.     0400 - Sitter kept for him all night. Prn hydralazine given for his bp; monitored.    0645 - Chg done; changed gown and male wick. Safety maintained with sitter.     0730 - Report given to am nurse for continuity of care.  
2200 pt has been getting more confused over the last hour, constantly trying to get out of bed with no successful redirection from Virtual Sitter or staff. Unable to leave pt room for more than a few minutes. Notified Supervisor to get  for pt.      2240:  arrived, pt cleaned up and straighten in bed.       2330: Pt to go down to CT via transport    0010: CT informed this nurse that they were unsuccessful to get CT due to pt agitation and combativeness. Notified NP    0030: IV Ativan ordered, it was administered by Jennifer ALDANA. Pt placed on 1 L oxygen      0150: While trying to get Pt ready for another attemt at CT pt was still combative. Contacted NP, and found that O2 was in the mid 70s. Turned up pt O2 and called RR.       Pt O2 up to 80s, RR team responded, MORAIMA Quintero, NP Nydia, Supervisor Violetta. Pt is A&Ox0 which has been baseline all night due to parkinsons dementia. Pt placed on non rebreather at 15L.   ABG done, showing CO2 retention and acidosis. Pt to transfer to  for continuous BPAP.    0220 Report called to  Renu ALDANA    0250 Pt transferred to 440 with primary nurse, MORAIMA Quintero and  Maia.    Made sure all questions were answered for receiving nurse      
4 Eyes Skin Assessment     NAME:  Fan Vang  YOB: 1950  MEDICAL RECORD NUMBER:  [id number]    The patient is being assessed for  Transfer to New Unit    I agree that at least one RN has performed a thorough Head to Toe Skin Assessment on the patient. ALL assessment sites listed below have been assessed.      Areas assessed by both nurses:    Head, Face, Ears, Shoulders, Back, Chest, Arms, Elbows, Hands, Sacrum. Buttock, Coccyx, Ischium, Legs. Feet and Heels, and Under Medical Devices         Does the Patient have a Wound? Yes wound(s) were present on assessment. LDA wound assessment was Initiated and completed by RN       Jude Prevention initiated by RN: Yes  Wound Care Orders initiated by RN: Yes    Pressure Injury (Stage 3,4, Unstageable, DTI, NWPT, and Complex wounds) if present, place Wound referral order by RN under : Yes    New Ostomies, if present place, Ostomy referral order under : No     Nurse 1 eSignature: Electronically signed by Raquel Escoto RN on 5/6/25 at 7:14 PM EDT    **SHARE this note so that the co-signing nurse can place an eSignature**    Nurse 2 eSignature: Electronically signed by Magdalena Thomas RN on 5/6/25 at 7:16 PM EDT     
Admission Medication Reconciliation:    Information obtained from:  Transfer papers from [facility] and [facility] (pharmacy that fills the prescriptions for [facility])  RxQuery data available¹:  Yes    Comments/Recommendations: Updated PTA meds/reviewed patient's allergies.    1)  Information obtained from transfer papers from [facility] and clarified with pharmacist at [facility] in [facility] (pharmacy that contracts with them to fill their scripts)    2)  Medication changes (since last review):  Added  - carbidopa/levodopa/entacapone -200 mg 9 times daily @ 0400, 0700, 0900, 1100, 1300, 1500, 1700, 1900, 2100  -diclofenac 75 mg BID  -glucosamine/chondroiton 2 tablets BID  -hydroxyzine 25 mg daily prn anxiety  -senna-S daily prn    Adjusted  - vitamin D to 4000 untis  -levothyroxine to 62.5 mcg daily (50 + 1/2 25 mcg tablet for total of 62.5 mcg daily)  -liothyronine to 5 mcg BID      Removed  - docusate  -amlodipine         ¹[employer] pharmacy benefit data reflects medications filled and processed through the patient's insurance, however   this data does NOT capture whether the medication was picked up or is currently being taken by the patient.    Allergies:  Celecoxib, Quetiapine, and Trazodone    Significant PMH/Disease States:   Past Medical History:   Diagnosis Date    Arthritis     back and left knee    Dependence on walking stick     bilateral/single hiking stick if walking distance    Fractures left greater trochanter, chronic right ribs 4-7    Gout     Osteoarthritis     Parkinson's disease (HCC)     S/P deep brain stimulator placement     pt has a remote for this device    Thyroid cancer (HCC)     radioactive iodine 4/2020, and partial thyroidectomy    Uses brace     back brace     Chief Complaint for this Admission:    Chief Complaint   Patient presents with    Altered Mental Status     Prior to Admission Medications:   Prior to Admission Medications 
Comprehensive Nutrition Assessment    Type and Reason for Visit: Reassess    Nutrition Recommendations/Plan:     Consider rechecking Phos to determine need for ongoing supplemental Phos.  Continue regular diet.  Update/ honor preferences as able.  Do not force feed.  Will continue to protein high kcal/high protien ONS on trays.  Will also trial Magic Cup given his love of ice cream.         Malnutrition Assessment:  Malnutrition Status:  Severe malnutrition (05/14/25 1225)    Context:  Chronic Illness     Findings of the 6 clinical characteristics of malnutrition:  Energy Intake:  75% or less estimated energy requirements for 1 month or longer  Weight Loss:  Greater than 10% over 6 months     Body Fat Loss:  Unable to assess     Muscle Mass Loss:  Severe muscle mass loss Clavicles (pectoralis & deltoids), Hand (interosseous)  Fluid Accumulation:  No fluid accumulation     Strength:  Not Performed         Nutrition Assessment:    Past Medical History:   Diagnosis Date    Arthritis     back and left knee    Dependence on walking stick     bilateral/single hiking stick if walking distance    Fractures left greater trochanter, chronic right ribs 4-7    Gout     Osteoarthritis     Parkinson's disease (HCC)     S/P deep brain stimulator placement     pt has a remote for this device    Thyroid cancer (HCC)     radioactive iodine 4/2020, and partial thyroidectomy    Uses brace     back brace       Pt admitted with Hypoxemia [R09.02]  Gross hematuria [R31.0]  SIRS (systemic inflammatory response syndrome) (HCC) [R65.10]  Sepsis (HCC) [A41.9]  Hypotension, unspecified hypotension type [I95.9]  Altered mental status, unspecified altered mental status type [R41.82]        5/14: pt tx units on 5/11, so I suspect the large drop in weight (159 lb to 138 lb) is r/t to new bed.  Down further in the 130s again today.  There is a good possibility this weight is the more accurate wt and his higher weights on admission were skewed.  
Infectious Disease Progress Note    INFECTIOUS DISEASE Attending:     I agree with the above infectious disease daily progress note in its entirety as authored by and discussed in detail with the nurse practitioner.   I have reviewed pertinent laboratory studies, microbiology cultures, radiologic reports with review of the consultations and progress notes as appropriate.   I agree with today's subjective findings, physical examination, assessment and plan of care as described above and discussed extensively with the nurse practitioner.       Doing much better.  Afebrile, more alert and talkative.    Exam: afebrile, NAD, supple neck, anicteric sclerae, Nl WOB and normal heart rate, much clearer to auscultation    Plan:    Continue Ceftriaxone until 5/17/25, inclusive.    Stopped Flagyl.    See plan of care note - can place midline to complete course.    Will sign off, please call with questions.    A total time of 20 minutes was spent on today's encounter.  Greater than 50% of the time was spent on the following:  Preparing for visit and chart review.  Obtaining and/or reviewing separately obtained history  Performing a medically appropriate exam and/or evaluation  Counseling and educating a patient/family/caregiver as noted above  Placing relevant orders  Referring and communicating with other professionals (not separately reported)  Independently interpreting results (not separately reported) and communicating results to the patient/family/caregiver  Care coordination (not separately reported) as noted above  Documenting clinical information in the electronic health records (e.g. problem list, visit note) on the day of the encounter      Impression/Plan     74 y.o. male with past medical Hx of Parkinson disease with resultant deep brain stimulator placement, dementia, hypothyroidism, thyroid cancer with resultant partial thyroidectomy who presented to the ED with AMS, admitted for sepsis secondary to acute cystitis 
NUTRITION brief    Recommendations:     Replete Phos, K+    Continue soft and bite sized diet - update preferences as needed  Feeding assistance with all meals (a lot of difficulty with utensil especially)  If able to advance diet, may be able to offer to more finger foods which he does better with    Starting high kcal/high protein ONS with meals       Diet: soft and bite sized  Supplements/Nutrition Support: none at this time  Nutrition-related meds: reviewed    Noted inability to place NG tube.  However, pt is more awake/ alert and diet has been advanced by SLP.  Some preferences for yogurt in place.  Will start high kcal/high protein ONS with meals for now as well until PO adequacy can be determined.  Weight continues to drop if accurate (via bed scale).  Low K+ and Phos yesterday.  Repletion ordered today.    See full RD assessment from 5/7 for additional details, goals, and monitoring/evaluation.   Estimated Nutrition Needs:   Energy Requirements Based On: Kcal/kg  Weight Used for Energy Requirements: Admission (75.6 kg)  Energy (kcal/day): 1900 (25 kcal/kg)  Weight Used for Protein Requirements: Admission  Protein (g/day): 90 (1.2 gm/kg)     Fluid (ml/day): 1 mL/kcal    Cathy Avila RD  Available via [employer]    
Occupational Therapy  5/7/25    Chart reviewed in preparation for OT evaluation. Spoke with PT who had discussed with RN prior to session that pt is not following commands at this time and is not appropriate for therapy. OT will continue to follow and see as able and appropriate.     Thank you,  Nadine Chairez, OTR/L  
Patient received a discharge order. The patient was made aware of the discharge and agreed to going home. The patient's IV was removed without any bleeding or complications. Pt midline left in place per provider. The patient was given discharge instructions which included new, changed, and discontinued medication, where they can  their prescriptions, side effects of new medications, opioid safety, follow up appointments, signs and symptoms of heart attack and stroke and when to seek emergency care, and how to access Mychart. The patient's belongings were all packed up and the patient verified they had everything that belongs to them. The patient transported with [facility] vis stretcher to [facility] Vital signs were all stable at the time of discharge.      
Patient refused bipap  
Patient with other service at bedside, x-ray. Will defer and follow up later today as appropriate and able.    Thank you  Saloni Disla MS OTR/L  
Pharmacist Note - Vancomycin Dosing    Consult provided for this 74 y.o. male for indication of sepsis.  Antibiotic regimen(s): Vancomycin and Zosyn  Patient on vancomycin PTA? NO   Pregnancy status: N/A    Recent Labs     25  1655 25  0545   WBC 17.0* 26.5*   CREATININE 0.82  --    BUN 22*  --      Frequency of BMP: daily  Height: 177.8 cm  Weight: 75.6 kg  Est CrCl: ~80-85 ml/min  Temp (24hrs), Av.2 °F (38.4 °C), Min:99.2 °F (37.3 °C), Max:103.6 °F (39.8 °C)    Cultures:  2025:  Blood:  pending    MRSA Swab ordered (if applicable)? YES    The plan below is expected to result in a target range of AUC/PAUL 400-600    Therapy will be initiated with a loading dose of 1750 mg IV x 1 to be followed by a maintenance dose of 1000 mg IV every 12 hours.  Pharmacy to follow patient daily and order levels / make dose adjustments as appropriate.    *Vancomycin has been dosed used Bayesian kinetics software to target an AUC/PAUL of 400-600, which provides adequate exposure for an assumed infection due to MRSA with an PAUL of 1 or less while reducing the risk of nephrotoxicity as seen with traditional trough based dosing goals.     
Physical Therapy:  05/07/25     Orders received and chart reviewed in preparation for PT evaluation. Spoke to RN who reports patient is demonstrating no command following at this time and is not appropriate for PT interventions. Note attending MD note which states, \"condition is critical and prognosis is grim\", and note palliative care consult for GOC discussions. Will defer and continue to follow as medically appropriate/ indicated pending GOC.     Thank you,  Mary Brown, PT, DPT      
Speech pathology note  Reviewed chart and discussed case with RN who reported patient continues with AMS and is not appropriate for swallowing evaluation at this time. RN reported attempts to place NG tube. SLP will hold but continue to follow as medically appropriate. Thank you.    ADELA Deleon Ed., CCC-SLP, CLC   
Spiritual Health History and Assessment/Progress Note  [facility]    Attempted Encounter,  ,  ,      Name: Fan Vang MRN: [id number]    Age: 74 y.o.     Sex: male   Language: English   Synagogue: [employer]   Sepsis (HCC)     Date: 5/6/2025            Total Time Calculated: 10 min              Referral/Consult From: Palliative Care   Encounter Overview/Reason: Attempted Encounter  Service Provided For: Patient not available    Chart review. Patient unavailable to 's attempted visit. No family present. Spiritual care will follow for spiritual assessment and is available upon request.     Patient Plan of Care: Spiritual Care available upon further referral  Family/Friends Plan of Care: No family/friends present    Electronically signed by Chaplain Jennifer Resident on 5/6/2025 at 4:23 PM    
TRANSFER - OUT REPORT:    Verbal report given to Saloni Tatum RN on Fan Vang  being transferred to [facility] for routine progression of patient care       Report consisted of patient's Situation, Background, Assessment and   Recommendations(SBAR).     Information from the following report(s) Nurse Handoff Report, Adult Overview, Intake/Output, MAR, and Event Log was reviewed with the receiving nurse.           Lines:       Opportunity for questions and clarification was provided.             
U/S guided PIV placed in the right forearm without complication for patient with poor vascular access.    Timeout performed at the bedside with KATYA Raygoza.  Post-procedure hand-off given to KATYA Raygoza.    Bed returned to low, locked position with call bell in reach.    
Absolute 0.00 K/UL    Differential Type MANUAL      RBC Comment MACROCYTOSIS  1+       Extra Tubes Hold    Collection Time: 05/09/25  9:00 AM   Result Value Ref Range    Specimen HOld 1GOLD     Comment:        Add-on orders for these samples will be processed based on acceptable specimen integrity and analyte stability, which may vary by analyte.        Microbiology:  blood cx    Studies:  CT Result (most recent):  CT ABDOMEN PELVIS W IV CONTRAST 05/06/2025    Narrative  INDICATION: Sepsis, evaluate for intra-abdominal process    EXAM: CT Abdomen and Pelvis is performed with 100 mL Isovue 370 contrast IV  without oral contrast. CT dose reduction was achieved through use of a  standardized protocol tailored for this examination and automatic exposure  control for dose modulation.    FINDINGS:  There is left renal striated nephrogram with perirenal stranding, with  collecting system and proximal ureter mural enhancement, compatible with  pyelonephritis. There is no hydronephrosis, mass or abscess. Right kidney shows  no hydronephrosis or acute finding. Bladder is empty with catheter in place.    There is no ascites, pneumoperitoneum or significant adenopathy.  Liver shows no significant finding.  Bile ducts are not enlarged.  Pancreas shows no mass or inflammation.  Spleen is unremarkable.  Adrenal glands are normal in size.  Aorta is atherosclerotic without aneurysm.    The bowels show no acute finding.  The bladder is empty with catheter in place.    Impression  Left pyelonephritis. No abscess or hydronephrosis.      Electronically signed by LYNDA GUTIERREZ    EXAM:  CTA CHEST W WO CONTRAST     INDICATION: PE protocol     COMPARISON: CTA chest December 11, 2024     TECHNIQUE: Helical thin section chest CT following intravenous administration of  nonionic contrast 80 mL of isovue 370 according to departmental PE protocol.  Coronal and sagittal reformats were performed. 3D post processing was performed.   CT dose 
clinical/nonclinical/nursing services involved in patient's clinical care. Care coordination discussions were held with appropriate clinical/nonclinical/ nursing providers based on care coordination needs.         Patients current active Medications were reviewed, considered, added and adjusted based on the clinical condition today.      Home Medications were reconciled to the best of my ability given all available resources at the time of admission. Route is PO if not otherwise noted.        Medications Reviewed:     Current Facility-Administered Medications   Medication Dose Route Frequency    PARoxetine (PAXIL) tablet 30 mg  30 mg Oral Nightly    sodium chloride flush 0.9 % injection 5-40 mL  5-40 mL IntraVENous 2 times per day    sodium chloride flush 0.9 % injection 5-40 mL  5-40 mL IntraVENous PRN    0.9 % sodium chloride infusion   IntraVENous PRN    enoxaparin (LOVENOX) injection 40 mg  40 mg SubCUTAneous Daily    acetaminophen (TYLENOL) tablet 650 mg  650 mg Oral Q6H PRN    Or    acetaminophen (TYLENOL) suppository 650 mg  650 mg Rectal Q6H PRN    0.9 % sodium chloride infusion   IntraVENous Continuous    iron sucrose (VENOFER) injection 100 mg  100 mg IntraVENous Q24H    potassium phosphate 30 mmol in sodium chloride 0.9 % 500 mL IVPB  30 mmol IntraVENous Once    [START ON 5/7/2025] levothyroxine (SYNTHROID) tablet 100 mcg  100 mcg Oral Daily    liothyronine (CYTOMEL) tablet 5 mcg  5 mcg Oral BID    carbidopa-levodopa (SINEMET CR)  MG per extended release tablet 1 tablet  1 tablet Oral TID    carbidopa-levodopa (SINEMET)  MG per tablet 2 tablet  2 tablet Oral 9 times per day    And    entacapone (COMTAN) tablet 200 mg  200 mg Oral TID    iopamidol (ISOVUE-370) 76 % injection 100 mL  100 mL IntraVENous ONCE PRN    cefTRIAXone (ROCEPHIN) 2,000 mg in sterile water 20 mL IV syringe  2,000 mg IntraVENous Q24H    lidocaine (XYLOCAINE) 2 % uro-jet   Topical PRN     Current Outpatient Medications 
administration of  nonionic contrast 80 mL of isovue 370 according to departmental PE protocol.  Coronal and sagittal reformats were performed. 3D post processing was performed.   CT dose reduction was achieved through the use of a standardized protocol  tailored for this examination and automatic exposure control for dose  modulation.     FINDINGS: This is a good quality study for the evaluation of pulmonary embolism  to the first subsegmental arterial level. There is no pulmonary embolism to this  level.     Right anterior cardiac device.  MEDIASTINUM: No mass or lymphadenopathy.  VIVIANA: No mass or lymphadenopathy.  THORACIC AORTA: Fusiform ectasia of the ascending aorta measuring 5.0 cm.  HEART: Normal in size.  ESOPHAGUS: No wall thickening or dilatation.  TRACHEA/BRONCHI: Patent.  PLEURA: No effusion or pneumothorax.  LUNGS: Chronic right lower lobe atelectasis.  UPPER ABDOMEN: Cholelithiasis.  BONES: Subacute appearing fracture of the right 10th rib is new since December 11, 2024.     IMPRESSION:     1. No findings of an acute or chronic pulmonary thromboembolism.  2. Subacute appearing fracture of the right 10th rib is new since December 11, 2024.  3. Fusiform ectasia of the ascending aorta.        Electronically signed by Mitch Garzon    Thank you for the opportunity to participate in the care of this patient.     A total time of 20 minutes was spent on today's encounter.  Greater than 50% of the time was spent on the following:  Preparing for visit and chart review.  Obtaining and/or reviewing separately obtained history  Performing a medically appropriate exam and/or evaluation  Counseling and educating a patient/family/caregiver as noted above  Placing relevant orders  Referring and communicating with other professionals (not separately reported)  Independently interpreting results (not separately reported) and communicating results to the patient/family/caregiver  Care coordination (not separately

## 2025-05-14 NOTE — WOUND CARE
WOCN Note:     New consult placed for \"sacrum\"    Assessed in 414/01  with PCT..    Fan Vang is a 74 y.o. y/o male who presented for Hypoxemia [R09.02]  Gross hematuria [R31.0]  SIRS (systemic inflammatory response syndrome) (HCC) [R65.10]  Sepsis (HCC) [A41.9]  Hypotension, unspecified hypotension type [I95.9]  Altered mental status, unspecified altered mental status type [R41.82]  Admitted on 5/5/2025; LOS: 2    Lab Results   Component Value Date/Time    WBC 26.5 (H) 05/06/2025 05:45 AM    LABA1C <3.8 (L) 12/09/2024 05:33 AM    POCGLU 116 12/11/2024 11:37 AM    HGB 8.5 (L) 05/06/2025 05:45 AM    HCT 24.9 (L) 05/06/2025 05:45 AM     05/06/2025 05:45 AM        Lab Results   Component Value Date/Time    WBC 26.5 (H) 05/06/2025 05:45 AM        Tobacco Use      Smoking status: Former        Types: Cigarettes      Smokeless tobacco: Never     No diet orders on file     Assessment:   Patient is non responsive and requires assist with repositioning.    Bed: abhilash gel  GI/: remy  Restraints: wrist  Patient repositioned on right side with pillow.  Heels offloaded with pillows.  Applied Right heel transparent dressing applied.  Heels intact without erythema.          Wound Assessment  POA sacrum, deep tissue pressure injury : 8 x 6 x 0 cm; non blanching maroon.  TX: foam dressing      2. Generalized blue bruising          Wound, Pressure Prevention & Skin Care Recommendations:    Minimize layers of linen/pads under patient to optimize support surface.    2.  Turn/reposition approximately every 2 hours and offload heels.   3.  Manage moisture/ Keep skin folds clean and dry/minimize brief usage.  4.  Specialty bed: immerse low air loss ordered. Confirmation [id number]. Use only flat sheet and one incontinence pad.  5.  Sacrum:  Venelex BID and cover with foam dressing.    Discussed above plan with patient & Marcelo ALDANA    Transition of Care:   Plan to follow as needed while admitted to hospital.    Tameka Cronin, 
admitted to hospital.    MATT VelazquezN RN [facility] Inpatient Wound Care  Available on [facility]  Office [phone]

## 2025-05-14 NOTE — CARE COORDINATION
Brief Note:  Follow-up calls placed to admissions [facility] ([facility] [phone]).  Message left on  re d/c today and transport has been arranged  with [facility] for 4pm transport.   ANAND Almanza, CRM  [phone]

## 2025-05-14 NOTE — PLAN OF CARE
Problem: Occupational Therapy - Adult  Goal: By Discharge: Performs self-care activities at highest level of function for planned discharge setting.  See evaluation for individualized goals.  Description: FUNCTIONAL STATUS PRIOR TO ADMISSION:  The patient was functional at the wheelchair level and required minimal assistance for transfers to the chair. Lives in [facility] and required some assistance with ADLs but was able to participate in care  HOME SUPPORT: Pt lived at [facility]    Occupational Therapy Goals:  Initiated 5/8/2025  1.  Patient will perform self-feeding with Moderate Assist within 7 day(s).  2.  Patient will perform grooming with Moderate Assist within 7 day(s).  3.  Patient will perform upper body dressing with Moderate Assist within 7 day(s).  4.  Patient will perform toilet transfers with Maximal Assist  within 7 day(s).  5.  Patient will perform all aspects of toileting with Maximal Assist within 7 day(s).    Outcome: Progressing     OCCUPATIONAL THERAPY EVALUATION    Patient: Fan Vang (74 y.o. male)  Date: 5/8/2025  Primary Diagnosis: Hypoxemia [R09.02]  Gross hematuria [R31.0]  SIRS (systemic inflammatory response syndrome) (HCC) [R65.10]  Sepsis (HCC) [A41.9]  Hypotension, unspecified hypotension type [I95.9]  Altered mental status, unspecified altered mental status type [R41.82]         Precautions: Fall Risk                  ASSESSMENT :  The patient is limited by decreased functional mobility, independence in ADLs, high-level IADLs, ROM, strength, body mechanics, activity tolerance, endurance, safety awareness, cognition, command following, attention/concentration, coordination, balance, proprioception, vision/visual deficit, posture, fine-motor control.    Based on the impairments listed above pt would benefit from acute OT services to increase overall independence with ADLs. Pt with hx of Parkinsons s/p DBS, dementia, and GLF with L hip fracture in December 2024. Pt 
  Problem: Occupational Therapy - Adult  Goal: By Discharge: Performs self-care activities at highest level of function for planned discharge setting.  See evaluation for individualized goals.  Description: FUNCTIONAL STATUS PRIOR TO ADMISSION:  The patient was functional at the wheelchair level and required minimal assistance for transfers to the chair. Lives in [facility] and required some assistance with ADLs but was able to participate in care  HOME SUPPORT: Pt lived at [facility]    Occupational Therapy Goals:  Initiated 5/8/2025  1.  Patient will perform self-feeding with Moderate Assist within 7 day(s).  2.  Patient will perform grooming with Moderate Assist within 7 day(s).  3.  Patient will perform upper body dressing with Moderate Assist within 7 day(s).  4.  Patient will perform toilet transfers with Maximal Assist  within 7 day(s).  5.  Patient will perform all aspects of toileting with Maximal Assist within 7 day(s).    Outcome: Progressing   OCCUPATIONAL THERAPY TREATMENT  Patient: Fan Vang (74 y.o. male)  Date: 5/12/2025  Primary Diagnosis: Hypoxemia [R09.02]  Gross hematuria [R31.0]  SIRS (systemic inflammatory response syndrome) (HCC) [R65.10]  Sepsis (HCC) [A41.9]  Hypotension, unspecified hypotension type [I95.9]  Altered mental status, unspecified altered mental status type [R41.82]       Precautions: Fall Risk                Chart, occupational therapy assessment, plan of care, and goals were reviewed.    ASSESSMENT  Patient continues to benefit from skilled OT services and is progressing towards goals. Pt received semi-reclined in bed, agreeable to therapy, cleared by RN. Pt was Min A for bed mobility and scooting to EOB, with fair intact sitting balance. Pt was Min-Mod x2 for sit<>stand and lateral steps to HOB.  Pt fatigued quickly during session. Pt was left semi-reclined in bed, rn at bedside as pt had BM. Pt was left semi-reclined in bed, vss, all  needs met, call bell in 
  Problem: Physical Therapy - Adult  Goal: By Discharge: Performs mobility at highest level of function for planned discharge setting.  See evaluation for individualized goals.  Description: FUNCTIONAL STATUS PRIOR TO ADMISSION: The patient was functional at the wheelchair level and required minimal assistancefor transfers to the chair. Per CM, wife is reporting he was having increased difficulty navigating the W/C himself recently. Lives in memory-care [facility].     HOME SUPPORT PRIOR TO ADMISSION: The patient lived alone with [facility] staff to provide assistance.    Physical Therapy Goals  Initiated 5/8/2025  1.  Patient will move from supine to sit and sit to supine in bed with moderate assistance within 7 day(s).    2.  Patient will sit supported at EOB with minimal assistance for 10 minutes within 7 day(s).   3.  Patient will perform sit to stand with moderate assistance within 7 day(s).  4.  Patient will transfer from bed to chair and chair to bed with moderate assistance using the least restrictive device within 7 day(s).    Outcome: Progressing   PHYSICAL THERAPY EVALUATION    Patient: Fan Vang (74 y.o. male)  Date: 5/8/2025  Primary Diagnosis: Hypoxemia [R09.02]  Gross hematuria [R31.0]  SIRS (systemic inflammatory response syndrome) (HCC) [R65.10]  Sepsis (HCC) [A41.9]  Hypotension, unspecified hypotension type [I95.9]  Altered mental status, unspecified altered mental status type [R41.82]       Precautions: Restrictions/Precautions  Restrictions/Precautions: Fall Risk  Implants Present? : Deep brain stimulator            ASSESSMENT :   DEFICITS/IMPAIRMENTS:   The patient is limited by decreased functional mobility, independence in ADLs, ROM, strength, activity tolerance, safety awareness, cognition, command following, attention/concentration, coordination, balance, and increased risk for falls in setting of hospital admission for severe septic shock/ SIRS with hypotension and hypoxemia. Hx 
  Problem: Physical Therapy - Adult  Goal: By Discharge: Performs mobility at highest level of function for planned discharge setting.  See evaluation for individualized goals.  Description: FUNCTIONAL STATUS PRIOR TO ADMISSION: The patient was functional at the wheelchair level and required minimal assistancefor transfers to the chair. Per CM, wife is reporting he was having increased difficulty navigating the W/C himself recently. Lives in memory-care [facility].     HOME SUPPORT PRIOR TO ADMISSION: The patient lived alone with [facility] staff to provide assistance.    Physical Therapy Goals  Initiated 5/8/2025  1.  Patient will move from supine to sit and sit to supine in bed with moderate assistance within 7 day(s).    2.  Patient will sit supported at EOB with minimal assistance for 10 minutes within 7 day(s).   3.  Patient will perform sit to stand with moderate assistance within 7 day(s).  4.  Patient will transfer from bed to chair and chair to bed with moderate assistance using the least restrictive device within 7 day(s).    Outcome: Progressing     PHYSICAL THERAPY TREATMENT    Patient: Fan Vang (74 y.o. male)  Date: 5/12/2025  Diagnosis: Hypoxemia [R09.02]  Gross hematuria [R31.0]  SIRS (systemic inflammatory response syndrome) (HCC) [R65.10]  Sepsis (HCC) [A41.9]  Hypotension, unspecified hypotension type [I95.9]  Altered mental status, unspecified altered mental status type [R41.82] Severe sepsis (HCC)      Precautions: Restrictions/Precautions  Restrictions/Precautions: Fall Risk  Implants Present? : Deep brain stimulator          ASSESSMENT:  Received pt in bed on RA, SpO2 90's.  Pt initially declined participation despite wanting to get OOB w/ RN just prior to session.  Pt later agreed to participate.   He completed bed mobility w/ min A x1, stood and ambulated a few side steps with the RW w/ min A x2.  He was not able to complete bed to chair transfer d/t bowel incontinence.  Assisted back to 
  Problem: SLP Adult - Impaired Swallowing  Goal: By Discharge: Advance to least restrictive diet without signs or symptoms of aspiration for planned discharge setting.  See evaluation for individualized goals.  Description: Speech Therapy Goals  Initiated 5/6/2025  1. Patient will participate in ongoing assessment of swallow at bedside within 7 days.   2. Patient will initiate oral diet within 7 days. MET 5/8/2025.   Initiated 5/8/2025  3. Patient will tolerate least restrictive diet without adverse effects within 7 days.   5/12/2025 1149 by Antoinette Newman, SLP  Outcome: Progressing     Problem: Safety - Adult  Goal: Free from fall injury  Outcome: Progressing     Problem: Skin/Tissue Integrity  Goal: Skin integrity remains intact  Description: 1.  Monitor for areas of redness and/or skin breakdown2.  Assess vascular access sites hourly3.  Every 4-6 hours minimum:  Change oxygen saturation probe site4.  Every 4-6 hours:  If on nasal continuous positive airway pressure, respiratory therapy assess nares and determine need for appliance change or resting period  Outcome: Progressing     Problem: Nutrition Deficit:  Goal: Optimize nutritional status  Outcome: Progressing     Problem: Physical Therapy - Adult  Goal: By Discharge: Performs mobility at highest level of function for planned discharge setting.  See evaluation for individualized goals.  Description: FUNCTIONAL STATUS PRIOR TO ADMISSION: The patient was functional at the wheelchair level and required minimal assistancefor transfers to the chair. Per CM, wife is reporting he was having increased difficulty navigating the W/C himself recently. Lives in memory-care [facility].     HOME SUPPORT PRIOR TO ADMISSION: The patient lived alone with [facility] staff to provide assistance.    Physical Therapy Goals  Initiated 5/8/2025  1.  Patient will move from supine to sit and sit to supine in bed with moderate assistance within 7 day(s).    2.  Patient will sit 
  Problem: Safety - Adult  Goal: Free from fall injury  5/12/2025 2200 by Marshal Mann RN  Outcome: Progressing  Flowsheets (Taken 5/12/2025 2200)  Free From Fall Injury: Instruct family/caregiver on patient safety     Problem: Discharge Planning  Goal: Discharge to home or other facility with appropriate resources  Outcome: Progressing  Flowsheets (Taken 5/12/2025 2200)  Discharge to home or other facility with appropriate resources:   Identify barriers to discharge with patient and caregiver   Arrange for needed discharge resources and transportation as appropriate   Identify discharge learning needs (meds, wound care, etc)     Problem: Pain  Goal: Verbalizes/displays adequate comfort level or baseline comfort level  Outcome: Progressing  Flowsheets (Taken 5/12/2025 2200)  Verbalizes/displays adequate comfort level or baseline comfort level:   Administer analgesics based on type and severity of pain and evaluate response   Assess pain using appropriate pain scale   Encourage patient to monitor pain and request assistance   Implement non-pharmacological measures as appropriate and evaluate response     Problem: Skin/Tissue Integrity  Goal: Skin integrity remains intact  Description: 1.  Monitor for areas of redness and/or skin breakdown2.  Assess vascular access sites hourly3.  Every 4-6 hours minimum:  Change oxygen saturation probe site4.  Every 4-6 hours:  If on nasal continuous positive airway pressure, respiratory therapy assess nares and determine need for appliance change or resting period  5/12/2025 2200 by Marshal Mann RN  Outcome: Progressing     Problem: Nutrition Deficit:  Goal: Optimize nutritional status  5/12/2025 2200 by Marshal Mann RN  Outcome: Progressing  Flowsheets (Taken 5/12/2025 2200)  Nutrient intake appropriate for improving, restoring, or maintaining nutritional needs:   Assess nutritional status and recommend course of action   
  Problem: Safety - Adult  Goal: Free from fall injury  5/7/2025 2308 by Michelet Morataya RN  Outcome: Progressing  5/7/2025 1208 by Shaina Craig RN  Outcome: Progressing     Problem: Discharge Planning  Goal: Discharge to home or other facility with appropriate resources  5/7/2025 2308 by Michelet Morataya RN  Outcome: Progressing  5/7/2025 1208 by Shaina Craig RN  Outcome: Progressing     Problem: Pain  Goal: Verbalizes/displays adequate comfort level or baseline comfort level  5/7/2025 2308 by Michelet Morataya RN  Outcome: Progressing  5/7/2025 1208 by Shaina Craig RN  Outcome: Progressing     Problem: Skin/Tissue Integrity  Goal: Skin integrity remains intact  Description: 1.  Monitor for areas of redness and/or skin breakdown2.  Assess vascular access sites hourly3.  Every 4-6 hours minimum:  Change oxygen saturation probe site4.  Every 4-6 hours:  If on nasal continuous positive airway pressure, respiratory therapy assess nares and determine need for appliance change or resting period  5/7/2025 2308 by Michelet Morataya RN  Outcome: Progressing  Flowsheets (Taken 5/7/2025 2000)  Skin Integrity Remains Intact: Monitor for areas of redness and/or skin breakdown  5/7/2025 1208 by Shaina Craig RN  Outcome: Progressing     Problem: Safety - Medical Restraint  Goal: Remains free of injury from restraints (Restraint for Interference with Medical Device)  Description: INTERVENTIONS:1. Determine that other, less restrictive measures have been tried or would not be effective before applying the restraint2. Evaluate the patient's condition at the time of restraint application3. Inform patient/family regarding the reason for restraint4. Q2H: Monitor safety, psychosocial status, comfort, nutrition and hydration  5/7/2025 2308 by Michelet Morataya RN  Outcome: Progressing  Flowsheets  Taken 5/7/2025 2200 by Michelet Morataya RN  Remains free of injury from restraints (restraint for interference with medical 
  Problem: Safety - Adult  Goal: Free from fall injury  Outcome: Progressing     Problem: Discharge Planning  Goal: Discharge to home or other facility with appropriate resources  Outcome: Progressing     Problem: Pain  Goal: Verbalizes/displays adequate comfort level or baseline comfort level  Outcome: Progressing     Problem: Skin/Tissue Integrity  Goal: Skin integrity remains intact  Description: 1.  Monitor for areas of redness and/or skin breakdown2.  Assess vascular access sites hourly3.  Every 4-6 hours minimum:  Change oxygen saturation probe site4.  Every 4-6 hours:  If on nasal continuous positive airway pressure, respiratory therapy assess nares and determine need for appliance change or resting period  Outcome: Progressing  Flowsheets (Taken 5/8/2025 2000)  Skin Integrity Remains Intact: Monitor for areas of redness and/or skin breakdown     Problem: Safety - Medical Restraint  Goal: Remains free of injury from restraints (Restraint for Interference with Medical Device)  Description: INTERVENTIONS:1. Determine that other, less restrictive measures have been tried or would not be effective before applying the restraint2. Evaluate the patient's condition at the time of restraint application3. Inform patient/family regarding the reason for restraint4. Q2H: Monitor safety, psychosocial status, comfort, nutrition and hydration  Outcome: Progressing  Flowsheets  Taken 5/8/2025 1815 by Raquel Escoto RN  Remains free of injury from restraints (restraint for interference with medical device): Determine that other, less restrictive measures have been tried or would not be effective before applying the restraint  Taken 5/8/2025 1800 by Raquel Escoto RN  Remains free of injury from restraints (restraint for interference with medical device): Determine that other, less restrictive measures have been tried or would not be effective before applying the restraint  Taken 5/8/2025 1600 by Raquel Escoto 
  Problem: Safety - Adult  Goal: Free from fall injury  Outcome: Progressing     Problem: Discharge Planning  Goal: Discharge to home or other facility with appropriate resources  Outcome: Progressing  Flowsheets (Taken 5/10/2025 0835)  Discharge to home or other facility with appropriate resources: Identify barriers to discharge with patient and caregiver     Problem: Pain  Goal: Verbalizes/displays adequate comfort level or baseline comfort level  Outcome: Progressing     Problem: Skin/Tissue Integrity  Goal: Skin integrity remains intact  Description: 1.  Monitor for areas of redness and/or skin breakdown2.  Assess vascular access sites hourly3.  Every 4-6 hours minimum:  Change oxygen saturation probe site4.  Every 4-6 hours:  If on nasal continuous positive airway pressure, respiratory therapy assess nares and determine need for appliance change or resting period  Outcome: Progressing  Flowsheets (Taken 5/10/2025 0835)  Skin Integrity Remains Intact: Monitor for areas of redness and/or skin breakdown     Problem: Safety - Medical Restraint  Goal: Remains free of injury from restraints (Restraint for Interference with Medical Device)  Description: INTERVENTIONS:1. Determine that other, less restrictive measures have been tried or would not be effective before applying the restraint2. Evaluate the patient's condition at the time of restraint application3. Inform patient/family regarding the reason for restraint4. Q2H: Monitor safety, psychosocial status, comfort, nutrition and hydration  Outcome: Progressing     Problem: Nutrition Deficit:  Goal: Optimize nutritional status  Outcome: Progressing     
  Problem: Safety - Adult  Goal: Free from fall injury  Outcome: Progressing     Problem: Discharge Planning  Goal: Discharge to home or other facility with appropriate resources  Outcome: Progressing  Flowsheets (Taken 5/13/2025 2000 by Monalisa Trimble), RN)  Discharge to home or other facility with appropriate resources: Identify barriers to discharge with patient and caregiver     Problem: Pain  Goal: Verbalizes/displays adequate comfort level or baseline comfort level  Outcome: Progressing     Problem: Skin/Tissue Integrity  Goal: Skin integrity remains intact  Description: 1.  Monitor for areas of redness and/or skin breakdown2.  Assess vascular access sites hourly3.  Every 4-6 hours minimum:  Change oxygen saturation probe site4.  Every 4-6 hours:  If on nasal continuous positive airway pressure, respiratory therapy assess nares and determine need for appliance change or resting period  Outcome: Progressing     Problem: Safety - Medical Restraint  Goal: Remains free of injury from restraints (Restraint for Interference with Medical Device)  Description: INTERVENTIONS:1. Determine that other, less restrictive measures have been tried or would not be effective before applying the restraint2. Evaluate the patient's condition at the time of restraint application3. Inform patient/family regarding the reason for restraint4. Q2H: Monitor safety, psychosocial status, comfort, nutrition and hydration  Outcome: Progressing     Problem: Nutrition Deficit:  Goal: Optimize nutritional status  Outcome: Progressing     
  Problem: Safety - Adult  Goal: Free from fall injury  Outcome: Progressing     Problem: Pain  Goal: Verbalizes/displays adequate comfort level or baseline comfort level  Outcome: Progressing     Problem: Skin/Tissue Integrity  Goal: Skin integrity remains intact  Description: 1.  Monitor for areas of redness and/or skin breakdown2.  Assess vascular access sites hourly3.  Every 4-6 hours minimum:  Change oxygen saturation probe site4.  Every 4-6 hours:  If on nasal continuous positive airway pressure, respiratory therapy assess nares and determine need for appliance change or resting period  Outcome: Progressing     Problem: Safety - Medical Restraint  Goal: Remains free of injury from restraints (Restraint for Interference with Medical Device)  Description: INTERVENTIONS:1. Determine that other, less restrictive measures have been tried or would not be effective before applying the restraint2. Evaluate the patient's condition at the time of restraint application3. Inform patient/family regarding the reason for restraint4. Q2H: Monitor safety, psychosocial status, comfort, nutrition and hydration  Outcome: Progressing  Flowsheets (Taken 5/7/2025 7904 by Marcelo Schilling RN)  Remains free of injury from restraints (restraint for interference with medical device): Determine that other, less restrictive measures have been tried or would not be effective before applying the restraint     Problem: Discharge Planning  Goal: Discharge to home or other facility with appropriate resources  Outcome: Progressing     
INFECTIOUS DISEASE discharge plan:    Diagnosis: E.coli bacteremia    Antibiotic: ceftriaxone IV 2 g Q 24 hrs through 5/17/25    PICC line/Midline insertion for outpatient antibiotic.     Routine PICC/Shelli/ Portacath Care including PRN catheter flow management    Weekly labs with results fax to # [phone]. Call critical lab values to [phone].   [x] CBC w/diff  [x] BUN/Creatinine  [x] AST, ALT  [] CPK  [] CRP, ESR    Home Health to pull PICC line/Midline after last dose or send to IR for Shelli removal    May send to IR for line evaluation or replacement PRN Phone # [phone]         
Speech LAnguage Pathology TREATMENT    Patient: Fan Vang (74 y.o. male)  Date: 5/12/2025  Primary Diagnosis: Hypoxemia [R09.02]  Gross hematuria [R31.0]  SIRS (systemic inflammatory response syndrome) ([facility]) [R65.10]  Sepsis ([facility]) [A41.9]  Hypotension, unspecified hypotension type [I95.9]  Altered mental status, unspecified altered mental status type [R41.82]       Precautions:  Fall Risk                  ASSESSMENT :  Patient with functional oral phase of swallow with advanced consistency solids (Regular). Patient without overt signs or symptoms of aspiration as well. Patient recent CTA Chest showed increased RLL atelectasis, may be concerning for PNA given his risk of aspiration, however the atelectasis is also chronic. Given improvement in bedside presentation, do suspect we are approaching patient's baseline swallow function. Will continue to follow to ensure diet tolerance in the acute setting.    Patient will benefit from skilled intervention to address the above impairments.     PLAN :  Recommendations and Planned Interventions:  Diet: Regular and thin liquids  - General aspiration precautions: upright for all oral intake, small bites/sips, slow rate of intake  - Oral meds per patient preference (likely whole with thin liquids vs whole in applesauce, avoid crushing unless absolutely necessary)  - Oral hygiene BID, encourage self care with toothbrush/toothpaste          Acute SLP Services: Yes, SLP will continue to follow per plan of care.  Discharge Recommendations: Yes, recommend SLP treatment at next level of care     SUBJECTIVE:   Patient stated, “Do you all have a chocolate milkshake or something?”    OBJECTIVE:     Past Medical History:   Diagnosis Date    Arthritis     back and left knee    Dependence on walking stick     bilateral/single hiking stick if walking distance    Fractures left greater trochanter, chronic right ribs 4-7    Gout     Osteoarthritis     Parkinson's disease (HCC)     S/P deep 
Speech LAnguage Pathology TREATMENT    Patient: Fan Vang (74 y.o. male)  Date: 5/8/2025  Primary Diagnosis: Hypoxemia [R09.02]  Gross hematuria [R31.0]  SIRS (systemic inflammatory response syndrome) (HCC) [R65.10]  Sepsis (HCC) [A41.9]  Hypotension, unspecified hypotension type [I95.9]  Altered mental status, unspecified altered mental status type [R41.82]       Precautions:                     ASSESSMENT :  Patient with increase in alertness and interaction compared to this SLP's initial evaluation in the ED two days prior. Patient has very low vocal intensity and poor motor speech skills, consistent with Parkinson's disease. Patient is oriented to self-only and is quite anxious upon SLP arrival. Patient required physical assistance to close his mouth and breathe through nares slowly to ease anxiety. Patient with noted subjective change in vocal quality with ice chips and small sips of water from straw. No obvious signs or symptoms of aspiration with pureed solids, though note oral residue (clears easily with sip of thin liquid). After sequential sips of thin liquids, patient again with change in vocal quality and delayed strong cough response. Given his acute confusion related to sepsis and Parkinson's disease he is at risk for aspiration (prandial, silent, and post-prandial). Do not feel he would tolerate FEES or MBSS at this time given his confusion, also in bilat soft wrist restraints at this time.     Discussed on the phone with patient's wife from [phone]. She is in agreement, she does not feel her  would currently tolerate an objective imaging of swallow, also noting that RN had difficulty passing DHT through nares yesterday. She was educated on patient's risk of aspiration, as well as his risk for developing aspiration PNA (higher at this time due to non ambulatory status and lessened oral care while in the hospital, unfortunately). She verbalizes understanding of this, and agrees to POC to 
Speech LAnguage Pathology TREATMENT    Patient: Fan Vang (74 y.o. male)  Date: 5/9/2025  Primary Diagnosis: Hypoxemia [R09.02]  Gross hematuria [R31.0]  SIRS (systemic inflammatory response syndrome) (HCC) [R65.10]  Sepsis (HCC) [A41.9]  Hypotension, unspecified hypotension type [I95.9]  Altered mental status, unspecified altered mental status type [R41.82]       Precautions:  Fall Risk                  ASSESSMENT :  Patient seen with advanced solid trials with slightly prolonged mastication but ultimately adequate oral clearance from oral cavity. Patient continues with +cough at bedside, intermittent and also 1:1 with thin liquids. Re-educated patient's wife, Nicole, at bedside on aspiration risk and high likelihood patient is aspirating. Nicole asks how the POC might change if FEES/MBSS were completed. Educated Nicole that if thickened liquids were appropriate and prevented aspiration, we may modify his diet further, while also acknowledging this may impact his QOL and given the degenerative nature of PD, his swallow may not make much recovery to a point where he consumes a regular diet without aspiration in the future. Considering this, Nicole does not wish to proceed with objective imaging at this time; knowing the risks of aspiration, would like to continue on oral diet to provide nutrition and comfort to patient. SLP will continue to follow.     Patient will benefit from skilled intervention to address the above impairments.     PLAN :  Recommendations and Planned Interventions:  Diet: Soft and bite sized and thin liquids  - 1:1 FEED  - SMALL BITES/SIPS  - ALTERNATE BITE/SIP  - Attempt oral meds WHOLE in applesauce, avoid crushing unless absolutely necessary  - Oral care TID with suction toothette kit and oral antiseptic vs toothbrush/toothpaste with Y[place]auer to suction afterwards   - Monitor clinical tolerance via WBC, CXR  - Do not withhold PO if patient is coughing as aspiration risk is known, unless of 
Speech LAnguage Pathology TREATMENT/DISCHARGE    Patient: Fan Vang (74 y.o. male)  Date: 5/13/2025  Primary Diagnosis: Hypoxemia [R09.02]  Gross hematuria [R31.0]  SIRS (systemic inflammatory response syndrome) (HCC) [R65.10]  Sepsis (HCC) [A41.9]  Hypotension, unspecified hypotension type [I95.9]  Altered mental status, unspecified altered mental status type [R41.82]       Precautions:  Fall Risk                  ASSESSMENT :  Patient and patient's wife seen for follow-up to address swallowing. Patient has had minimal to no appetite. He has only wanted liquid consistencies such as ice cream. Spent time talking with his wife. Offered again to complete objective imaging of the swallow versus allowing the patient to have diet for comfort without force feeding. Patient's wife sincerely appreciated the conversation. She stated that she felt that she wants the patient to eat if he wants to eat and not to force feed. They would not want a feeding tube, and she stated that if we determined he was aspirating that it would not change the fact that she wants him to be comfortable, which is extremely reasonable. Explained that, should she change her mind, SLP would be happy to complete objective imaging of the swallow, but otherwise will sign off.     Patient will be discharged from skilled speech-language pathology services at this time.     PLAN :  Recommendations and Planned Interventions:  Diet: Regular and thin liquids  Diet as tolerated- do not force feed         Acute SLP Services: No, patient will be discharged from acute skilled speech-language pathology at this time.  Discharge Recommendations: No, additional SLP treatment not indicated at discharge     SUBJECTIVE:   Patient did not respond to SLP.     OBJECTIVE:     Past Medical History:   Diagnosis Date    Arthritis     back and left knee    Dependence on walking stick     bilateral/single hiking stick if walking distance    Fractures left greater trochanter, 
Cognitive Status: Exceptions  Arousal/Alertness: Delayed responses to stimuli  Following Commands: Follows one step commands with increased time;Follows one step commands with repetition  Attention Span: Attends with cues to redirect;Difficulty attending to directions;Difficulty dividing attention  Memory: Decreased recall of biographical Information;Decreased short term memory;Decreased long term memory  Safety Judgement: Decreased awareness of need for assistance;Decreased awareness of need for safety  Problem Solving: Assistance required to implement solutions;Assistance required to identify errors made;Assistance required to generate solutions  Insights: Decreased awareness of deficits  Initiation: Requires cues for some  Sequencing: Requires cues for some    Functional Mobility and Transfers for ADLs:  Bed Mobility:  Bed Mobility Training  Bed Mobility Training: Yes  Supine to Sit: Partial/Moderate assistance  Sit to Supine: Partial/Moderate assistance  Scooting: Partial/Moderate assistance     Transfers:               Balance:     Balance  Sitting: Impaired  Sitting - Static: Fair (occasional)  Sitting - Dynamic: Poor (constant support)  Standing: Impaired  Standing - Static: Poor  Standing - Dynamic: Poor      ADL Intervention:         Feeding: Moderate assistance   Feeding Skilled Clinical Factors: mod A to drink     Grooming: Minimal assistance   Grooming Skilled Clinical Factors: min A to wash face                    Pain Ratin/10     Activity Tolerance:   Fair , requires frequent rest breaks, and desaturates with activity and requires oxygen  Please refer to the flowsheet for vital signs taken during this treatment.    After treatment:   Patient left in no apparent distress in bed, Call bell within reach, Bed/ chair alarm activated, Caregiver / family present, and Side rails x3    COMMUNICATION/EDUCATION:   The patient's plan of care was discussed with: registered nurse    Patient 
interventions, and recommended diet changes were discussed with: Registered nurse    Patient is unable to participate in goal setting and plan of care    Thank you,  Antoinette Newman, SLP    SLP Individual Minutes  Time In: 1440  Time Out: 1500  Minutes: 20     Problem: SLP Adult - Impaired Swallowing  Goal: By Discharge: Advance to least restrictive diet without signs or symptoms of aspiration for planned discharge setting.  See evaluation for individualized goals.  Description: Speech Therapy Goals  Initiated 5/6/2025  1. Patient will participate in ongoing assessment of swallow at bedside within 7 days.   2. Patient will initiate oral diet within 7 days.  Outcome: Progressing

## 2025-05-14 NOTE — DISCHARGE INSTRUCTIONS
Discharge SNF/Rehab Instructions/LTAC       PATIENT ID: Fan Vang  MRN: [id number]   YOB: 1950    DATE OF ADMISSION: [unfilled]    DATE OF DISCHARGE: 5/14/2025    PRIMARY CARE PROVIDER: @PCP@       ATTENDING PHYSICIAN: [unfilled]  DISCHARGING PROVIDER: Poli Ribeiro MD     To contact this individual call [phone] and ask the  to page.   If unavailable ask to be transferred the Adult Hospitalist Department.    CONSULTATIONS: [unfilled]    PROCEDURES/SURGERIES: * No surgery found *    ADMITTING DIAGNOSES & HOSPITAL COURSE:   Severe sepsis/septic shock w/ Hypotension (improved)  E. coli Bacteremia  Pyelonephritis secondary to Acute cystitis w/ Hematuria  - Patient came hypotensive, tachycardic, febrile, with significant leukocytosis, and altered mental status;  - Procalcitonin 37  - Blood culture 5/5 4/4 bottles E coli  - repeat blood culture 5/8 no growth so far  - Voiding trial done 5/8  - Empirically started vancomycin/Zosyn, now switched on Ceftriaxone. IV ceftriaxone through 5/17  -s/p midline 5/13  - Appreciate ID, Urology  -Discharge today     Hypokalemia: Replete as needed  Hypophosphatemia: Replete as needed  Hyponatremia Hypovolemic - Resolved  Hypocalcemia - Resolved  Hypomagnesemia - Resolved  Parkinson's disease with dementia - s/p deep brain stimulator, restart Sinemet  Hypothyroidism: Continue Synthroid and Cytomel; Increase dose of Synthroid to 100 mcg     Anemia  - Iron deficiency and anemia of chronic disease  -Total iron is 8, iron saturation cannot be calculated, in December it was 18;   - Start iron supplements when more awake  - Avoid IV iron in the setting of bacteremia     Severe protein calorie malnutrition:  Dysphagia  - Appreciate SLP cleared for full liquids, do not withhold PO if patient is coughing as aspiration is known      PENDING TEST RESULTS:   At the time of discharge the following test results are still pending: none    FOLLOW UP APPOINTMENTS:

## 2025-05-14 NOTE — CARE COORDINATION
Transition of Care Plan to SNF/Rehab    Communication to Patient/Family:  Met with patient and family and they are agreeable to the transition plan. The Plan for Transition of Care is related to the following treatment goals: [facility]    The Patient and/or patient representative was provided with a choice of provider and agrees  with the discharge plan.      Yes [x] No []    A Freedom of choice list was provided with basic dialogue that supports the patient's individualized plan of care/goals and shares the quality data associated with the providers.       Yes [x] No []    SNF/Rehab Transition:  Patient has been accepted to [facility] SNF/Rehab and meets criteria for admission.   Date of Inpatient Status Order:   Patient will transported by [facility] and expected to leave at 4pm.    Communication to SNF/Rehab:  Bedside RN, Jose, has been notified to update the transition plan to the facility and call report ([phone]).  Discharge information has been updated on the AVS. And communicated to facility via [employer], or CC link.     Discharge instructions to be fax'd to facility at (Fax #).     Nursing Please include all hard scripts for controlled substances, med rec and dc summary, and AVS in packet.     Reviewed and confirmed with facility, [facility], can manage the patient care needs for the following:     David with (X) only those applicable:  Medication:  [x]Medications are available at the facility  [x]IV Antibiotics    []Controlled Substance - hard copies available sent.  []Weekly Labs    Equipment:  []CPAP/BiPAP  []Wound Vacuum  []Pino or Urinary Device  []PICC/Central Line  []Nebulizer  []Ventilator    Treatment:  []Isolation (for MRSA, VRE, etc.)  []Surgical Drain Management  []Tracheostomy Care  []Dressing Changes  []Dialysis with transportation  []PEG Care  []Oxygen  []Daily Weights for Heart Failure    Dietary:  []Any diet limitations  []Tube Feedings   []Total Parenteral

## 2025-05-14 NOTE — PALLIATIVE CARE DISCHARGE
Goals of Care/Treatment Preferences    The Palliative Medicine team was consulted as part of your/your loved one's care in the hospital. Our team is a supportive service; we strive to relieve suffering and improve quality of life.    We reviewed advance care planning information, which includes the following:    Primary Decision Maker: Rianna Vang - [facility] - [phone]  Patient's Healthcare Decision Maker is:: Legal Next of Kin  Confirm Advance Directive: Yes, on file  Does the patient have other document types?: Do Not Resuscitate    Patient/Health Care Proxy Stated Goals: Recovery from acute illness    We reviewed / discussed your code status as:   Code Status: DNR     “Full Code” means perform CPR in the event of cardiac arrest.      “DNR” means do NOT perform CPR in the event of cardiac arrest.      “Partial Code” means you have specific preferences, please discuss with your healthcare team.      “No Order” means this issue was not addressed / resolved during your stay      Because of the importance of this information, we are providing you with a printed copy to share with other healthcare providers after this hospitalization is complete.

## 2025-05-14 NOTE — DISCHARGE SUMMARY
coughing as aspiration is known      PENDING TEST RESULTS:   At the time of discharge the following test results are still pending: none    FOLLOW UP APPOINTMENTS:    PCP       ADDITIONAL CARE RECOMMENDATIONS:   Aspiration precautions    DIET: regular diet    ACTIVITY: activity as tolerated      DISCHARGE MEDICATIONS:   See Medication Reconciliation Form      NOTIFY YOUR PHYSICIAN FOR ANY OF THE FOLLOWING:   Fever over 101 degrees for 24 hours.   Chest pain, shortness of breath, fever, chills, nausea, vomiting, diarrhea, change in mentation, falling, weakness, bleeding. Severe pain or pain not relieved by medications.  Or, any other signs or symptoms that you may have questions about.    DISPOSITION:   x Home With:   OT  PT  HH  RN       SNF/Inpatient Rehab/LTAC    Independent/assisted living    Hospice    Other:       PATIENT CONDITION AT DISCHARGE:     Functional status    Poor     Deconditioned    x Independent      Cognition    x Lucid     Forgetful     Dementia      Catheters/lines (plus indication)    Pino     PICC     PEG    x None      Code status    x Full code     DNR      PHYSICAL EXAMINATION AT DISCHARGE:  Please see progress note    DISCHARGE MEDICATIONS:     Medication List        START taking these medications      cefTRIAXone 500 MG injection  Commonly known as: ROCEPHIN  Inject 2,000 mg into the muscle every 24 hours for 3 days            CHANGE how you take these medications      liothyronine 5 MCG tablet  Commonly known as: CYTOMEL  What changed: Another medication with the same name was removed. Continue taking this medication, and follow the directions you see here.     Stalevo 200 -200 MG Tabs per tablet  Generic drug: carbidopa-levodopa-entacapone  What changed: Another medication with the same name was removed. Continue taking this medication, and follow the directions you see here.            CONTINUE taking these medications      acetaminophen 500 MG tablet  Commonly known as:

## 2025-05-22 ENCOUNTER — HOSPITAL ENCOUNTER (EMERGENCY)
Facility: HOSPITAL | Age: 75
Discharge: HOME | DRG: 552 | End: 2025-05-25
Payer: MEDICARE

## 2025-05-22 ENCOUNTER — APPOINTMENT (OUTPATIENT)
Facility: HOSPITAL | Age: 75
DRG: 552 | End: 2025-05-22
Payer: MEDICARE

## 2025-05-22 ENCOUNTER — HOSPITAL ENCOUNTER (INPATIENT)
Facility: HOSPITAL | Age: 75
LOS: 5 days | Discharge: HOSPICE | DRG: 552 | End: 2025-05-28
Attending: STUDENT IN AN ORGANIZED HEALTH CARE EDUCATION/TRAINING PROGRAM | Admitting: FAMILY MEDICINE
Payer: MEDICARE
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
## 2025-05-22 DIAGNOSIS — S12.110A CLOSED ODONTOID FRACTURE WITH TYPE II MORPHOLOGY AND ANTERIOR DISPLACEMENT, INITIAL ENCOUNTER (HCC): ICD-10-CM

## 2025-05-22 DIAGNOSIS — S12.000A CLOSED DISPLACED FRACTURE OF FIRST CERVICAL VERTEBRA, UNSPECIFIED FRACTURE MORPHOLOGY, INITIAL ENCOUNTER (HCC): ICD-10-CM

## 2025-05-22 DIAGNOSIS — S09.90XA CLOSED HEAD INJURY, INITIAL ENCOUNTER: ICD-10-CM

## 2025-05-22 PROBLEM — S12.9XXS: Status: ACTIVE | Noted: 2025-05-22

## 2025-05-22 LAB
ALBUMIN SERPL-MCNC: 3.3 G/DL (ref 3.5–5)
ALBUMIN/GLOB SERPL: 0.9 (ref 1.1–2.2)
ALP SERPL-CCNC: 60 U/L (ref 45–117)
ALT SERPL-CCNC: 9 U/L (ref 12–78)
ANION GAP SERPL CALC-SCNC: 2 MMOL/L (ref 2–12)
AST SERPL-CCNC: 25 U/L (ref 15–37)
BASOPHILS # BLD: 0.07 K/UL (ref 0–0.1)
BASOPHILS NFR BLD: 1 % (ref 0–1)
BILIRUB SERPL-MCNC: 0.9 MG/DL (ref 0.2–1)
BUN SERPL-MCNC: 12 MG/DL (ref 6–20)
BUN/CREAT SERPL: 16 (ref 12–20)
CALCIUM SERPL-MCNC: 8.7 MG/DL (ref 8.5–10.1)
CHLORIDE SERPL-SCNC: 103 MMOL/L (ref 97–108)
CO2 SERPL-SCNC: 30 MMOL/L (ref 21–32)
COMMENT:: NORMAL
CREAT SERPL-MCNC: 0.74 MG/DL (ref 0.7–1.3)
DIFFERENTIAL METHOD BLD: ABNORMAL
EOSINOPHIL # BLD: 0.13 K/UL (ref 0–0.4)
EOSINOPHIL NFR BLD: 2 % (ref 0–7)
ERYTHROCYTE [DISTWIDTH] IN BLOOD BY AUTOMATED COUNT: 16.1 % (ref 11.5–14.5)
GLOBULIN SER CALC-MCNC: 3.8 G/DL (ref 2–4)
GLUCOSE SERPL-MCNC: 90 MG/DL (ref 65–100)
HCT VFR BLD AUTO: 27.4 % (ref 36.6–50.3)
HGB BLD-MCNC: 8.7 G/DL (ref 12.1–17)
IMM GRANULOCYTES # BLD AUTO: 0 K/UL
IMM GRANULOCYTES NFR BLD AUTO: 0 %
LYMPHOCYTES # BLD: 0.59 K/UL (ref 0.8–3.5)
LYMPHOCYTES NFR BLD: 9 % (ref 12–49)
MAGNESIUM SERPL-MCNC: 2 MG/DL (ref 1.6–2.4)
MCH RBC QN AUTO: 31.3 PG (ref 26–34)
MCHC RBC AUTO-ENTMCNC: 31.8 G/DL (ref 30–36.5)
MCV RBC AUTO: 98.6 FL (ref 80–99)
MONOCYTES # BLD: 0.53 K/UL (ref 0–1)
MONOCYTES NFR BLD: 8 % (ref 5–13)
NEUTS SEG # BLD: 5.28 K/UL (ref 1.8–8)
NEUTS SEG NFR BLD: 80 % (ref 32–75)
NRBC # BLD: 0 K/UL (ref 0–0.01)
NRBC BLD-RTO: 0 PER 100 WBC
PLATELET # BLD AUTO: 370 K/UL (ref 150–400)
PMV BLD AUTO: 9.2 FL (ref 8.9–12.9)
POTASSIUM SERPL-SCNC: 3.2 MMOL/L (ref 3.5–5.1)
PROT SERPL-MCNC: 7.1 G/DL (ref 6.4–8.2)
RBC # BLD AUTO: 2.78 M/UL (ref 4.1–5.7)
RBC MORPH BLD: ABNORMAL
SODIUM SERPL-SCNC: 135 MMOL/L (ref 136–145)
SPECIMEN HOLD: NORMAL
WBC # BLD AUTO: 6.6 K/UL (ref 4.1–11.1)

## 2025-05-22 PROCEDURE — 72125 CT NECK SPINE W/O DYE: CPT

## 2025-05-22 PROCEDURE — 72131 CT LUMBAR SPINE W/O DYE: CPT

## 2025-05-22 PROCEDURE — 70450 CT HEAD/BRAIN W/O DYE: CPT

## 2025-05-22 PROCEDURE — 72128 CT CHEST SPINE W/O DYE: CPT

## 2025-05-22 PROCEDURE — 71045 X-RAY EXAM CHEST 1 VIEW: CPT

## 2025-05-22 PROCEDURE — 83735 ASSAY OF MAGNESIUM: CPT

## 2025-05-22 PROCEDURE — 99285 EMERGENCY DEPT VISIT HI MDM: CPT

## 2025-05-22 PROCEDURE — 93005 ELECTROCARDIOGRAM TRACING: CPT | Performed by: STUDENT IN AN ORGANIZED HEALTH CARE EDUCATION/TRAINING PROGRAM

## 2025-05-22 PROCEDURE — 85025 COMPLETE CBC W/AUTO DIFF WBC: CPT

## 2025-05-22 PROCEDURE — 80053 COMPREHEN METABOLIC PANEL: CPT

## 2025-05-22 PROCEDURE — G0378 HOSPITAL OBSERVATION PER HR: HCPCS

## 2025-05-22 RX ORDER — POTASSIUM CHLORIDE 7.45 MG/ML
10 INJECTION INTRAVENOUS PRN
Status: DISCONTINUED | OUTPATIENT
Start: 2025-05-22 | End: 2025-05-27

## 2025-05-22 RX ORDER — LEVOTHYROXINE SODIUM 25 UG/1
12.5 TABLET ORAL DAILY
Status: DISCONTINUED | OUTPATIENT
Start: 2025-05-23 | End: 2025-05-23 | Stop reason: CLARIF

## 2025-05-22 RX ORDER — ONDANSETRON 2 MG/ML
4 INJECTION INTRAMUSCULAR; INTRAVENOUS EVERY 6 HOURS PRN
Status: DISCONTINUED | OUTPATIENT
Start: 2025-05-22 | End: 2025-05-28 | Stop reason: HOSPADM

## 2025-05-22 RX ORDER — OXYCODONE HYDROCHLORIDE 5 MG/1
5 TABLET ORAL EVERY 4 HOURS PRN
Refills: 0 | Status: DISCONTINUED | OUTPATIENT
Start: 2025-05-22 | End: 2025-05-28 | Stop reason: HOSPADM

## 2025-05-22 RX ORDER — CARBIDOPA, LEVODOPA AND ENTACAPONE 50; 200; 200 MG/1; MG/1; MG/1
1 TABLET, FILM COATED ORAL SEE ADMIN INSTRUCTIONS
Status: DISCONTINUED | OUTPATIENT
Start: 2025-05-22 | End: 2025-05-23 | Stop reason: SDUPTHER

## 2025-05-22 RX ORDER — SODIUM CHLORIDE 9 MG/ML
INJECTION, SOLUTION INTRAVENOUS PRN
Status: DISCONTINUED | OUTPATIENT
Start: 2025-05-22 | End: 2025-05-28 | Stop reason: HOSPADM

## 2025-05-22 RX ORDER — MAGNESIUM SULFATE IN WATER 40 MG/ML
2000 INJECTION, SOLUTION INTRAVENOUS PRN
Status: DISCONTINUED | OUTPATIENT
Start: 2025-05-22 | End: 2025-05-27

## 2025-05-22 RX ORDER — MIRTAZAPINE 15 MG/1
45 TABLET, FILM COATED ORAL NIGHTLY
Status: DISCONTINUED | OUTPATIENT
Start: 2025-05-22 | End: 2025-05-28 | Stop reason: HOSPADM

## 2025-05-22 RX ORDER — HEPARIN SODIUM 5000 [USP'U]/ML
5000 INJECTION, SOLUTION INTRAVENOUS; SUBCUTANEOUS EVERY 8 HOURS SCHEDULED
Status: DISCONTINUED | OUTPATIENT
Start: 2025-05-23 | End: 2025-05-28 | Stop reason: HOSPADM

## 2025-05-22 RX ORDER — ACETAMINOPHEN 325 MG/1
650 TABLET ORAL EVERY 6 HOURS PRN
Status: DISCONTINUED | OUTPATIENT
Start: 2025-05-22 | End: 2025-05-28 | Stop reason: HOSPADM

## 2025-05-22 RX ORDER — ONDANSETRON 4 MG/1
4 TABLET, ORALLY DISINTEGRATING ORAL EVERY 8 HOURS PRN
Status: DISCONTINUED | OUTPATIENT
Start: 2025-05-22 | End: 2025-05-28 | Stop reason: HOSPADM

## 2025-05-22 RX ORDER — SODIUM CHLORIDE 0.9 % (FLUSH) 0.9 %
5-40 SYRINGE (ML) INJECTION PRN
Status: DISCONTINUED | OUTPATIENT
Start: 2025-05-22 | End: 2025-05-28 | Stop reason: HOSPADM

## 2025-05-22 RX ORDER — LEVOTHYROXINE SODIUM 25 UG/1
62.5 TABLET ORAL DAILY
Status: DISCONTINUED | OUTPATIENT
Start: 2025-05-23 | End: 2025-05-28 | Stop reason: HOSPADM

## 2025-05-22 RX ORDER — POLYETHYLENE GLYCOL 3350 17 G/17G
17 POWDER, FOR SOLUTION ORAL DAILY PRN
Status: DISCONTINUED | OUTPATIENT
Start: 2025-05-22 | End: 2025-05-28 | Stop reason: HOSPADM

## 2025-05-22 RX ORDER — SODIUM CHLORIDE 0.9 % (FLUSH) 0.9 %
5-40 SYRINGE (ML) INJECTION EVERY 12 HOURS SCHEDULED
Status: DISCONTINUED | OUTPATIENT
Start: 2025-05-22 | End: 2025-05-28 | Stop reason: HOSPADM

## 2025-05-22 RX ORDER — LIOTHYRONINE SODIUM 5 UG/1
5 TABLET ORAL 2 TIMES DAILY
Status: DISCONTINUED | OUTPATIENT
Start: 2025-05-23 | End: 2025-05-28 | Stop reason: HOSPADM

## 2025-05-22 RX ORDER — POTASSIUM CHLORIDE 750 MG/1
40 TABLET, EXTENDED RELEASE ORAL PRN
Status: DISCONTINUED | OUTPATIENT
Start: 2025-05-22 | End: 2025-05-27

## 2025-05-22 RX ORDER — PAROXETINE 20 MG/1
30 TABLET, FILM COATED ORAL NIGHTLY
Status: DISCONTINUED | OUTPATIENT
Start: 2025-05-22 | End: 2025-05-28 | Stop reason: HOSPADM

## 2025-05-22 RX ORDER — ACETAMINOPHEN 650 MG/1
650 SUPPOSITORY RECTAL EVERY 6 HOURS PRN
Status: DISCONTINUED | OUTPATIENT
Start: 2025-05-22 | End: 2025-05-28 | Stop reason: HOSPADM

## 2025-05-22 RX ORDER — HYDROXYZINE HYDROCHLORIDE 25 MG/1
25 TABLET, FILM COATED ORAL 2 TIMES DAILY PRN
Status: DISCONTINUED | OUTPATIENT
Start: 2025-05-22 | End: 2025-05-28 | Stop reason: HOSPADM

## 2025-05-22 ASSESSMENT — PAIN DESCRIPTION - ONSET: ONSET: ON-GOING

## 2025-05-22 ASSESSMENT — PAIN DESCRIPTION - DESCRIPTORS: DESCRIPTORS: ACHING;DISCOMFORT

## 2025-05-22 ASSESSMENT — PAIN DESCRIPTION - LOCATION: LOCATION: BACK

## 2025-05-22 ASSESSMENT — PAIN - FUNCTIONAL ASSESSMENT
PAIN_FUNCTIONAL_ASSESSMENT: 0-10
PAIN_FUNCTIONAL_ASSESSMENT: ACTIVITIES ARE NOT PREVENTED

## 2025-05-22 ASSESSMENT — PAIN SCALES - GENERAL: PAINLEVEL_OUTOF10: 8

## 2025-05-22 ASSESSMENT — PAIN DESCRIPTION - ORIENTATION: ORIENTATION: POSTERIOR

## 2025-05-22 ASSESSMENT — PAIN DESCRIPTION - FREQUENCY: FREQUENCY: CONTINUOUS

## 2025-05-22 ASSESSMENT — PAIN DESCRIPTION - PAIN TYPE: TYPE: ACUTE PAIN

## 2025-05-22 NOTE — ED TRIAGE NOTES
Pt arrives to ED via EMS from [facility] living with reports of AMS. EMS reports he had an unwitnessed GLF. Presenting with bruising/ swelling on the right side of his head. EMS reports pt began getting agitated, had an episode of bowel incontinence, which the nurses there report is not typical behavior. EMS reports he was given atarax 90 minutes PTA but remains altered.     Hx Parkinsons, Dementia, HTN, no thinners    Dr. Nichole assessing pt on arrival

## 2025-05-23 PROBLEM — S12.110A: Status: ACTIVE | Noted: 2025-05-23

## 2025-05-23 LAB
EKG ATRIAL RATE: 326 BPM
EKG DIAGNOSIS: NORMAL
EKG P AXIS: 225 DEGREES
EKG P-R INTERVAL: 174 MS
EKG Q-T INTERVAL: 454 MS
EKG QRS DURATION: 94 MS
EKG QTC CALCULATION (BAZETT): 468 MS
EKG R AXIS: 7 DEGREES
EKG T AXIS: 72 DEGREES
EKG VENTRICULAR RATE: 64 BPM

## 2025-05-23 PROCEDURE — 6360000002 HC RX W HCPCS: Performed by: STUDENT IN AN ORGANIZED HEALTH CARE EDUCATION/TRAINING PROGRAM

## 2025-05-23 PROCEDURE — 96372 THER/PROPH/DIAG INJ SC/IM: CPT

## 2025-05-23 PROCEDURE — 2500000003 HC RX 250 WO HCPCS: Performed by: STUDENT IN AN ORGANIZED HEALTH CARE EDUCATION/TRAINING PROGRAM

## 2025-05-23 PROCEDURE — 6370000000 HC RX 637 (ALT 250 FOR IP): Performed by: HOSPITALIST

## 2025-05-23 PROCEDURE — 1100000000 HC RM PRIVATE

## 2025-05-23 PROCEDURE — 6370000000 HC RX 637 (ALT 250 FOR IP): Performed by: NURSE PRACTITIONER

## 2025-05-23 PROCEDURE — 6370000000 HC RX 637 (ALT 250 FOR IP): Performed by: STUDENT IN AN ORGANIZED HEALTH CARE EDUCATION/TRAINING PROGRAM

## 2025-05-23 RX ORDER — FERROUS SULFATE 325(65) MG
325 TABLET ORAL DAILY
Status: DISCONTINUED | OUTPATIENT
Start: 2025-05-23 | End: 2025-05-28 | Stop reason: HOSPADM

## 2025-05-23 RX ORDER — ENTACAPONE 200 MG/1
200 TABLET ORAL
Status: DISCONTINUED | OUTPATIENT
Start: 2025-05-23 | End: 2025-05-28 | Stop reason: HOSPADM

## 2025-05-23 RX ORDER — CARBIDOPA AND LEVODOPA 25; 100 MG/1; MG/1
2 TABLET ORAL
Status: DISCONTINUED | OUTPATIENT
Start: 2025-05-23 | End: 2025-05-28 | Stop reason: HOSPADM

## 2025-05-23 RX ORDER — LACTOBACILLUS RHAMNOSUS GG 10B CELL
1 CAPSULE ORAL
Status: DISCONTINUED | OUTPATIENT
Start: 2025-05-24 | End: 2025-05-28 | Stop reason: HOSPADM

## 2025-05-23 RX ORDER — OLANZAPINE 5 MG/1
2.5 TABLET, ORALLY DISINTEGRATING ORAL 2 TIMES DAILY PRN
Status: DISCONTINUED | OUTPATIENT
Start: 2025-05-23 | End: 2025-05-28 | Stop reason: HOSPADM

## 2025-05-23 RX ADMIN — CARBIDOPA AND LEVODOPA 2 TABLET: 25; 100 TABLET ORAL at 17:24

## 2025-05-23 RX ADMIN — HEPARIN SODIUM 5000 UNITS: 5000 INJECTION INTRAVENOUS; SUBCUTANEOUS at 06:40

## 2025-05-23 RX ADMIN — ENTACAPONE 200 MG: 200 TABLET, FILM COATED ORAL at 17:25

## 2025-05-23 RX ADMIN — FERROUS SULFATE TAB 325 MG (65 MG ELEMENTAL FE) 325 MG: 325 (65 FE) TAB at 16:40

## 2025-05-23 RX ADMIN — MIRTAZAPINE 45 MG: 15 TABLET, FILM COATED ORAL at 01:00

## 2025-05-23 RX ADMIN — HYDROXYZINE HYDROCHLORIDE 25 MG: 25 TABLET, FILM COATED ORAL at 11:42

## 2025-05-23 RX ADMIN — CARBIDOPA AND LEVODOPA 2 TABLET: 25; 100 TABLET ORAL at 06:32

## 2025-05-23 RX ADMIN — ENTACAPONE 200 MG: 200 TABLET, FILM COATED ORAL at 13:14

## 2025-05-23 RX ADMIN — Medication 9 MG: at 20:55

## 2025-05-23 RX ADMIN — CARBIDOPA AND LEVODOPA 2 TABLET: 25; 100 TABLET ORAL at 09:52

## 2025-05-23 RX ADMIN — ENTACAPONE 200 MG: 200 TABLET, FILM COATED ORAL at 19:02

## 2025-05-23 RX ADMIN — CARBIDOPA AND LEVODOPA 2 TABLET: 25; 100 TABLET ORAL at 13:14

## 2025-05-23 RX ADMIN — ENTACAPONE 200 MG: 200 TABLET, FILM COATED ORAL at 06:33

## 2025-05-23 RX ADMIN — ENTACAPONE 200 MG: 200 TABLET, FILM COATED ORAL at 15:39

## 2025-05-23 RX ADMIN — Medication 9 MG: at 01:00

## 2025-05-23 RX ADMIN — ENTACAPONE 200 MG: 200 TABLET, FILM COATED ORAL at 09:52

## 2025-05-23 RX ADMIN — SODIUM CHLORIDE, PRESERVATIVE FREE 10 ML: 5 INJECTION INTRAVENOUS at 01:02

## 2025-05-23 RX ADMIN — CARBIDOPA AND LEVODOPA 2 TABLET: 25; 100 TABLET ORAL at 15:39

## 2025-05-23 RX ADMIN — CARBIDOPA AND LEVODOPA 2 TABLET: 25; 100 TABLET ORAL at 11:14

## 2025-05-23 RX ADMIN — ENTACAPONE 200 MG: 200 TABLET, FILM COATED ORAL at 11:14

## 2025-05-23 RX ADMIN — HEPARIN SODIUM 5000 UNITS: 5000 INJECTION INTRAVENOUS; SUBCUTANEOUS at 00:58

## 2025-05-23 RX ADMIN — OLANZAPINE 2.5 MG: 5 TABLET, ORALLY DISINTEGRATING ORAL at 19:04

## 2025-05-23 RX ADMIN — OXYCODONE 5 MG: 5 TABLET ORAL at 16:40

## 2025-05-23 RX ADMIN — CARBIDOPA AND LEVODOPA 2 TABLET: 25; 100 TABLET ORAL at 20:56

## 2025-05-23 RX ADMIN — HEPARIN SODIUM 5000 UNITS: 5000 INJECTION INTRAVENOUS; SUBCUTANEOUS at 15:39

## 2025-05-23 RX ADMIN — PAROXETINE HYDROCHLORIDE 60 MG: 20 TABLET, FILM COATED ORAL at 20:56

## 2025-05-23 RX ADMIN — CARBIDOPA AND LEVODOPA 2 TABLET: 25; 100 TABLET ORAL at 19:02

## 2025-05-23 RX ADMIN — LEVOTHYROXINE SODIUM 62.5 MCG: 0.03 TABLET ORAL at 06:32

## 2025-05-23 RX ADMIN — LIOTHYRONINE SODIUM 5 MCG: 5 TABLET ORAL at 09:53

## 2025-05-23 RX ADMIN — SODIUM CHLORIDE, PRESERVATIVE FREE 10 ML: 5 INJECTION INTRAVENOUS at 09:53

## 2025-05-23 RX ADMIN — MIRTAZAPINE 45 MG: 15 TABLET, FILM COATED ORAL at 20:55

## 2025-05-23 RX ADMIN — CARBIDOPA AND LEVODOPA 2 TABLET: 25; 100 TABLET ORAL at 04:42

## 2025-05-23 RX ADMIN — ENTACAPONE 200 MG: 200 TABLET, FILM COATED ORAL at 20:56

## 2025-05-23 RX ADMIN — LIOTHYRONINE SODIUM 5 MCG: 5 TABLET ORAL at 20:55

## 2025-05-23 ASSESSMENT — PAIN DESCRIPTION - ORIENTATION: ORIENTATION: POSTERIOR

## 2025-05-23 ASSESSMENT — PAIN DESCRIPTION - DESCRIPTORS: DESCRIPTORS: ACHING;DISCOMFORT

## 2025-05-23 ASSESSMENT — PAIN SCALES - GENERAL
PAINLEVEL_OUTOF10: 3
PAINLEVEL_OUTOF10: 8

## 2025-05-23 ASSESSMENT — PAIN DESCRIPTION - LOCATION: LOCATION: BACK

## 2025-05-23 ASSESSMENT — PAIN DESCRIPTION - PAIN TYPE: TYPE: ACUTE PAIN

## 2025-05-23 NOTE — ED NOTES
Per ED physician, pt was able to have some water. Pt did cough after drinking. Pt was sat straight up and coughing subsided. Per wife, pt is on slightly thickened liquids.

## 2025-05-23 NOTE — ED PROVIDER NOTES
Past Surgical History:   Procedure Laterality Date    CATARACT EXTRACTION W/ INTRAOCULAR LENS IMPLANT Right     JOINT REPLACEMENT  6-    R knee    NASAL TURBINATE REDUCTION Bilateral     NEUROLOGICAL SURGERY      deep brain stimulator, with remote    OTHER SURGICAL HISTORY      epidural steroid injections     RETINAL DETACHMENT SURGERY Right     THYROIDECTOMY, PARTIAL Left 01/2020    THYROIDECTOMY, PARTIAL Right 10/2019    TOTAL KNEE ARTHROPLASTY Right 2013         CURRENT MEDICATIONS       Previous Medications    ACETAMINOPHEN (TYLENOL) 500 MG TABLET    Take 2 tablets by mouth 3 times daily as needed for Pain    CARBIDOPA-LEVODOPA-ENTACAPONE (STALEVO 200) -200 MG TABS PER TABLET    Take 1 tablet by mouth See Admin Instructions Takes 9 times daily @ 0400, 0700, 0900, 1100, 1300, 1500, 1700, 1900, 2100    CHOLECALCIFEROL 100 MCG (4000 UT) TABS    Take 4,000 Units by mouth daily    DICLOFENAC (VOLTAREN) 75 MG EC TABLET    Take 1 tablet by mouth 2 times daily    GLUCOSAMINE-CHONDROITIN 250-200 MG TABS    Take 2 tablets by mouth in the morning and at bedtime    HYDROXYZINE HCL (ATARAX) 25 MG TABLET    Take 1 tablet by mouth 2 times daily as needed for Anxiety    LEVODOPA (INBRIJA) 42 MG CAPS    Inhale 84 mg into the lungs as needed (off period of parkinson's) 2 tablets (84 mg)    LEVOTHYROXINE (SYNTHROID) 25 MCG TABLET    Take 0.5 tablets by mouth Daily With 50 mcg tab for total of 62.5 mcg daily    LEVOTHYROXINE (SYNTHROID) 50 MCG TABLET    Take 1 tablet by mouth Daily With 1/2 of 12.5 mcg tab for total of 62.5 mcg daily    LIOTHYRONINE (CYTOMEL) 5 MCG TABLET    Take 1 tablet by mouth in the morning and at bedtime    MELATONIN 10 MG TABS    Take 1 tablet by mouth nightly    MIRTAZAPINE (REMERON) 45 MG TABLET    Take 1 tablet by mouth nightly    PAROXETINE (PAXIL) 30 MG TABLET    Take 1 tablet by mouth nightly    POLYETHYLENE GLYCOL (GLYCOLAX) 17 GM/SCOOP POWDER    Take 17 g by mouth daily    RAMELTEON

## 2025-05-23 NOTE — H&P
History & Physical    Primary Care Provider: Shi Ochoa MD  Source of Information: Patient and chart review    History of Presenting Illness:   Fan Vang is a 74 y.o. male with hx of parkinson's dz, hx of throid cancer s/p partial thyroidectomy, anemia, recent admission for pyel and ecoli bacteremia who presented to ed from nursing facility for evaluation of a fall. He reported had an unwitnessed fall and was found with facial bruising. There was also some concern for increased agitation an he was sent to ed for further evaluation and treatment.    The patient denies any fever, chills, chest or abdominal pain, nausea, vomiting, cough, congestion, recent illness, palpitations, or dysuria.  Remarkable vitals on ER Presentation: bp to 181/93  Labs Remarkable for: hgb 8.7, k-3.2  ER Images: ct head, cervical, thoracic and lumbar spine:   ER Rx: none     Review of Systems:  Pertinent items are noted in the History of Present Illness.     Past Medical History:   Diagnosis Date    Arthritis     back and left knee    Dependence on walking stick     bilateral/single hiking stick if walking distance    Fractures left greater trochanter, chronic right ribs 4-7    Gout     Osteoarthritis     Parkinson's disease (HCC)     S/P deep brain stimulator placement     pt has a remote for this device    Thyroid cancer (HCC)     radioactive iodine 4/2020, and partial thyroidectomy    Uses brace     back brace      Past Surgical History:   Procedure Laterality Date    CATARACT EXTRACTION W/ INTRAOCULAR LENS IMPLANT Right     JOINT REPLACEMENT  6-    R knee    NASAL TURBINATE REDUCTION Bilateral     NEUROLOGICAL SURGERY      deep brain stimulator, with remote    OTHER SURGICAL HISTORY      epidural steroid injections     RETINAL DETACHMENT SURGERY Right     THYROIDECTOMY, PARTIAL Left 01/2020    THYROIDECTOMY, PARTIAL Right 10/2019    TOTAL KNEE ARTHROPLASTY Right 2013     Prior to Admission medications    Medication

## 2025-05-23 NOTE — PLAN OF CARE
Problem: Safety - Adult  Goal: Free from fall injury  5/23/2025 1154 by Alla Dawson, RN  Outcome: Progressing  5/23/2025 0230 by Maty Dinh, RN  Outcome: Progressing     Problem: Discharge Planning  Goal: Discharge to home or other facility with appropriate resources  Outcome: Progressing     Problem: Pain  Goal: Verbalizes/displays adequate comfort level or baseline comfort level  Outcome: Progressing

## 2025-05-23 NOTE — ED NOTES
ED TO INPATIENT SBAR HANDOFF    Patient Name: Fan Vang   :  1950  74 y.o.   Preferred Name  fan  Family/Caregiver Present no   Restraints no   C-SSRS: Risk of Suicide: No Risk  Sitter no   Sepsis Risk Score Sepsis V2 Risk Score: 32.9      Situation  Chief Complaint   Patient presents with    Altered Mental Status     Brief Description of Patient's Condition: Patient is a 74-year-old male history of Parkinson's, dementia coming from an acute rehab facility for a fall. He had a fall several hours ago and struck his head. About an hour and a half ago he became acutely agitated and more confused than usual. He was given a dose of Atarax which initially did not work but by the time he got here he was calm and cooperative. Patient is a poor historian due to dementia but does tell me he has no pain and remembers the fall from earlier, stating it was a trip and fall. He denies neck, back, chest, abdominal pain. Denies shortness of breath. Per chart review he does not seem to be on blood thinners. C collar in place. Pt wife had gone home. States he has had \"many\" falls over the last few days.  Mental Status: disoriented  Arrived from: nursing home    Imaging:   CT THORACIC SPINE WO CONTRAST   Final Result         1. Subacute fracture of the posterior right T11 rib      2. Ascending aorta measures 4.9 cm      3. Main pulmonary artery measures 3.5 cm which can be seen with pulmonary artery   hypertension         Electronically signed by Jean Claude Brennan      CT LUMBAR SPINE WO CONTRAST   Final Result      1. Subacute fracture of the posterior right 11th rib. No lumbar fracture.      2. Scoliosis with extensive multilevel degenerative change         Electronically signed by Jean Claude Brennan      XR CHEST PORTABLE   Final Result      No acute process on portable chest.         Electronically signed by David Aburto MD      CT Head W/O Contrast   Final Result   No acute intracranial hemorrhage      C1 and C2 fractures

## 2025-05-24 PROCEDURE — 2500000003 HC RX 250 WO HCPCS: Performed by: STUDENT IN AN ORGANIZED HEALTH CARE EDUCATION/TRAINING PROGRAM

## 2025-05-24 PROCEDURE — 97535 SELF CARE MNGMENT TRAINING: CPT

## 2025-05-24 PROCEDURE — 97167 OT EVAL HIGH COMPLEX 60 MIN: CPT

## 2025-05-24 PROCEDURE — 6360000002 HC RX W HCPCS: Performed by: STUDENT IN AN ORGANIZED HEALTH CARE EDUCATION/TRAINING PROGRAM

## 2025-05-24 PROCEDURE — 6370000000 HC RX 637 (ALT 250 FOR IP): Performed by: STUDENT IN AN ORGANIZED HEALTH CARE EDUCATION/TRAINING PROGRAM

## 2025-05-24 PROCEDURE — 1100000000 HC RM PRIVATE

## 2025-05-24 PROCEDURE — 97162 PT EVAL MOD COMPLEX 30 MIN: CPT | Performed by: PHYSICAL THERAPIST

## 2025-05-24 PROCEDURE — 97116 GAIT TRAINING THERAPY: CPT | Performed by: PHYSICAL THERAPIST

## 2025-05-24 PROCEDURE — 6370000000 HC RX 637 (ALT 250 FOR IP): Performed by: NURSE PRACTITIONER

## 2025-05-24 RX ORDER — HALOPERIDOL 5 MG/ML
2 INJECTION INTRAMUSCULAR EVERY 4 HOURS PRN
Status: DISCONTINUED | OUTPATIENT
Start: 2025-05-24 | End: 2025-05-28 | Stop reason: HOSPADM

## 2025-05-24 RX ADMIN — CARBIDOPA AND LEVODOPA 2 TABLET: 25; 100 TABLET ORAL at 04:05

## 2025-05-24 RX ADMIN — LIOTHYRONINE SODIUM 5 MCG: 5 TABLET ORAL at 21:34

## 2025-05-24 RX ADMIN — CARBIDOPA AND LEVODOPA 2 TABLET: 25; 100 TABLET ORAL at 14:33

## 2025-05-24 RX ADMIN — SODIUM CHLORIDE, PRESERVATIVE FREE 10 ML: 5 INJECTION INTRAVENOUS at 21:39

## 2025-05-24 RX ADMIN — ENTACAPONE 200 MG: 200 TABLET, FILM COATED ORAL at 12:33

## 2025-05-24 RX ADMIN — CARBIDOPA AND LEVODOPA 2 TABLET: 25; 100 TABLET ORAL at 09:30

## 2025-05-24 RX ADMIN — ENTACAPONE 200 MG: 200 TABLET, FILM COATED ORAL at 09:30

## 2025-05-24 RX ADMIN — CARBIDOPA AND LEVODOPA 2 TABLET: 25; 100 TABLET ORAL at 21:34

## 2025-05-24 RX ADMIN — Medication 1 CAPSULE: at 09:30

## 2025-05-24 RX ADMIN — CARBIDOPA AND LEVODOPA 2 TABLET: 25; 100 TABLET ORAL at 12:33

## 2025-05-24 RX ADMIN — CARBIDOPA AND LEVODOPA 2 TABLET: 25; 100 TABLET ORAL at 10:44

## 2025-05-24 RX ADMIN — ACETAMINOPHEN 650 MG: 325 TABLET ORAL at 10:45

## 2025-05-24 RX ADMIN — ENTACAPONE 200 MG: 200 TABLET, FILM COATED ORAL at 10:45

## 2025-05-24 RX ADMIN — LIOTHYRONINE SODIUM 5 MCG: 5 TABLET ORAL at 09:36

## 2025-05-24 RX ADMIN — PAROXETINE HYDROCHLORIDE 30 MG: 20 TABLET, FILM COATED ORAL at 21:35

## 2025-05-24 RX ADMIN — ENTACAPONE 200 MG: 200 TABLET, FILM COATED ORAL at 04:05

## 2025-05-24 RX ADMIN — CARBIDOPA AND LEVODOPA 2 TABLET: 25; 100 TABLET ORAL at 18:45

## 2025-05-24 RX ADMIN — Medication 9 MG: at 21:34

## 2025-05-24 RX ADMIN — SODIUM CHLORIDE, PRESERVATIVE FREE 10 ML: 5 INJECTION INTRAVENOUS at 09:44

## 2025-05-24 RX ADMIN — MIRTAZAPINE 45 MG: 15 TABLET, FILM COATED ORAL at 21:34

## 2025-05-24 RX ADMIN — ENTACAPONE 200 MG: 200 TABLET, FILM COATED ORAL at 16:30

## 2025-05-24 RX ADMIN — ENTACAPONE 200 MG: 200 TABLET, FILM COATED ORAL at 21:34

## 2025-05-24 RX ADMIN — CARBIDOPA AND LEVODOPA 2 TABLET: 25; 100 TABLET ORAL at 16:30

## 2025-05-24 RX ADMIN — ENTACAPONE 200 MG: 200 TABLET, FILM COATED ORAL at 18:45

## 2025-05-24 RX ADMIN — HYDROXYZINE HYDROCHLORIDE 25 MG: 25 TABLET, FILM COATED ORAL at 16:54

## 2025-05-24 RX ADMIN — LEVOTHYROXINE SODIUM 62.5 MCG: 0.03 TABLET ORAL at 09:30

## 2025-05-24 RX ADMIN — FERROUS SULFATE TAB 325 MG (65 MG ELEMENTAL FE) 325 MG: 325 (65 FE) TAB at 09:36

## 2025-05-24 RX ADMIN — ENTACAPONE 200 MG: 200 TABLET, FILM COATED ORAL at 14:33

## 2025-05-24 RX ADMIN — HEPARIN SODIUM 5000 UNITS: 5000 INJECTION INTRAVENOUS; SUBCUTANEOUS at 16:30

## 2025-05-24 ASSESSMENT — PAIN DESCRIPTION - ORIENTATION: ORIENTATION: LOWER

## 2025-05-24 ASSESSMENT — PAIN DESCRIPTION - DESCRIPTORS: DESCRIPTORS: ACHING

## 2025-05-24 ASSESSMENT — PAIN SCALES - GENERAL: PAINLEVEL_OUTOF10: 1

## 2025-05-24 ASSESSMENT — PAIN DESCRIPTION - LOCATION: LOCATION: BACK

## 2025-05-24 NOTE — PLAN OF CARE
Problem: Physical Therapy - Adult  Goal: By Discharge: Performs mobility at highest level of function for planned discharge setting.  See evaluation for individualized goals.  Description: FUNCTIONAL STATUS PRIOR TO ADMISSION: The patient was in AL and said he used a RW    HOME SUPPORT PRIOR TO ADMISSION: AL    Physical Therapy Goals  Initiated 5/24/2025  1.  Patient will move from supine to sit and sit to supine , scoot up and down, and roll side to side in bed with minimal assistance/contact guard assist within 7 day(s).    2.  Patient will transfer from bed to chair and chair to bed with minimal assistance/contact guard assist using the least restrictive device within 7 day(s).  3.  Patient will perform sit to stand with minimal assistance/contact guard assist within 7 day(s).  4.  Patient will ambulate with minimal assistance/contact guard assist for 50 feet with the least restrictive device within 7 day(s).     Outcome: Progressing   PHYSICAL THERAPY EVALUATION    Patient: Fan Vang (74 y.o. male)  Date: 5/24/2025  Primary Diagnosis: Closed head injury, initial encounter [S09.90XA]  Fall, initial encounter [W19.XXXA]  Closed fracture dislocation of cervical spine, sequela [S12.9XXS]  Closed displaced fracture of first cervical vertebra, unspecified fracture morphology, initial encounter ([facility]) [S12.000A]  Closed odontoid fracture with type II morphology and anterior displacement, initial encounter ([facility]) [S12.110A]  Anterior displaced Type II dens fracture, init for clos fx ([facility]) [S12.110A]       Precautions: Restrictions/Precautions  Restrictions/Precautions: Fall Risk, Bed Alarm, Skin  Required Braces or Orthoses?: Yes            ASSESSMENT :   DEFICITS/IMPAIRMENTS:   The patient is limited by decreased functional mobility, activity tolerance, endurance, safety awareness, balance and today he is awake in the bed and alert and says yes to getting up.  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

## 2025-05-24 NOTE — CARE COORDINATION
Care Management Initial Assessment       RUR:23%  Readmission? Yes -   1st IM letter given? Yes - 5/24/25  1st  letter given: N/A    Patient unable to participate in a discussion due to memory loss. CM met with wife Rianna at the bedside.    Patient was discharged from [facility] 5/14/25 to [facility] for IV antibiotics and rehab. The IV antibiotics are now completed.  Patient was re-admitted to [facility] 5/22/25 following fall with fractured cervical vertebrae. No current plan for surgery. Tentative plan is for return to [facility]. CM sent referral via [facility] to [facility], and response is pending.    Wife said that, following SNF rehab, plan is to place patient in Long Term Care. Patient will initially be private pay until he meets the criteria for Long Term Medicaid which wife estimates to be 1 to 2 months.  CM suggested contacting [facility] to assist with placement. CM also suggested speaking with the unit based CM after the long holiday weekend on Tuesday 5/27/25 to possibly place multiple referrals via [employer] to potential LTC providers.    Transition of Care Plan:    RUR: 23%  Prior Level of Functioning: SNF rehab/ dependent for all ADLs  Disposition: Return to [facility]  MILES: 5/27/25  If SNF or IPR: Date FOC offered: N/A  Date FOC received:   Accepting facility:   Date authorization started with reference number:   Date authorization received and expires:   Follow up appointments:   DME needed: None  Transportation at discharge: BLS  IM/IMM Medicare/ letter given: 1st 5/24/25  Is patient a [place] and connected with [facility]?    If yes, was [place] transfer form completed and VA notified?   Caregiver Contact: spouse Rianna Vang  Discharge Caregiver contacted prior to discharge?   Care Conference needed?   Barriers to discharge:     Readmission Assessment  Number of Days since last admission?: 8-30 days  Previous Disposition: SNF  Who is being Interviewed: Caregiver (spouse

## 2025-05-24 NOTE — PLAN OF CARE
Problem: Occupational Therapy - Adult  Goal: By Discharge: Performs self-care activities at highest level of function for planned discharge setting.  See evaluation for individualized goals.  Description: FUNCTIONAL STATUS PRIOR TO ADMISSION:  Patient resided in [facility]? Per chart (chart states several different levels PTA). He is unable to provided any PLOF. He fell from w/c and sustained a C1-2 fx which neurosurgery states no surgery is recommended. He is not a reliable historian.   Receives Help From: Personal care attendant,  ,  ,  ,  ,  ,  ,  ,  ,  ,              HOME SUPPORT: Lives at [facility] per chart     Occupational Therapy Goals  Initiated 5/24/2025    1.  Patient will perform static standing for 3:00 with CGA for to decrease burden of care for staff at  level or grab bar within 7 days.  2.  Patient will perform upper body anterior bathing  with Stand by Assist within 7 days.   3. Patient will ivan shirt with mod A and cues PRN within 7 days.  4.  Patient will standing ADLs 5 mins at Contact Guard Assist within 7 days.   5.  Patient will comply with wearingneck brace 100% of time within 7 days.    Outcome: Progressing    OCCUPATIONAL THERAPY EVALUATION    Patient: Fan Vang (74 y.o. male)  Date: 5/24/2025  Primary Diagnosis: Closed head injury, initial encounter [S09.90XA]  Fall, initial encounter [W19.XXXA]  Closed fracture dislocation of cervical spine, sequela [S12.9XXS]  Closed displaced fracture of first cervical vertebra, unspecified fracture morphology, initial encounter ([facility]) [S12.000A]  Closed odontoid fracture with type II morphology and anterior displacement, initial encounter ([facility]) [S12.110A]  Anterior displaced Type II dens fracture, init for clos fx ([facility]) [S12.110A]         Precautions: Fall Risk, Bed Alarm, Skin                  ASSESSMENT :  The patient is limited by decreased functional mobility, independence in ADLs, ROM, body mechanics, activity tolerance, endurance, safety awareness,

## 2025-05-25 PROCEDURE — 6370000000 HC RX 637 (ALT 250 FOR IP): Performed by: STUDENT IN AN ORGANIZED HEALTH CARE EDUCATION/TRAINING PROGRAM

## 2025-05-25 PROCEDURE — 6360000002 HC RX W HCPCS: Performed by: STUDENT IN AN ORGANIZED HEALTH CARE EDUCATION/TRAINING PROGRAM

## 2025-05-25 PROCEDURE — 6360000002 HC RX W HCPCS: Performed by: HOSPITALIST

## 2025-05-25 PROCEDURE — 1100000000 HC RM PRIVATE

## 2025-05-25 PROCEDURE — 6370000000 HC RX 637 (ALT 250 FOR IP): Performed by: NURSE PRACTITIONER

## 2025-05-25 PROCEDURE — 2500000003 HC RX 250 WO HCPCS: Performed by: STUDENT IN AN ORGANIZED HEALTH CARE EDUCATION/TRAINING PROGRAM

## 2025-05-25 RX ADMIN — LIOTHYRONINE SODIUM 5 MCG: 5 TABLET ORAL at 20:47

## 2025-05-25 RX ADMIN — ENTACAPONE 200 MG: 200 TABLET, FILM COATED ORAL at 15:06

## 2025-05-25 RX ADMIN — ENTACAPONE 200 MG: 200 TABLET, FILM COATED ORAL at 18:38

## 2025-05-25 RX ADMIN — HEPARIN SODIUM 5000 UNITS: 5000 INJECTION INTRAVENOUS; SUBCUTANEOUS at 09:22

## 2025-05-25 RX ADMIN — LIOTHYRONINE SODIUM 5 MCG: 5 TABLET ORAL at 09:22

## 2025-05-25 RX ADMIN — HEPARIN SODIUM 5000 UNITS: 5000 INJECTION INTRAVENOUS; SUBCUTANEOUS at 17:06

## 2025-05-25 RX ADMIN — ENTACAPONE 200 MG: 200 TABLET, FILM COATED ORAL at 04:12

## 2025-05-25 RX ADMIN — HALOPERIDOL LACTATE 2 MG: 5 INJECTION, SOLUTION INTRAMUSCULAR at 22:20

## 2025-05-25 RX ADMIN — MIRTAZAPINE 45 MG: 15 TABLET, FILM COATED ORAL at 20:46

## 2025-05-25 RX ADMIN — ENTACAPONE 200 MG: 200 TABLET, FILM COATED ORAL at 09:22

## 2025-05-25 RX ADMIN — CARBIDOPA AND LEVODOPA 2 TABLET: 25; 100 TABLET ORAL at 09:22

## 2025-05-25 RX ADMIN — ENTACAPONE 200 MG: 200 TABLET, FILM COATED ORAL at 13:22

## 2025-05-25 RX ADMIN — CARBIDOPA AND LEVODOPA 2 TABLET: 25; 100 TABLET ORAL at 13:22

## 2025-05-25 RX ADMIN — PAROXETINE HYDROCHLORIDE 30 MG: 20 TABLET, FILM COATED ORAL at 20:46

## 2025-05-25 RX ADMIN — SODIUM CHLORIDE, PRESERVATIVE FREE 10 ML: 5 INJECTION INTRAVENOUS at 09:22

## 2025-05-25 RX ADMIN — ENTACAPONE 200 MG: 200 TABLET, FILM COATED ORAL at 20:47

## 2025-05-25 RX ADMIN — CARBIDOPA AND LEVODOPA 2 TABLET: 25; 100 TABLET ORAL at 11:16

## 2025-05-25 RX ADMIN — CARBIDOPA AND LEVODOPA 2 TABLET: 25; 100 TABLET ORAL at 04:12

## 2025-05-25 RX ADMIN — FERROUS SULFATE TAB 325 MG (65 MG ELEMENTAL FE) 325 MG: 325 (65 FE) TAB at 09:23

## 2025-05-25 RX ADMIN — CARBIDOPA AND LEVODOPA 2 TABLET: 25; 100 TABLET ORAL at 17:03

## 2025-05-25 RX ADMIN — Medication 1 CAPSULE: at 09:22

## 2025-05-25 RX ADMIN — HYDROXYZINE HYDROCHLORIDE 25 MG: 25 TABLET, FILM COATED ORAL at 11:16

## 2025-05-25 RX ADMIN — ENTACAPONE 200 MG: 200 TABLET, FILM COATED ORAL at 11:16

## 2025-05-25 RX ADMIN — CARBIDOPA AND LEVODOPA 2 TABLET: 25; 100 TABLET ORAL at 15:06

## 2025-05-25 RX ADMIN — ENTACAPONE 200 MG: 200 TABLET, FILM COATED ORAL at 17:03

## 2025-05-25 RX ADMIN — CARBIDOPA AND LEVODOPA 2 TABLET: 25; 100 TABLET ORAL at 18:38

## 2025-05-25 RX ADMIN — Medication 9 MG: at 20:47

## 2025-05-25 RX ADMIN — CARBIDOPA AND LEVODOPA 2 TABLET: 25; 100 TABLET ORAL at 20:47

## 2025-05-25 ASSESSMENT — PAIN DESCRIPTION - DESCRIPTORS: DESCRIPTORS: ACHING

## 2025-05-25 ASSESSMENT — PAIN DESCRIPTION - LOCATION: LOCATION: BACK

## 2025-05-25 NOTE — PLAN OF CARE
Problem: Safety - Adult  Goal: Free from fall injury  Outcome: Progressing     Problem: Discharge Planning  Goal: Discharge to home or other facility with appropriate resources  Outcome: Progressing     Problem: Pain  Goal: Verbalizes/displays adequate comfort level or baseline comfort level  Outcome: Progressing     Problem: Skin/Tissue Integrity  Goal: Skin integrity remains intact  Description: 1.  Monitor for areas of redness and/or skin breakdown2.  Assess vascular access sites hourly3.  Every 4-6 hours minimum:  Change oxygen saturation probe site4.  Every 4-6 hours:  If on nasal continuous positive airway pressure, respiratory therapy assess nares and determine need for appliance change or resting period  Outcome: Progressing

## 2025-05-26 LAB
ANION GAP SERPL CALC-SCNC: 5 MMOL/L (ref 2–12)
BASOPHILS # BLD: 0.09 K/UL (ref 0–0.1)
BASOPHILS NFR BLD: 1.8 % (ref 0–1)
BUN SERPL-MCNC: 6 MG/DL (ref 6–20)
BUN/CREAT SERPL: 9 (ref 12–20)
CALCIUM SERPL-MCNC: 9.2 MG/DL (ref 8.5–10.1)
CHLORIDE SERPL-SCNC: 103 MMOL/L (ref 97–108)
CO2 SERPL-SCNC: 30 MMOL/L (ref 21–32)
CREAT SERPL-MCNC: 0.69 MG/DL (ref 0.7–1.3)
DIFFERENTIAL METHOD BLD: ABNORMAL
EOSINOPHIL # BLD: 0.22 K/UL (ref 0–0.4)
EOSINOPHIL NFR BLD: 4.3 % (ref 0–7)
ERYTHROCYTE [DISTWIDTH] IN BLOOD BY AUTOMATED COUNT: 15.7 % (ref 11.5–14.5)
GLUCOSE SERPL-MCNC: 82 MG/DL (ref 65–100)
HCT VFR BLD AUTO: 30.1 % (ref 36.6–50.3)
HGB BLD-MCNC: 9.9 G/DL (ref 12.1–17)
IMM GRANULOCYTES # BLD AUTO: 0.01 K/UL (ref 0–0.04)
IMM GRANULOCYTES NFR BLD AUTO: 0.2 % (ref 0–0.5)
LYMPHOCYTES # BLD: 0.61 K/UL (ref 0.8–3.5)
LYMPHOCYTES NFR BLD: 11.9 % (ref 12–49)
MCH RBC QN AUTO: 31.3 PG (ref 26–34)
MCHC RBC AUTO-ENTMCNC: 32.9 G/DL (ref 30–36.5)
MCV RBC AUTO: 95.3 FL (ref 80–99)
MONOCYTES # BLD: 0.29 K/UL (ref 0–1)
MONOCYTES NFR BLD: 5.7 % (ref 5–13)
NEUTS SEG # BLD: 3.88 K/UL (ref 1.8–8)
NEUTS SEG NFR BLD: 76.1 % (ref 32–75)
NRBC # BLD: 0 K/UL (ref 0–0.01)
NRBC BLD-RTO: 0 PER 100 WBC
PLATELET # BLD AUTO: 333 K/UL (ref 150–400)
PMV BLD AUTO: 9.2 FL (ref 8.9–12.9)
POTASSIUM SERPL-SCNC: 3.4 MMOL/L (ref 3.5–5.1)
RBC # BLD AUTO: 3.16 M/UL (ref 4.1–5.7)
RBC MORPH BLD: ABNORMAL
RBC MORPH BLD: ABNORMAL
SODIUM SERPL-SCNC: 138 MMOL/L (ref 136–145)
WBC # BLD AUTO: 5.1 K/UL (ref 4.1–11.1)

## 2025-05-26 PROCEDURE — 6370000000 HC RX 637 (ALT 250 FOR IP): Performed by: NURSE PRACTITIONER

## 2025-05-26 PROCEDURE — 6360000002 HC RX W HCPCS: Performed by: STUDENT IN AN ORGANIZED HEALTH CARE EDUCATION/TRAINING PROGRAM

## 2025-05-26 PROCEDURE — 2500000003 HC RX 250 WO HCPCS: Performed by: STUDENT IN AN ORGANIZED HEALTH CARE EDUCATION/TRAINING PROGRAM

## 2025-05-26 PROCEDURE — 85025 COMPLETE CBC W/AUTO DIFF WBC: CPT

## 2025-05-26 PROCEDURE — 6370000000 HC RX 637 (ALT 250 FOR IP): Performed by: STUDENT IN AN ORGANIZED HEALTH CARE EDUCATION/TRAINING PROGRAM

## 2025-05-26 PROCEDURE — 1100000000 HC RM PRIVATE

## 2025-05-26 PROCEDURE — 80048 BASIC METABOLIC PNL TOTAL CA: CPT

## 2025-05-26 RX ADMIN — Medication 9 MG: at 21:32

## 2025-05-26 RX ADMIN — CARBIDOPA AND LEVODOPA 2 TABLET: 25; 100 TABLET ORAL at 15:10

## 2025-05-26 RX ADMIN — CARBIDOPA AND LEVODOPA 2 TABLET: 25; 100 TABLET ORAL at 19:14

## 2025-05-26 RX ADMIN — ENTACAPONE 200 MG: 200 TABLET, FILM COATED ORAL at 15:10

## 2025-05-26 RX ADMIN — SODIUM CHLORIDE, PRESERVATIVE FREE 10 ML: 5 INJECTION INTRAVENOUS at 21:33

## 2025-05-26 RX ADMIN — HYDROXYZINE HYDROCHLORIDE 25 MG: 25 TABLET, FILM COATED ORAL at 07:31

## 2025-05-26 RX ADMIN — ENTACAPONE 200 MG: 200 TABLET, FILM COATED ORAL at 11:21

## 2025-05-26 RX ADMIN — LEVOTHYROXINE SODIUM 62.5 MCG: 0.03 TABLET ORAL at 07:31

## 2025-05-26 RX ADMIN — ENTACAPONE 200 MG: 200 TABLET, FILM COATED ORAL at 19:14

## 2025-05-26 RX ADMIN — PAROXETINE HYDROCHLORIDE 30 MG: 20 TABLET, FILM COATED ORAL at 21:32

## 2025-05-26 RX ADMIN — CARBIDOPA AND LEVODOPA 2 TABLET: 25; 100 TABLET ORAL at 21:32

## 2025-05-26 RX ADMIN — CARBIDOPA AND LEVODOPA 2 TABLET: 25; 100 TABLET ORAL at 11:22

## 2025-05-26 RX ADMIN — Medication 1 CAPSULE: at 07:32

## 2025-05-26 RX ADMIN — ENTACAPONE 200 MG: 200 TABLET, FILM COATED ORAL at 07:31

## 2025-05-26 RX ADMIN — CARBIDOPA AND LEVODOPA 2 TABLET: 25; 100 TABLET ORAL at 17:40

## 2025-05-26 RX ADMIN — CARBIDOPA AND LEVODOPA 2 TABLET: 25; 100 TABLET ORAL at 07:32

## 2025-05-26 RX ADMIN — ENTACAPONE 200 MG: 200 TABLET, FILM COATED ORAL at 17:40

## 2025-05-26 RX ADMIN — ENTACAPONE 200 MG: 200 TABLET, FILM COATED ORAL at 21:32

## 2025-05-26 RX ADMIN — MIRTAZAPINE 45 MG: 15 TABLET, FILM COATED ORAL at 21:32

## 2025-05-26 RX ADMIN — CARBIDOPA AND LEVODOPA 2 TABLET: 25; 100 TABLET ORAL at 13:12

## 2025-05-26 RX ADMIN — HEPARIN SODIUM 5000 UNITS: 5000 INJECTION INTRAVENOUS; SUBCUTANEOUS at 15:51

## 2025-05-26 RX ADMIN — ENTACAPONE 200 MG: 200 TABLET, FILM COATED ORAL at 04:30

## 2025-05-26 RX ADMIN — CARBIDOPA AND LEVODOPA 2 TABLET: 25; 100 TABLET ORAL at 04:29

## 2025-05-26 RX ADMIN — SODIUM CHLORIDE, PRESERVATIVE FREE 10 ML: 5 INJECTION INTRAVENOUS at 09:24

## 2025-05-26 RX ADMIN — ENTACAPONE 200 MG: 200 TABLET, FILM COATED ORAL at 13:12

## 2025-05-26 RX ADMIN — LIOTHYRONINE SODIUM 5 MCG: 5 TABLET ORAL at 21:31

## 2025-05-26 ASSESSMENT — PAIN SCALES - WONG BAKER
WONGBAKER_NUMERICALRESPONSE: NO HURT
WONGBAKER_NUMERICALRESPONSE: HURTS LITTLE MORE

## 2025-05-26 ASSESSMENT — PAIN SCALES - GENERAL: PAINLEVEL_OUTOF10: 0

## 2025-05-27 LAB
ANION GAP SERPL CALC-SCNC: 2 MMOL/L (ref 2–12)
BUN SERPL-MCNC: 9 MG/DL (ref 6–20)
BUN/CREAT SERPL: 12 (ref 12–20)
CALCIUM SERPL-MCNC: 9 MG/DL (ref 8.5–10.1)
CHLORIDE SERPL-SCNC: 102 MMOL/L (ref 97–108)
CO2 SERPL-SCNC: 32 MMOL/L (ref 21–32)
CREAT SERPL-MCNC: 0.74 MG/DL (ref 0.7–1.3)
GLUCOSE SERPL-MCNC: 99 MG/DL (ref 65–100)
POTASSIUM SERPL-SCNC: 3.4 MMOL/L (ref 3.5–5.1)
SODIUM SERPL-SCNC: 136 MMOL/L (ref 136–145)

## 2025-05-27 PROCEDURE — 6360000002 HC RX W HCPCS: Performed by: STUDENT IN AN ORGANIZED HEALTH CARE EDUCATION/TRAINING PROGRAM

## 2025-05-27 PROCEDURE — 6370000000 HC RX 637 (ALT 250 FOR IP): Performed by: NURSE PRACTITIONER

## 2025-05-27 PROCEDURE — 2500000003 HC RX 250 WO HCPCS: Performed by: STUDENT IN AN ORGANIZED HEALTH CARE EDUCATION/TRAINING PROGRAM

## 2025-05-27 PROCEDURE — 80048 BASIC METABOLIC PNL TOTAL CA: CPT

## 2025-05-27 PROCEDURE — 6370000000 HC RX 637 (ALT 250 FOR IP): Performed by: STUDENT IN AN ORGANIZED HEALTH CARE EDUCATION/TRAINING PROGRAM

## 2025-05-27 PROCEDURE — 1100000000 HC RM PRIVATE

## 2025-05-27 PROCEDURE — 6360000002 HC RX W HCPCS: Performed by: HOSPITALIST

## 2025-05-27 RX ORDER — HYDRALAZINE HYDROCHLORIDE 20 MG/ML
10 INJECTION INTRAMUSCULAR; INTRAVENOUS EVERY 6 HOURS PRN
Status: DISCONTINUED | OUTPATIENT
Start: 2025-05-27 | End: 2025-05-28 | Stop reason: HOSPADM

## 2025-05-27 RX ORDER — POTASSIUM CHLORIDE 750 MG/1
20 TABLET, EXTENDED RELEASE ORAL ONCE
Status: COMPLETED | OUTPATIENT
Start: 2025-05-27 | End: 2025-05-27

## 2025-05-27 RX ADMIN — ENTACAPONE 200 MG: 200 TABLET, FILM COATED ORAL at 10:51

## 2025-05-27 RX ADMIN — HEPARIN SODIUM 5000 UNITS: 5000 INJECTION INTRAVENOUS; SUBCUTANEOUS at 07:41

## 2025-05-27 RX ADMIN — LIOTHYRONINE SODIUM 5 MCG: 5 TABLET ORAL at 21:14

## 2025-05-27 RX ADMIN — ENTACAPONE 200 MG: 200 TABLET, FILM COATED ORAL at 15:25

## 2025-05-27 RX ADMIN — CARBIDOPA AND LEVODOPA 2 TABLET: 25; 100 TABLET ORAL at 15:25

## 2025-05-27 RX ADMIN — CARBIDOPA AND LEVODOPA 2 TABLET: 25; 100 TABLET ORAL at 17:12

## 2025-05-27 RX ADMIN — ENTACAPONE 200 MG: 200 TABLET, FILM COATED ORAL at 08:09

## 2025-05-27 RX ADMIN — CARBIDOPA AND LEVODOPA 2 TABLET: 25; 100 TABLET ORAL at 13:01

## 2025-05-27 RX ADMIN — ENTACAPONE 200 MG: 200 TABLET, FILM COATED ORAL at 21:14

## 2025-05-27 RX ADMIN — FERROUS SULFATE TAB 325 MG (65 MG ELEMENTAL FE) 325 MG: 325 (65 FE) TAB at 08:09

## 2025-05-27 RX ADMIN — CARBIDOPA AND LEVODOPA 2 TABLET: 25; 100 TABLET ORAL at 18:45

## 2025-05-27 RX ADMIN — POTASSIUM CHLORIDE 20 MEQ: 750 TABLET, FILM COATED, EXTENDED RELEASE ORAL at 10:51

## 2025-05-27 RX ADMIN — ENTACAPONE 200 MG: 200 TABLET, FILM COATED ORAL at 06:21

## 2025-05-27 RX ADMIN — CARBIDOPA AND LEVODOPA 2 TABLET: 25; 100 TABLET ORAL at 10:51

## 2025-05-27 RX ADMIN — LEVOTHYROXINE SODIUM 62.5 MCG: 0.03 TABLET ORAL at 06:21

## 2025-05-27 RX ADMIN — HEPARIN SODIUM 5000 UNITS: 5000 INJECTION INTRAVENOUS; SUBCUTANEOUS at 00:56

## 2025-05-27 RX ADMIN — ENTACAPONE 200 MG: 200 TABLET, FILM COATED ORAL at 18:45

## 2025-05-27 RX ADMIN — HEPARIN SODIUM 5000 UNITS: 5000 INJECTION INTRAVENOUS; SUBCUTANEOUS at 15:26

## 2025-05-27 RX ADMIN — LIOTHYRONINE SODIUM 5 MCG: 5 TABLET ORAL at 08:09

## 2025-05-27 RX ADMIN — ENTACAPONE 200 MG: 200 TABLET, FILM COATED ORAL at 13:01

## 2025-05-27 RX ADMIN — CARBIDOPA AND LEVODOPA 2 TABLET: 25; 100 TABLET ORAL at 03:54

## 2025-05-27 RX ADMIN — CARBIDOPA AND LEVODOPA 2 TABLET: 25; 100 TABLET ORAL at 06:22

## 2025-05-27 RX ADMIN — CARBIDOPA AND LEVODOPA 2 TABLET: 25; 100 TABLET ORAL at 08:09

## 2025-05-27 RX ADMIN — Medication 1 CAPSULE: at 08:09

## 2025-05-27 RX ADMIN — ENTACAPONE 200 MG: 200 TABLET, FILM COATED ORAL at 17:11

## 2025-05-27 RX ADMIN — NITROGLYCERIN 1 INCH: 20 OINTMENT TOPICAL at 11:42

## 2025-05-27 RX ADMIN — ENTACAPONE 200 MG: 200 TABLET, FILM COATED ORAL at 03:54

## 2025-05-27 RX ADMIN — Medication 9 MG: at 21:14

## 2025-05-27 RX ADMIN — CARBIDOPA AND LEVODOPA 2 TABLET: 25; 100 TABLET ORAL at 21:14

## 2025-05-27 RX ADMIN — PAROXETINE HYDROCHLORIDE 30 MG: 20 TABLET, FILM COATED ORAL at 21:14

## 2025-05-27 RX ADMIN — SODIUM CHLORIDE, PRESERVATIVE FREE 10 ML: 5 INJECTION INTRAVENOUS at 21:15

## 2025-05-27 RX ADMIN — MIRTAZAPINE 45 MG: 15 TABLET, FILM COATED ORAL at 21:14

## 2025-05-27 RX ADMIN — HALOPERIDOL LACTATE 2 MG: 5 INJECTION, SOLUTION INTRAMUSCULAR at 04:18

## 2025-05-27 ASSESSMENT — PAIN SCALES - GENERAL: PAINLEVEL_OUTOF10: 0

## 2025-05-27 NOTE — PLAN OF CARE
Problem: Pain  Goal: Verbalizes/displays adequate comfort level or baseline comfort level  5/26/2025 1047 by Courtney Garcia, RN  Outcome: Progressing

## 2025-05-27 NOTE — PLAN OF CARE
Problem: Safety - Adult  Goal: Free from fall injury  5/27/2025 1148 by Flaquito Birch, RN  Outcome: Progressing  5/27/2025 0011 by Maty Dinh, RN  Outcome: Progressing

## 2025-05-27 NOTE — CARE COORDINATION
Transition of Care Plan:    RUR: 23%  Prior Level of Functioning: SNF rehab/ dependent for all ADLs  Disposition: NF \"LTC w/ Hospice  MILES: 5/27/25  If SNF or IPR: Date FOC offered: N/A  Date FOC received:   Accepted Facility:  [facility]  Date authorization started with reference number: N/A   Date authorization received and expires: N/A   Follow up appointments: PCP  DME needed: None  Transportation at discharge: BLS  IM/IMM Medicare/ letter given: 1st 5/24/25, 2nd IMM Letter Needed   Caregiver Contact: spouse Rianna Vang  Discharge Caregiver contacted prior to discharge? N  Care Conference needed? N  Barriers to discharge: medical stability, bed availability     4:12pm    Per conversation with the pt 's spouse; this cm informed [facility]. She reported that she wanted to proceed with [facility] at this time as she was able to tour the facility. This cm informed her that he would follow up with the liaison and prompt the financial office to contact her to review over the OOP expense.      4:00p  Per conversation with the spouse; She reported that she was interested in transferring the pt to a different facility as [facility] is unable to provide a Medicaid bed w/ Hospice for the pt. She reported that she was interested in a referral being sent to [facility], [facility] and [facility]. She reported that she is able to pay privately as the pt does not have a payor source for LTC. This cm informed her that he would provide an update once available.              Previous CM Note:       Patient unable to participate in a discussion due to memory loss. CM met with wife Rianna at the bedside.    Patient was discharged from [facility] 5/14/25 to [facility] for IV antibiotics and rehab. The IV antibiotics are now completed.  Patient was re-admitted to [facility] 5/22/25 following fall with fractured cervical vertebrae. No current plan for surgery. Tentative plan is

## 2025-05-28 VITALS
HEART RATE: 68 BPM | DIASTOLIC BLOOD PRESSURE: 90 MMHG | OXYGEN SATURATION: 92 % | HEIGHT: 70 IN | SYSTOLIC BLOOD PRESSURE: 131 MMHG | RESPIRATION RATE: 16 BRPM | WEIGHT: 178.35 LBS | BODY MASS INDEX: 25.53 KG/M2 | TEMPERATURE: 98.1 F

## 2025-05-28 PROCEDURE — 6360000002 HC RX W HCPCS: Performed by: HOSPITALIST

## 2025-05-28 PROCEDURE — 6370000000 HC RX 637 (ALT 250 FOR IP): Performed by: STUDENT IN AN ORGANIZED HEALTH CARE EDUCATION/TRAINING PROGRAM

## 2025-05-28 PROCEDURE — 6360000002 HC RX W HCPCS: Performed by: STUDENT IN AN ORGANIZED HEALTH CARE EDUCATION/TRAINING PROGRAM

## 2025-05-28 PROCEDURE — 6370000000 HC RX 637 (ALT 250 FOR IP): Performed by: NURSE PRACTITIONER

## 2025-05-28 RX ORDER — ENTACAPONE 200 MG/1
200 TABLET ORAL SEE ADMIN INSTRUCTIONS
Qty: 90 TABLET | Refills: 3 | Status: SHIPPED
Start: 2025-05-28

## 2025-05-28 RX ORDER — FERROUS SULFATE 325(65) MG
325 TABLET ORAL DAILY
Qty: 30 TABLET | Refills: 3 | Status: SHIPPED
Start: 2025-05-29

## 2025-05-28 RX ORDER — OLANZAPINE 5 MG/1
2.5 TABLET, ORALLY DISINTEGRATING ORAL 2 TIMES DAILY PRN
Qty: 30 TABLET | Refills: 3 | Status: SHIPPED
Start: 2025-05-28

## 2025-05-28 RX ADMIN — CARBIDOPA AND LEVODOPA 2 TABLET: 25; 100 TABLET ORAL at 04:00

## 2025-05-28 RX ADMIN — HEPARIN SODIUM 5000 UNITS: 5000 INJECTION INTRAVENOUS; SUBCUTANEOUS at 06:38

## 2025-05-28 RX ADMIN — CARBIDOPA AND LEVODOPA 2 TABLET: 25; 100 TABLET ORAL at 06:37

## 2025-05-28 RX ADMIN — ENTACAPONE 200 MG: 200 TABLET, FILM COATED ORAL at 04:01

## 2025-05-28 RX ADMIN — ENTACAPONE 200 MG: 200 TABLET, FILM COATED ORAL at 06:37

## 2025-05-28 RX ADMIN — HEPARIN SODIUM 5000 UNITS: 5000 INJECTION INTRAVENOUS; SUBCUTANEOUS at 00:23

## 2025-05-28 RX ADMIN — ENTACAPONE 200 MG: 200 TABLET, FILM COATED ORAL at 14:46

## 2025-05-28 RX ADMIN — LEVOTHYROXINE SODIUM 62.5 MCG: 0.03 TABLET ORAL at 06:37

## 2025-05-28 RX ADMIN — HALOPERIDOL LACTATE 2 MG: 5 INJECTION, SOLUTION INTRAMUSCULAR at 00:23

## 2025-05-28 RX ADMIN — CARBIDOPA AND LEVODOPA 2 TABLET: 25; 100 TABLET ORAL at 14:45

## 2025-05-28 NOTE — CARE COORDINATION
Transition of Care Plan:    RUR: 23%  Prior Level of Functioning: SNF rehab/ dependent for all ADLs  Disposition: NF \"LTC w/ Hospice  MILES: 5/27/25  If SNF or IPR: Date FOC offered: N/A  Date FOC received:   Accepted Facility: FAMILY HAS OPTED FOR THE PT TO RETURN TO [facility] THAN TRANSFER TO [facility]   REPORT#[phone] RM#[id number]  Date authorization started with reference number: N/A   Date authorization received and expires: N/A   Follow up appointments: PCP  DME needed: None  Transportation at discharge: BLS/H2H 1430  IM/IMM Medicare/ letter given: RECEIVED   Caregiver Contact: Spouse Rianna Vang  Discharge Caregiver contacted prior to discharge? Y  Care Conference needed? N      Transition of Care Plan to SNF/Rehab    Communication to Patient/Family:  Met with patient and family and they are agreeable to the transition plan. The Plan for Transition of Care is related to the following treatment goals: SNF     The Patient and/or patient representative was provided with a choice of provider and agrees  with the discharge plan.      Yes [x] No []    A Freedom of choice list was provided with basic dialogue that supports the patient's individualized plan of care/goals and shares the quality data associated with the providers.       Yes [x] No []    SNF/Rehab Transition:  Patient has been accepted to [facility] RM[facility][id number] SNF/Rehab and meets criteria for admission.   Date of Inpatient Status Order:   Patient will transported by H2H/BLS and expected to leave at 1430 .    Communication to SNF/Rehab:  Bedside RN, FRANCISCO, has been notified to update the transition plan to the facility and call report (phone number).  Discharge information has been updated on the AVS. And communicated to facility via [email or web address], or CC link.     Discharge instructions to be fax'd to facility at (Fax #).     Nursing Please include all hard scripts for controlled substances, med rec and dc

## 2025-05-28 NOTE — DISCHARGE SUMMARY
Discharge Summary       PATIENT ID: Fan Vang  MRN: [id number]   YOB: 1950    DATE OF ADMISSION: 5/22/2025  7:42 PM    DATE OF DISCHARGE: 5/28/2025  PRIMARY CARE PROVIDER: Shi Ochoa MD     ATTENDING PHYSICIAN: Dr. Mejia  DISCHARGING PROVIDER: SOFYA Boyd NP    To contact this individual call [phone] and ask the  to page.  If unavailable ask to be transferred the Adult Hospitalist Department.    CONSULTATIONS: IP CONSULT TO NEUROSURGERY  IP CONSULT TO NEUROSURGERY    PROCEDURES/SURGERIES: * No surgery found *    ADMITTING DIAGNOSES & HOSPITAL COURSE:   \"Fan Vang is a 74 y.o. male with hx of parkinson's dz, hx of throid cancer s/p partial thyroidectomy, anemia, recent admission for pyel and ecoli bacteremia who presented to ed from nursing facility for evaluation of a fall. He reported had an unwitnessed fall and was found with facial bruising. There was also some concern for increased agitation an he was sent to ed for further evaluation and treatment.     The patient denies any fever, chills, chest or abdominal pain, nausea, vomiting, cough, congestion, recent illness, palpitations, or dysuria.  

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
## 2025-05-28 NOTE — PLAN OF CARE
Problem: Pain  Goal: Verbalizes/displays adequate comfort level or baseline comfort level  5/27/2025 2327 by Maty Dinh, RN  Outcome: Progressing  5/27/2025 1148 by Flaquito Birch, RN  Outcome: Progressing

## (undated) DEVICE — Device: Brand: JELCO

## (undated) DEVICE — TOWEL SURG W17XL27IN STD BLU COT NONFENESTRATED PREWASHED

## (undated) DEVICE — PAD GROUNDING ADULT UNCORDED  5-PACK

## (undated) DEVICE — PREP SKN CHLRAPRP APL 26ML STR --

## (undated) DEVICE — SET EXTN PRIMING 0.59ML 8.5IN 1.55LB PRSS RATE MINIBOR PUR

## (undated) DEVICE — ADULT SPO2 SENSOR: Brand: NELLCOR

## (undated) DEVICE — POLYLINED TOWEL: Brand: CONVERTORS

## (undated) DEVICE — SYR 10ML LUER LOK 1/5ML GRAD --

## (undated) DEVICE — HYPODERMIC SAFETY NEEDLE: Brand: MONOJECT

## (undated) DEVICE — MARKER,SKIN,WI/RULER AND LABELS: Brand: MEDLINE

## (undated) DEVICE — SYR 5ML 1/5 GRAD LL NSAF LF --

## (undated) DEVICE — GLOVE ORANGE PI 8   MSG9080

## (undated) DEVICE — CVD CANNULA

## (undated) DEVICE — NEEDLE SPNL L4.75IN OD25GA QNCKE TYP SPINOCAN